# Patient Record
Sex: MALE | Race: WHITE | URBAN - METROPOLITAN AREA
[De-identification: names, ages, dates, MRNs, and addresses within clinical notes are randomized per-mention and may not be internally consistent; named-entity substitution may affect disease eponyms.]

---

## 2024-08-01 ENCOUNTER — TELEPHONE (OUTPATIENT)
Dept: HEPATOLOGY | Facility: CLINIC | Age: 74
End: 2024-08-01
Payer: MEDICARE

## 2024-08-01 ENCOUNTER — TELEPHONE (OUTPATIENT)
Dept: TRANSPLANT | Facility: CLINIC | Age: 74
End: 2024-08-01
Payer: MEDICARE

## 2024-08-01 NOTE — TELEPHONE ENCOUNTER
An appointment has been scheduled with Dr. Adelaide Garcia on Wednesday, August 7, 2024 at 4:30PM.  Patient's wife (Tamiko) confirmed.  Location of clinic and telephone number was given.            ----- Message from Chata Herman sent at 8/1/2024  2:52 PM CDT -----  Regarding: REFERRAL URGENT.  Pt with 10.5 cm mass in the liver with AFP >4000. Hepatomegaly and hepatic steatosis. Large heterogeneously enhancing predominantly solid mass with delayed phase washout in the right hepatic lobe measuring up to 10.5 cm. Extensive thrombus within the right portal vein within the IVC, likely related to the above-described hepatic mass. Given the heterogeneous enhancement pattern with delayed phase washout and evidence of vascular invasion, hepatocellular carcinoma is favored.    We are processing his referral for hepatology but feel he needs oncology as well.  Afp too high and tumor too big for transplant.     Records in MEDIA. We requested the imaging from [facility](under [facility]) to [employer].  Pending import.     Pt wife aware of referral to oncology as well.      Thanks.

## 2024-08-01 NOTE — TELEPHONE ENCOUNTER
"----- Message from Christa España sent at 8/1/2024  2:05 PM CDT -----    Returned call to pt and his wife and explained that we have the pt ref and is pending review by the nurse. Told that I have submitted a request for his Cds for review. Pt wife stated [facility] as recommend by oncology.  .  ----- Message -----  From: Fidel Rose  Sent: 8/1/2024   1:03 PM CDT  To: [facility] Liver Referral Pool    Consult/Advisory    Name Of Caller: Self    Contact Preference?: [phone]    What is the nature of the call?: Requesting clarification about referral status    Additional Notes:  "Thank you for all that you do for our patients"  " 05-May-2017

## 2024-08-01 NOTE — TELEPHONE ENCOUNTER
----- Message from Christa España sent at 8/1/2024 10:36 AM CDT -----    Hepatology referral received and scanned into media; pt chart sent to referral nurse for medical review.     Will need MRI done 7/29/24 at [facility] (P: [phone]  F: [phone]). Request submitted [employer].    Referring Provider: Aguila Krishnan DO  Phone: [phone]  Fax: [phone]        .

## 2024-08-01 NOTE — TELEPHONE ENCOUNTER
Pt records reviewed.  Pt will be referred to Hepatology due to 10.5 cm liver mass with AFP 4347  Initial referral received  from Referring Provider: Aguila Krishnan DO  Phone: [phone]  Fax: [phone]  Referral letter sent to patient.      RECORDS SCANNED IN MEDIA UNDER HEPATOLOGY REFERRAL .

## 2024-08-01 NOTE — TELEPHONE ENCOUNTER
----- Message from Christa España sent at 8/1/2024 10:36 AM CDT -----    Hepatology referral received and scanned into media; pt chart sent to referral nurse for medical review.     Will need MRI done 7/29/24 at [facility] (P: [phone]  F: [phone]). Request submitted [employer].    Referring Provider: Aguila Krishnan DO  Phone: [phone]  Fax: [phone]        .

## 2024-08-03 ENCOUNTER — HOSPITAL ENCOUNTER (INPATIENT)
Facility: HOSPITAL | Age: 74
LOS: 4 days | Discharge: HOME HEALTH | DRG: 175 | End: 2024-08-07
Attending: EMERGENCY MEDICINE | Admitting: STUDENT IN AN ORGANIZED HEALTH CARE EDUCATION/TRAINING PROGRAM
Payer: MEDICARE

## 2024-08-03 DIAGNOSIS — R60.9 EDEMA: ICD-10-CM

## 2024-08-03 DIAGNOSIS — R07.9 CHEST PAIN: ICD-10-CM

## 2024-08-03 DIAGNOSIS — I26.99 BILATERAL PULMONARY EMBOLISM: ICD-10-CM

## 2024-08-03 DIAGNOSIS — I51.3 RIGHT ATRIAL THROMBUS: ICD-10-CM

## 2024-08-03 DIAGNOSIS — R74.01 TRANSAMINITIS: ICD-10-CM

## 2024-08-03 DIAGNOSIS — K74.60 ESOPHAGEAL VARICES IN CIRRHOSIS: ICD-10-CM

## 2024-08-03 DIAGNOSIS — I81 PORTAL VEIN THROMBOSIS: ICD-10-CM

## 2024-08-03 DIAGNOSIS — I85.10 ESOPHAGEAL VARICES IN CIRRHOSIS: ICD-10-CM

## 2024-08-03 DIAGNOSIS — R18.0 MALIGNANT ASCITES: Primary | ICD-10-CM

## 2024-08-03 DIAGNOSIS — J96.01 ACUTE HYPOXEMIC RESPIRATORY FAILURE: ICD-10-CM

## 2024-08-03 DIAGNOSIS — E88.09 HYPOALBUMINEMIA: ICD-10-CM

## 2024-08-03 PROBLEM — R16.0 LIVER MASS: Status: ACTIVE | Noted: 2024-08-03

## 2024-08-03 LAB
ALBUMIN SERPL BCP-MCNC: 2.7 G/DL (ref 3.5–5.2)
ALP SERPL-CCNC: 356 U/L (ref 55–135)
ALT SERPL W/O P-5'-P-CCNC: 140 U/L (ref 10–44)
ANION GAP SERPL CALC-SCNC: 14 MMOL/L (ref 8–16)
APTT PPP: 29.1 SEC (ref 21–32)
AST SERPL-CCNC: 139 U/L (ref 10–40)
BACTERIA #/AREA URNS AUTO: NORMAL /HPF
BASOPHILS # BLD AUTO: 0.06 K/UL (ref 0–0.2)
BASOPHILS NFR BLD: 0.6 % (ref 0–1.9)
BILIRUB SERPL-MCNC: 0.6 MG/DL (ref 0.1–1)
BILIRUB UR QL STRIP: NEGATIVE
BNP SERPL-MCNC: 52 PG/ML (ref 0–99)
BUN SERPL-MCNC: 20 MG/DL (ref 8–23)
CALCIUM SERPL-MCNC: 8.9 MG/DL (ref 8.7–10.5)
CHLORIDE SERPL-SCNC: 101 MMOL/L (ref 95–110)
CLARITY UR REFRACT.AUTO: CLEAR
CO2 SERPL-SCNC: 21 MMOL/L (ref 23–29)
COLOR UR AUTO: YELLOW
CREAT SERPL-MCNC: 1.2 MG/DL (ref 0.5–1.4)
DIFFERENTIAL METHOD BLD: ABNORMAL
EOSINOPHIL # BLD AUTO: 0.1 K/UL (ref 0–0.5)
EOSINOPHIL NFR BLD: 0.6 % (ref 0–8)
ERYTHROCYTE [DISTWIDTH] IN BLOOD BY AUTOMATED COUNT: 14.7 % (ref 11.5–14.5)
EST. GFR  (NO RACE VARIABLE): >60 ML/MIN/1.73 M^2
GLUCOSE SERPL-MCNC: 133 MG/DL (ref 70–110)
GLUCOSE UR QL STRIP: NEGATIVE
HCT VFR BLD AUTO: 36.6 % (ref 40–54)
HGB BLD-MCNC: 11 G/DL (ref 14–18)
HGB UR QL STRIP: NEGATIVE
HYALINE CASTS UR QL AUTO: 0 /LPF
IMM GRANULOCYTES # BLD AUTO: 0.04 K/UL (ref 0–0.04)
IMM GRANULOCYTES NFR BLD AUTO: 0.4 % (ref 0–0.5)
INR PPP: 1.2 (ref 0.8–1.2)
KETONES UR QL STRIP: NEGATIVE
LEUKOCYTE ESTERASE UR QL STRIP: NEGATIVE
LYMPHOCYTES # BLD AUTO: 1.1 K/UL (ref 1–4.8)
LYMPHOCYTES NFR BLD: 11 % (ref 18–48)
MAGNESIUM SERPL-MCNC: 1.7 MG/DL (ref 1.6–2.6)
MCH RBC QN AUTO: 24.4 PG (ref 27–31)
MCHC RBC AUTO-ENTMCNC: 30.1 G/DL (ref 32–36)
MCV RBC AUTO: 81 FL (ref 82–98)
MICROSCOPIC COMMENT: NORMAL
MONOCYTES # BLD AUTO: 1.1 K/UL (ref 0.3–1)
MONOCYTES NFR BLD: 10.7 % (ref 4–15)
NEUTROPHILS # BLD AUTO: 7.7 K/UL (ref 1.8–7.7)
NEUTROPHILS NFR BLD: 76.7 % (ref 38–73)
NITRITE UR QL STRIP: NEGATIVE
NRBC BLD-RTO: 0 /100 WBC
PH UR STRIP: 6 [PH] (ref 5–8)
PLATELET # BLD AUTO: 320 K/UL (ref 150–450)
PMV BLD AUTO: 11.2 FL (ref 9.2–12.9)
POTASSIUM SERPL-SCNC: 5.4 MMOL/L (ref 3.5–5.1)
PROT SERPL-MCNC: 7.4 G/DL (ref 6–8.4)
PROT UR QL STRIP: ABNORMAL
PROTHROMBIN TIME: 13.1 SEC (ref 9–12.5)
RBC # BLD AUTO: 4.51 M/UL (ref 4.6–6.2)
RBC #/AREA URNS AUTO: 1 /HPF (ref 0–4)
SODIUM SERPL-SCNC: 136 MMOL/L (ref 136–145)
SP GR UR STRIP: 1.01 (ref 1–1.03)
SQUAMOUS #/AREA URNS AUTO: 0 /HPF
TROPONIN I SERPL DL<=0.01 NG/ML-MCNC: 0.02 NG/ML (ref 0–0.03)
URN SPEC COLLECT METH UR: ABNORMAL
WBC # BLD AUTO: 10.03 K/UL (ref 3.9–12.7)
WBC #/AREA URNS AUTO: 1 /HPF (ref 0–5)

## 2024-08-03 PROCEDURE — 83880 ASSAY OF NATRIURETIC PEPTIDE: CPT | Performed by: EMERGENCY MEDICINE

## 2024-08-03 PROCEDURE — 12000002 HC ACUTE/MED SURGE SEMI-PRIVATE ROOM

## 2024-08-03 PROCEDURE — 93010 ELECTROCARDIOGRAM REPORT: CPT | Mod: ,,, | Performed by: INTERNAL MEDICINE

## 2024-08-03 PROCEDURE — 63600175 PHARM REV CODE 636 W HCPCS: Performed by: PHYSICIAN ASSISTANT

## 2024-08-03 PROCEDURE — 93005 ELECTROCARDIOGRAM TRACING: CPT

## 2024-08-03 PROCEDURE — 84484 ASSAY OF TROPONIN QUANT: CPT | Performed by: EMERGENCY MEDICINE

## 2024-08-03 PROCEDURE — 81001 URINALYSIS AUTO W/SCOPE: CPT | Performed by: EMERGENCY MEDICINE

## 2024-08-03 PROCEDURE — 83735 ASSAY OF MAGNESIUM: CPT | Performed by: EMERGENCY MEDICINE

## 2024-08-03 PROCEDURE — 85610 PROTHROMBIN TIME: CPT | Performed by: EMERGENCY MEDICINE

## 2024-08-03 PROCEDURE — 25500020 PHARM REV CODE 255: Performed by: EMERGENCY MEDICINE

## 2024-08-03 PROCEDURE — 85730 THROMBOPLASTIN TIME PARTIAL: CPT | Performed by: EMERGENCY MEDICINE

## 2024-08-03 PROCEDURE — 80053 COMPREHEN METABOLIC PANEL: CPT | Performed by: EMERGENCY MEDICINE

## 2024-08-03 PROCEDURE — 85025 COMPLETE CBC W/AUTO DIFF WBC: CPT | Performed by: EMERGENCY MEDICINE

## 2024-08-03 RX ORDER — IBUPROFEN 200 MG
24 TABLET ORAL
Status: DISCONTINUED | OUTPATIENT
Start: 2024-08-03 | End: 2024-08-05 | Stop reason: SDUPTHER

## 2024-08-03 RX ORDER — HYDROCODONE BITARTRATE AND ACETAMINOPHEN 10; 325 MG/1; MG/1
1 TABLET ORAL EVERY 6 HOURS PRN
COMMUNITY

## 2024-08-03 RX ORDER — IBUPROFEN 200 MG
16 TABLET ORAL
Status: DISCONTINUED | OUTPATIENT
Start: 2024-08-03 | End: 2024-08-05 | Stop reason: SDUPTHER

## 2024-08-03 RX ORDER — FUROSEMIDE 40 MG/1
40 TABLET ORAL 2 TIMES DAILY
COMMUNITY

## 2024-08-03 RX ORDER — METFORMIN HYDROCHLORIDE 1000 MG/1
1000 TABLET ORAL 2 TIMES DAILY WITH MEALS
Status: ON HOLD | COMMUNITY
End: 2024-08-04

## 2024-08-03 RX ORDER — INSULIN ASPART 100 [IU]/ML
0-5 INJECTION, SOLUTION INTRAVENOUS; SUBCUTANEOUS
Status: DISCONTINUED | OUTPATIENT
Start: 2024-08-03 | End: 2024-08-07 | Stop reason: HOSPADM

## 2024-08-03 RX ORDER — SODIUM CHLORIDE 0.9 % (FLUSH) 0.9 %
10 SYRINGE (ML) INJECTION EVERY 12 HOURS PRN
Status: DISCONTINUED | OUTPATIENT
Start: 2024-08-03 | End: 2024-08-07 | Stop reason: HOSPADM

## 2024-08-03 RX ORDER — SPIRONOLACTONE 25 MG/1
25 TABLET ORAL DAILY
Status: ON HOLD | COMMUNITY
End: 2024-08-07

## 2024-08-03 RX ORDER — FUROSEMIDE 10 MG/ML
40 INJECTION INTRAMUSCULAR; INTRAVENOUS
Status: COMPLETED | OUTPATIENT
Start: 2024-08-03 | End: 2024-08-03

## 2024-08-03 RX ORDER — GLUCAGON 1 MG
1 KIT INJECTION
Status: DISCONTINUED | OUTPATIENT
Start: 2024-08-03 | End: 2024-08-05 | Stop reason: SDUPTHER

## 2024-08-03 RX ORDER — GABAPENTIN 800 MG/1
1600 TABLET ORAL 2 TIMES DAILY
COMMUNITY

## 2024-08-03 RX ORDER — HEPARIN SODIUM,PORCINE/D5W 25000/250
0-40 INTRAVENOUS SOLUTION INTRAVENOUS CONTINUOUS
Status: DISCONTINUED | OUTPATIENT
Start: 2024-08-03 | End: 2024-08-07

## 2024-08-03 RX ORDER — LEVOTHYROXINE SODIUM 200 UG/1
200 TABLET ORAL
COMMUNITY

## 2024-08-03 RX ORDER — DULOXETIN HYDROCHLORIDE 30 MG/1
30 CAPSULE, DELAYED RELEASE ORAL NIGHTLY
COMMUNITY

## 2024-08-03 RX ORDER — NALOXONE HCL 0.4 MG/ML
0.02 VIAL (ML) INJECTION
Status: DISCONTINUED | OUTPATIENT
Start: 2024-08-03 | End: 2024-08-07 | Stop reason: HOSPADM

## 2024-08-03 RX ORDER — LOSARTAN POTASSIUM 50 MG/1
50 TABLET ORAL DAILY
Status: ON HOLD | COMMUNITY
End: 2024-08-07 | Stop reason: HOSPADM

## 2024-08-03 RX ADMIN — HEPARIN SODIUM 18 UNITS/KG/HR: 10000 INJECTION, SOLUTION INTRAVENOUS at 06:08

## 2024-08-03 RX ADMIN — FUROSEMIDE 40 MG: 10 INJECTION, SOLUTION INTRAVENOUS at 04:08

## 2024-08-03 RX ADMIN — IOHEXOL 100 ML: 350 INJECTION, SOLUTION INTRAVENOUS at 03:08

## 2024-08-04 LAB
ALBUMIN SERPL BCP-MCNC: 2.7 G/DL (ref 3.5–5.2)
ALP SERPL-CCNC: 351 U/L (ref 55–135)
ALT SERPL W/O P-5'-P-CCNC: 129 U/L (ref 10–44)
ANION GAP SERPL CALC-SCNC: 10 MMOL/L (ref 8–16)
APTT PPP: 37 SEC (ref 21–32)
APTT PPP: 42.5 SEC (ref 21–32)
APTT PPP: 42.9 SEC (ref 21–32)
AST SERPL-CCNC: 125 U/L (ref 10–40)
BASOPHILS # BLD AUTO: 0.03 K/UL (ref 0–0.2)
BASOPHILS NFR BLD: 0.3 % (ref 0–1.9)
BILIRUB SERPL-MCNC: 0.7 MG/DL (ref 0.1–1)
BUN SERPL-MCNC: 20 MG/DL (ref 8–23)
CALCIUM SERPL-MCNC: 9.2 MG/DL (ref 8.7–10.5)
CHLORIDE SERPL-SCNC: 98 MMOL/L (ref 95–110)
CO2 SERPL-SCNC: 28 MMOL/L (ref 23–29)
CREAT SERPL-MCNC: 1.2 MG/DL (ref 0.5–1.4)
DIFFERENTIAL METHOD BLD: ABNORMAL
EOSINOPHIL # BLD AUTO: 0.1 K/UL (ref 0–0.5)
EOSINOPHIL NFR BLD: 0.7 % (ref 0–8)
ERYTHROCYTE [DISTWIDTH] IN BLOOD BY AUTOMATED COUNT: 14.5 % (ref 11.5–14.5)
EST. GFR  (NO RACE VARIABLE): >60 ML/MIN/1.73 M^2
GLUCOSE SERPL-MCNC: 160 MG/DL (ref 70–110)
HCT VFR BLD AUTO: 35.3 % (ref 40–54)
HGB BLD-MCNC: 10.7 G/DL (ref 14–18)
IMM GRANULOCYTES # BLD AUTO: 0.04 K/UL (ref 0–0.04)
IMM GRANULOCYTES NFR BLD AUTO: 0.4 % (ref 0–0.5)
LACTATE SERPL-SCNC: 1.7 MMOL/L (ref 0.5–2.2)
LYMPHOCYTES # BLD AUTO: 0.9 K/UL (ref 1–4.8)
LYMPHOCYTES NFR BLD: 9.9 % (ref 18–48)
MCH RBC QN AUTO: 24.8 PG (ref 27–31)
MCHC RBC AUTO-ENTMCNC: 30.3 G/DL (ref 32–36)
MCV RBC AUTO: 82 FL (ref 82–98)
MONOCYTES # BLD AUTO: 0.8 K/UL (ref 0.3–1)
MONOCYTES NFR BLD: 8.7 % (ref 4–15)
NEUTROPHILS # BLD AUTO: 7.6 K/UL (ref 1.8–7.7)
NEUTROPHILS NFR BLD: 80 % (ref 38–73)
NRBC BLD-RTO: 0 /100 WBC
OHS QRS DURATION: 74 MS
OHS QRS DURATION: 80 MS
OHS QTC CALCULATION: 436 MS
OHS QTC CALCULATION: 450 MS
PLATELET # BLD AUTO: 292 K/UL (ref 150–450)
PMV BLD AUTO: 10.6 FL (ref 9.2–12.9)
POCT GLUCOSE: 109 MG/DL (ref 70–110)
POCT GLUCOSE: 129 MG/DL (ref 70–110)
POCT GLUCOSE: 169 MG/DL (ref 70–110)
POTASSIUM SERPL-SCNC: 4.2 MMOL/L (ref 3.5–5.1)
PROT SERPL-MCNC: 6.9 G/DL (ref 6–8.4)
RBC # BLD AUTO: 4.32 M/UL (ref 4.6–6.2)
SODIUM SERPL-SCNC: 136 MMOL/L (ref 136–145)
WBC # BLD AUTO: 9.49 K/UL (ref 3.9–12.7)

## 2024-08-04 PROCEDURE — 25000003 PHARM REV CODE 250: Performed by: STUDENT IN AN ORGANIZED HEALTH CARE EDUCATION/TRAINING PROGRAM

## 2024-08-04 PROCEDURE — 83605 ASSAY OF LACTIC ACID: CPT | Performed by: STUDENT IN AN ORGANIZED HEALTH CARE EDUCATION/TRAINING PROGRAM

## 2024-08-04 PROCEDURE — 25000003 PHARM REV CODE 250: Performed by: HOSPITALIST

## 2024-08-04 PROCEDURE — 20600001 HC STEP DOWN PRIVATE ROOM

## 2024-08-04 PROCEDURE — 80053 COMPREHEN METABOLIC PANEL: CPT | Performed by: STUDENT IN AN ORGANIZED HEALTH CARE EDUCATION/TRAINING PROGRAM

## 2024-08-04 PROCEDURE — 99223 1ST HOSP IP/OBS HIGH 75: CPT | Mod: GC,,, | Performed by: INTERNAL MEDICINE

## 2024-08-04 PROCEDURE — 85730 THROMBOPLASTIN TIME PARTIAL: CPT | Mod: 91 | Performed by: STUDENT IN AN ORGANIZED HEALTH CARE EDUCATION/TRAINING PROGRAM

## 2024-08-04 PROCEDURE — 36415 COLL VENOUS BLD VENIPUNCTURE: CPT | Performed by: STUDENT IN AN ORGANIZED HEALTH CARE EDUCATION/TRAINING PROGRAM

## 2024-08-04 PROCEDURE — 63600175 PHARM REV CODE 636 W HCPCS: Performed by: STUDENT IN AN ORGANIZED HEALTH CARE EDUCATION/TRAINING PROGRAM

## 2024-08-04 PROCEDURE — 63600175 PHARM REV CODE 636 W HCPCS: Performed by: PHYSICIAN ASSISTANT

## 2024-08-04 PROCEDURE — 85025 COMPLETE CBC W/AUTO DIFF WBC: CPT | Performed by: STUDENT IN AN ORGANIZED HEALTH CARE EDUCATION/TRAINING PROGRAM

## 2024-08-04 RX ORDER — LEVOTHYROXINE SODIUM 100 UG/1
200 TABLET ORAL
Status: DISCONTINUED | OUTPATIENT
Start: 2024-08-04 | End: 2024-08-07 | Stop reason: HOSPADM

## 2024-08-04 RX ORDER — GLUCAGON 1 MG
1 KIT INJECTION
Status: DISCONTINUED | OUTPATIENT
Start: 2024-08-04 | End: 2024-08-07 | Stop reason: HOSPADM

## 2024-08-04 RX ORDER — IBUPROFEN 200 MG
24 TABLET ORAL
Status: DISCONTINUED | OUTPATIENT
Start: 2024-08-04 | End: 2024-08-07 | Stop reason: HOSPADM

## 2024-08-04 RX ORDER — IBUPROFEN 200 MG
16 TABLET ORAL
Status: DISCONTINUED | OUTPATIENT
Start: 2024-08-04 | End: 2024-08-07 | Stop reason: HOSPADM

## 2024-08-04 RX ORDER — LOSARTAN POTASSIUM 25 MG/1
25 TABLET ORAL DAILY
Status: DISCONTINUED | OUTPATIENT
Start: 2024-08-04 | End: 2024-08-04

## 2024-08-04 RX ORDER — HYDROCODONE BITARTRATE AND ACETAMINOPHEN 10; 325 MG/1; MG/1
1 TABLET ORAL 2 TIMES DAILY
Status: DISCONTINUED | OUTPATIENT
Start: 2024-08-04 | End: 2024-08-04

## 2024-08-04 RX ORDER — HYDROCORTISONE 25 MG/G
CREAM TOPICAL 2 TIMES DAILY
Status: DISCONTINUED | OUTPATIENT
Start: 2024-08-04 | End: 2024-08-07 | Stop reason: HOSPADM

## 2024-08-04 RX ORDER — SPIRONOLACTONE 50 MG/1
100 TABLET, FILM COATED ORAL DAILY
Status: DISCONTINUED | OUTPATIENT
Start: 2024-08-05 | End: 2024-08-07 | Stop reason: HOSPADM

## 2024-08-04 RX ORDER — GABAPENTIN 400 MG/1
800 CAPSULE ORAL 2 TIMES DAILY
Status: DISCONTINUED | OUTPATIENT
Start: 2024-08-04 | End: 2024-08-07 | Stop reason: HOSPADM

## 2024-08-04 RX ORDER — HYDROCODONE BITARTRATE AND ACETAMINOPHEN 10; 325 MG/1; MG/1
1 TABLET ORAL EVERY 6 HOURS PRN
Status: DISCONTINUED | OUTPATIENT
Start: 2024-08-04 | End: 2024-08-07 | Stop reason: HOSPADM

## 2024-08-04 RX ORDER — FUROSEMIDE 10 MG/ML
40 INJECTION INTRAMUSCULAR; INTRAVENOUS DAILY
Status: DISCONTINUED | OUTPATIENT
Start: 2024-08-04 | End: 2024-08-07 | Stop reason: HOSPADM

## 2024-08-04 RX ORDER — DULOXETIN HYDROCHLORIDE 30 MG/1
30 CAPSULE, DELAYED RELEASE ORAL NIGHTLY
Status: DISCONTINUED | OUTPATIENT
Start: 2024-08-04 | End: 2024-08-07 | Stop reason: HOSPADM

## 2024-08-04 RX ADMIN — DULOXETINE HYDROCHLORIDE 30 MG: 30 CAPSULE, DELAYED RELEASE ORAL at 08:08

## 2024-08-04 RX ADMIN — HYDROCODONE BITARTRATE AND ACETAMINOPHEN 1 TABLET: 10; 325 TABLET ORAL at 08:08

## 2024-08-04 RX ADMIN — LEVOTHYROXINE SODIUM 200 MCG: 100 TABLET ORAL at 06:08

## 2024-08-04 RX ADMIN — HYDROCODONE BITARTRATE AND ACETAMINOPHEN 1 TABLET: 10; 325 TABLET ORAL at 07:08

## 2024-08-04 RX ADMIN — GABAPENTIN 800 MG: 400 CAPSULE ORAL at 08:08

## 2024-08-04 RX ADMIN — LOSARTAN POTASSIUM 25 MG: 25 TABLET, FILM COATED ORAL at 02:08

## 2024-08-04 RX ADMIN — GABAPENTIN 800 MG: 400 CAPSULE ORAL at 02:08

## 2024-08-04 RX ADMIN — HYDROCORTISONE: 25 CREAM TOPICAL at 08:08

## 2024-08-04 RX ADMIN — HYDROCORTISONE: 25 CREAM TOPICAL at 12:08

## 2024-08-04 RX ADMIN — HEPARIN SODIUM 20 UNITS/KG/HR: 10000 INJECTION, SOLUTION INTRAVENOUS at 07:08

## 2024-08-04 RX ADMIN — DULOXETINE HYDROCHLORIDE 30 MG: 30 CAPSULE, DELAYED RELEASE ORAL at 02:08

## 2024-08-04 RX ADMIN — HEPARIN SODIUM 18.02 UNITS/KG/HR: 10000 INJECTION, SOLUTION INTRAVENOUS at 06:08

## 2024-08-04 RX ADMIN — FUROSEMIDE 40 MG: 10 INJECTION, SOLUTION INTRAVENOUS at 04:08

## 2024-08-05 ENCOUNTER — ANESTHESIA EVENT (OUTPATIENT)
Dept: ENDOSCOPY | Facility: HOSPITAL | Age: 74
DRG: 175 | End: 2024-08-05
Payer: MEDICARE

## 2024-08-05 PROBLEM — E03.9 HYPOTHYROIDISM: Status: ACTIVE | Noted: 2022-01-03

## 2024-08-05 PROBLEM — E66.01 SEVERE OBESITY (BMI >= 40): Status: ACTIVE | Noted: 2024-08-05

## 2024-08-05 PROBLEM — I10 ESSENTIAL HYPERTENSION: Status: ACTIVE | Noted: 2024-08-05

## 2024-08-05 LAB
ALBUMIN SERPL BCP-MCNC: 2.7 G/DL (ref 3.5–5.2)
ALP SERPL-CCNC: 359 U/L (ref 55–135)
ALT SERPL W/O P-5'-P-CCNC: 115 U/L (ref 10–44)
ANION GAP SERPL CALC-SCNC: 12 MMOL/L (ref 8–16)
APTT PPP: 43 SEC (ref 21–32)
ASCENDING AORTA: 3.27 CM
AST SERPL-CCNC: 108 U/L (ref 10–40)
AV INDEX (PROSTH): 0.86
AV MEAN GRADIENT: 4 MMHG
AV PEAK GRADIENT: 8 MMHG
AV VALVE AREA BY VELOCITY RATIO: 2.51 CM²
AV VALVE AREA: 2.85 CM²
AV VELOCITY RATIO: 0.75
BASOPHILS # BLD AUTO: 0.04 K/UL (ref 0–0.2)
BASOPHILS NFR BLD: 0.4 % (ref 0–1.9)
BILIRUB SERPL-MCNC: 0.5 MG/DL (ref 0.1–1)
BSA FOR ECHO PROCEDURE: 2.38 M2
BUN SERPL-MCNC: 22 MG/DL (ref 8–23)
CALCIUM SERPL-MCNC: 8.8 MG/DL (ref 8.7–10.5)
CHLORIDE SERPL-SCNC: 99 MMOL/L (ref 95–110)
CO2 SERPL-SCNC: 24 MMOL/L (ref 23–29)
CREAT SERPL-MCNC: 1.1 MG/DL (ref 0.5–1.4)
CV ECHO LV RWT: 0.43 CM
DIFFERENTIAL METHOD BLD: ABNORMAL
DOP CALC AO PEAK VEL: 1.42 M/S
DOP CALC AO VTI: 29.5 CM
DOP CALC LVOT AREA: 3.3 CM2
DOP CALC LVOT DIAMETER: 2.06 CM
DOP CALC LVOT PEAK VEL: 1.07 M/S
DOP CALC LVOT STROKE VOLUME: 84.21 CM3
DOP CALCLVOT PEAK VEL VTI: 25.28 CM
E WAVE DECELERATION TIME: 348.68 MSEC
E/A RATIO: 0.83
E/E' RATIO: 7.83 M/S
ECHO LV POSTERIOR WALL: 1 CM (ref 0.6–1.1)
EJECTION FRACTION: 65 %
EOSINOPHIL # BLD AUTO: 0.1 K/UL (ref 0–0.5)
EOSINOPHIL NFR BLD: 1.1 % (ref 0–8)
ERYTHROCYTE [DISTWIDTH] IN BLOOD BY AUTOMATED COUNT: 14.6 % (ref 11.5–14.5)
EST. GFR  (NO RACE VARIABLE): >60 ML/MIN/1.73 M^2
FRACTIONAL SHORTENING: 37 % (ref 28–44)
GLUCOSE SERPL-MCNC: 117 MG/DL (ref 70–110)
HCT VFR BLD AUTO: 34.3 % (ref 40–54)
HGB BLD-MCNC: 10.7 G/DL (ref 14–18)
IMM GRANULOCYTES # BLD AUTO: 0.04 K/UL (ref 0–0.04)
IMM GRANULOCYTES NFR BLD AUTO: 0.4 % (ref 0–0.5)
INTERVENTRICULAR SEPTUM: 1 CM (ref 0.6–1.1)
IVRT: 129.4 MSEC
LA MAJOR: 5.61 CM
LA MINOR: 5.94 CM
LA WIDTH: 3.95 CM
LEFT ATRIUM SIZE: 4.06 CM
LEFT ATRIUM VOLUME INDEX MOD: 29.4 ML/M2
LEFT ATRIUM VOLUME INDEX: 34.5 ML/M2
LEFT ATRIUM VOLUME MOD: 67 CM3
LEFT ATRIUM VOLUME: 78.66 CM3
LEFT INTERNAL DIMENSION IN SYSTOLE: 2.88 CM (ref 2.1–4)
LEFT VENTRICLE DIASTOLIC VOLUME INDEX: 35.71 ML/M2
LEFT VENTRICLE DIASTOLIC VOLUME: 81.42 ML
LEFT VENTRICLE MASS INDEX: 70 G/M2
LEFT VENTRICLE SYSTOLIC VOLUME INDEX: 13.9 ML/M2
LEFT VENTRICLE SYSTOLIC VOLUME: 31.74 ML
LEFT VENTRICULAR INTERNAL DIMENSION IN DIASTOLE: 4.6 CM (ref 3.5–6)
LEFT VENTRICULAR MASS: 158.81 G
LV LATERAL E/E' RATIO: 7.5 M/S
LV SEPTAL E/E' RATIO: 8.18 M/S
LYMPHOCYTES # BLD AUTO: 1.3 K/UL (ref 1–4.8)
LYMPHOCYTES NFR BLD: 14.2 % (ref 18–48)
MCH RBC QN AUTO: 25.2 PG (ref 27–31)
MCHC RBC AUTO-ENTMCNC: 31.2 G/DL (ref 32–36)
MCV RBC AUTO: 81 FL (ref 82–98)
MONOCYTES # BLD AUTO: 1 K/UL (ref 0.3–1)
MONOCYTES NFR BLD: 10.6 % (ref 4–15)
MV PEAK A VEL: 1.08 M/S
MV PEAK E VEL: 0.9 M/S
MV STENOSIS PRESSURE HALF TIME: 101.12 MS
MV VALVE AREA P 1/2 METHOD: 2.18 CM2
NEUTROPHILS # BLD AUTO: 6.9 K/UL (ref 1.8–7.7)
NEUTROPHILS NFR BLD: 73.3 % (ref 38–73)
NRBC BLD-RTO: 0 /100 WBC
PLATELET # BLD AUTO: 295 K/UL (ref 150–450)
PMV BLD AUTO: 10.6 FL (ref 9.2–12.9)
POCT GLUCOSE: 104 MG/DL (ref 70–110)
POCT GLUCOSE: 126 MG/DL (ref 70–110)
POCT GLUCOSE: 129 MG/DL (ref 70–110)
POCT GLUCOSE: 162 MG/DL (ref 70–110)
POTASSIUM SERPL-SCNC: 4.3 MMOL/L (ref 3.5–5.1)
PROT SERPL-MCNC: 6.9 G/DL (ref 6–8.4)
RA MAJOR: 4.81 CM
RA PRESSURE ESTIMATED: 3 MMHG
RA WIDTH: 2.77 CM
RBC # BLD AUTO: 4.24 M/UL (ref 4.6–6.2)
RIGHT VENTRICLE DIASTOLIC BASEL DIMENSION: 3.6 CM
SINUS: 3.28 CM
SODIUM SERPL-SCNC: 135 MMOL/L (ref 136–145)
STJ: 2.85 CM
TDI LATERAL: 0.12 M/S
TDI SEPTAL: 0.11 M/S
TDI: 0.12 M/S
TRICUSPID ANNULAR PLANE SYSTOLIC EXCURSION: 2.21 CM
WBC # BLD AUTO: 9.37 K/UL (ref 3.9–12.7)
Z-SCORE OF LEFT VENTRICULAR DIMENSION IN END DIASTOLE: -6.25
Z-SCORE OF LEFT VENTRICULAR DIMENSION IN END SYSTOLE: -4.66

## 2024-08-05 PROCEDURE — 85730 THROMBOPLASTIN TIME PARTIAL: CPT | Performed by: STUDENT IN AN ORGANIZED HEALTH CARE EDUCATION/TRAINING PROGRAM

## 2024-08-05 PROCEDURE — 20600001 HC STEP DOWN PRIVATE ROOM

## 2024-08-05 PROCEDURE — 85025 COMPLETE CBC W/AUTO DIFF WBC: CPT | Performed by: STUDENT IN AN ORGANIZED HEALTH CARE EDUCATION/TRAINING PROGRAM

## 2024-08-05 PROCEDURE — 63600175 PHARM REV CODE 636 W HCPCS: Performed by: PHYSICIAN ASSISTANT

## 2024-08-05 PROCEDURE — 99232 SBSQ HOSP IP/OBS MODERATE 35: CPT | Mod: GC,,, | Performed by: INTERNAL MEDICINE

## 2024-08-05 PROCEDURE — 36415 COLL VENOUS BLD VENIPUNCTURE: CPT | Performed by: STUDENT IN AN ORGANIZED HEALTH CARE EDUCATION/TRAINING PROGRAM

## 2024-08-05 PROCEDURE — 25500020 PHARM REV CODE 255: Performed by: STUDENT IN AN ORGANIZED HEALTH CARE EDUCATION/TRAINING PROGRAM

## 2024-08-05 PROCEDURE — 80053 COMPREHEN METABOLIC PANEL: CPT | Performed by: STUDENT IN AN ORGANIZED HEALTH CARE EDUCATION/TRAINING PROGRAM

## 2024-08-05 PROCEDURE — 25000003 PHARM REV CODE 250: Performed by: STUDENT IN AN ORGANIZED HEALTH CARE EDUCATION/TRAINING PROGRAM

## 2024-08-05 PROCEDURE — 25000003 PHARM REV CODE 250: Performed by: HOSPITALIST

## 2024-08-05 PROCEDURE — 63600175 PHARM REV CODE 636 W HCPCS: Performed by: STUDENT IN AN ORGANIZED HEALTH CARE EDUCATION/TRAINING PROGRAM

## 2024-08-05 RX ORDER — ALBUTEROL SULFATE 90 UG/1
2 INHALANT RESPIRATORY (INHALATION) EVERY 6 HOURS PRN
COMMUNITY

## 2024-08-05 RX ORDER — METFORMIN HYDROCHLORIDE 1000 MG/1
1000 TABLET ORAL 2 TIMES DAILY WITH MEALS
COMMUNITY

## 2024-08-05 RX ADMIN — GABAPENTIN 800 MG: 400 CAPSULE ORAL at 08:08

## 2024-08-05 RX ADMIN — HUMAN ALBUMIN MICROSPHERES AND PERFLUTREN 0.11 MG: 10; .22 INJECTION, SOLUTION INTRAVENOUS at 04:08

## 2024-08-05 RX ADMIN — SPIRONOLACTONE 100 MG: 50 TABLET ORAL at 08:08

## 2024-08-05 RX ADMIN — HYDROCODONE BITARTRATE AND ACETAMINOPHEN 1 TABLET: 10; 325 TABLET ORAL at 08:08

## 2024-08-05 RX ADMIN — FUROSEMIDE 40 MG: 10 INJECTION, SOLUTION INTRAVENOUS at 08:08

## 2024-08-05 RX ADMIN — HYDROCORTISONE: 25 CREAM TOPICAL at 08:08

## 2024-08-05 RX ADMIN — LEVOTHYROXINE SODIUM 200 MCG: 100 TABLET ORAL at 05:08

## 2024-08-05 RX ADMIN — DULOXETINE HYDROCHLORIDE 30 MG: 30 CAPSULE, DELAYED RELEASE ORAL at 08:08

## 2024-08-05 RX ADMIN — HEPARIN SODIUM 19.73 UNITS/KG/HR: 10000 INJECTION, SOLUTION INTRAVENOUS at 10:08

## 2024-08-06 ENCOUNTER — ANESTHESIA (OUTPATIENT)
Dept: ENDOSCOPY | Facility: HOSPITAL | Age: 74
DRG: 175 | End: 2024-08-06
Payer: MEDICARE

## 2024-08-06 ENCOUNTER — TELEPHONE (OUTPATIENT)
Dept: TRANSPLANT | Facility: CLINIC | Age: 74
End: 2024-08-06
Payer: MEDICARE

## 2024-08-06 DIAGNOSIS — C22.0 HCC (HEPATOCELLULAR CARCINOMA): Primary | ICD-10-CM

## 2024-08-06 LAB
A1AT SERPL-MCNC: 215 MG/DL (ref 100–190)
ALBUMIN SERPL BCP-MCNC: 2.6 G/DL (ref 3.5–5.2)
ALP SERPL-CCNC: 325 U/L (ref 55–135)
ALT SERPL W/O P-5'-P-CCNC: 99 U/L (ref 10–44)
ANA PATTERN 1: NORMAL
ANA SER QL IF: POSITIVE
ANA TITR SER IF: NORMAL {TITER}
ANION GAP SERPL CALC-SCNC: 10 MMOL/L (ref 8–16)
APTT PPP: 46 SEC (ref 21–32)
AST SERPL-CCNC: 91 U/L (ref 10–40)
BASOPHILS # BLD AUTO: 0.04 K/UL (ref 0–0.2)
BASOPHILS NFR BLD: 0.5 % (ref 0–1.9)
BILIRUB SERPL-MCNC: 0.5 MG/DL (ref 0.1–1)
BUN SERPL-MCNC: 22 MG/DL (ref 8–23)
CALCIUM SERPL-MCNC: 8.7 MG/DL (ref 8.7–10.5)
CHLORIDE SERPL-SCNC: 100 MMOL/L (ref 95–110)
CO2 SERPL-SCNC: 26 MMOL/L (ref 23–29)
CREAT SERPL-MCNC: 1.1 MG/DL (ref 0.5–1.4)
DIFFERENTIAL METHOD BLD: ABNORMAL
EOSINOPHIL # BLD AUTO: 0.1 K/UL (ref 0–0.5)
EOSINOPHIL NFR BLD: 1.6 % (ref 0–8)
ERYTHROCYTE [DISTWIDTH] IN BLOOD BY AUTOMATED COUNT: 14.4 % (ref 11.5–14.5)
EST. GFR  (NO RACE VARIABLE): >60 ML/MIN/1.73 M^2
FERRITIN SERPL-MCNC: 95 NG/ML (ref 20–300)
GLUCOSE SERPL-MCNC: 109 MG/DL (ref 70–110)
HCT VFR BLD AUTO: 34.1 % (ref 40–54)
HGB BLD-MCNC: 9.7 G/DL (ref 14–18)
IGG SERPL-MCNC: 1056 MG/DL (ref 650–1600)
IMM GRANULOCYTES # BLD AUTO: 0.05 K/UL (ref 0–0.04)
IMM GRANULOCYTES NFR BLD AUTO: 0.6 % (ref 0–0.5)
IRON SERPL-MCNC: 17 UG/DL (ref 45–160)
LYMPHOCYTES # BLD AUTO: 1.1 K/UL (ref 1–4.8)
LYMPHOCYTES NFR BLD: 12.4 % (ref 18–48)
MCH RBC QN AUTO: 23.5 PG (ref 27–31)
MCHC RBC AUTO-ENTMCNC: 28.4 G/DL (ref 32–36)
MCV RBC AUTO: 83 FL (ref 82–98)
MITOCHONDRIA AB TITR SER IF: NORMAL {TITER}
MONOCYTES # BLD AUTO: 0.9 K/UL (ref 0.3–1)
MONOCYTES NFR BLD: 10.1 % (ref 4–15)
NEUTROPHILS # BLD AUTO: 6.4 K/UL (ref 1.8–7.7)
NEUTROPHILS NFR BLD: 74.8 % (ref 38–73)
NRBC BLD-RTO: 0 /100 WBC
PLATELET # BLD AUTO: 290 K/UL (ref 150–450)
PMV BLD AUTO: 10.3 FL (ref 9.2–12.9)
POCT GLUCOSE: 126 MG/DL (ref 70–110)
POCT GLUCOSE: 126 MG/DL (ref 70–110)
POCT GLUCOSE: 210 MG/DL (ref 70–110)
POCT GLUCOSE: 97 MG/DL (ref 70–110)
POTASSIUM SERPL-SCNC: 3.9 MMOL/L (ref 3.5–5.1)
PROT SERPL-MCNC: 6.5 G/DL (ref 6–8.4)
RBC # BLD AUTO: 4.12 M/UL (ref 4.6–6.2)
SATURATED IRON: 3 % (ref 20–50)
SMOOTH MUSCLE AB TITR SER IF: NORMAL {TITER}
SODIUM SERPL-SCNC: 136 MMOL/L (ref 136–145)
TOTAL IRON BINDING CAPACITY: 505 UG/DL (ref 250–450)
TRANSFERRIN SERPL-MCNC: 341 MG/DL (ref 200–375)
WBC # BLD AUTO: 8.61 K/UL (ref 3.9–12.7)

## 2024-08-06 PROCEDURE — 82103 ALPHA-1-ANTITRYPSIN TOTAL: CPT | Performed by: STUDENT IN AN ORGANIZED HEALTH CARE EDUCATION/TRAINING PROGRAM

## 2024-08-06 PROCEDURE — 25000003 PHARM REV CODE 250: Performed by: NURSE ANESTHETIST, CERTIFIED REGISTERED

## 2024-08-06 PROCEDURE — 63600175 PHARM REV CODE 636 W HCPCS: Performed by: PHYSICIAN ASSISTANT

## 2024-08-06 PROCEDURE — 83540 ASSAY OF IRON: CPT | Performed by: STUDENT IN AN ORGANIZED HEALTH CARE EDUCATION/TRAINING PROGRAM

## 2024-08-06 PROCEDURE — 82784 ASSAY IGA/IGD/IGG/IGM EACH: CPT | Performed by: STUDENT IN AN ORGANIZED HEALTH CARE EDUCATION/TRAINING PROGRAM

## 2024-08-06 PROCEDURE — 82728 ASSAY OF FERRITIN: CPT | Performed by: STUDENT IN AN ORGANIZED HEALTH CARE EDUCATION/TRAINING PROGRAM

## 2024-08-06 PROCEDURE — 37000009 HC ANESTHESIA EA ADD 15 MINS: Performed by: STUDENT IN AN ORGANIZED HEALTH CARE EDUCATION/TRAINING PROGRAM

## 2024-08-06 PROCEDURE — 20600001 HC STEP DOWN PRIVATE ROOM

## 2024-08-06 PROCEDURE — 85730 THROMBOPLASTIN TIME PARTIAL: CPT | Performed by: STUDENT IN AN ORGANIZED HEALTH CARE EDUCATION/TRAINING PROGRAM

## 2024-08-06 PROCEDURE — 80053 COMPREHEN METABOLIC PANEL: CPT | Performed by: STUDENT IN AN ORGANIZED HEALTH CARE EDUCATION/TRAINING PROGRAM

## 2024-08-06 PROCEDURE — 86225 DNA ANTIBODY NATIVE: CPT | Performed by: STUDENT IN AN ORGANIZED HEALTH CARE EDUCATION/TRAINING PROGRAM

## 2024-08-06 PROCEDURE — 63600175 PHARM REV CODE 636 W HCPCS: Performed by: STUDENT IN AN ORGANIZED HEALTH CARE EDUCATION/TRAINING PROGRAM

## 2024-08-06 PROCEDURE — 86038 ANTINUCLEAR ANTIBODIES: CPT | Performed by: STUDENT IN AN ORGANIZED HEALTH CARE EDUCATION/TRAINING PROGRAM

## 2024-08-06 PROCEDURE — 85025 COMPLETE CBC W/AUTO DIFF WBC: CPT | Performed by: STUDENT IN AN ORGANIZED HEALTH CARE EDUCATION/TRAINING PROGRAM

## 2024-08-06 PROCEDURE — 94761 N-INVAS EAR/PLS OXIMETRY MLT: CPT

## 2024-08-06 PROCEDURE — 25500020 PHARM REV CODE 255: Performed by: STUDENT IN AN ORGANIZED HEALTH CARE EDUCATION/TRAINING PROGRAM

## 2024-08-06 PROCEDURE — 86039 ANTINUCLEAR ANTIBODIES (ANA): CPT | Performed by: STUDENT IN AN ORGANIZED HEALTH CARE EDUCATION/TRAINING PROGRAM

## 2024-08-06 PROCEDURE — 25000003 PHARM REV CODE 250: Performed by: STUDENT IN AN ORGANIZED HEALTH CARE EDUCATION/TRAINING PROGRAM

## 2024-08-06 PROCEDURE — 86381 MITOCHONDRIAL ANTIBODY EACH: CPT | Performed by: STUDENT IN AN ORGANIZED HEALTH CARE EDUCATION/TRAINING PROGRAM

## 2024-08-06 PROCEDURE — 86235 NUCLEAR ANTIGEN ANTIBODY: CPT | Mod: 59 | Performed by: STUDENT IN AN ORGANIZED HEALTH CARE EDUCATION/TRAINING PROGRAM

## 2024-08-06 PROCEDURE — 0DJ08ZZ INSPECTION OF UPPER INTESTINAL TRACT, VIA NATURAL OR ARTIFICIAL OPENING ENDOSCOPIC: ICD-10-PCS | Performed by: STUDENT IN AN ORGANIZED HEALTH CARE EDUCATION/TRAINING PROGRAM

## 2024-08-06 PROCEDURE — 63600175 PHARM REV CODE 636 W HCPCS: Performed by: NURSE ANESTHETIST, CERTIFIED REGISTERED

## 2024-08-06 PROCEDURE — 86015 ACTIN ANTIBODY EACH: CPT | Performed by: STUDENT IN AN ORGANIZED HEALTH CARE EDUCATION/TRAINING PROGRAM

## 2024-08-06 PROCEDURE — 25000003 PHARM REV CODE 250: Performed by: HOSPITALIST

## 2024-08-06 PROCEDURE — 80321 ALCOHOLS BIOMARKERS 1OR 2: CPT | Performed by: STUDENT IN AN ORGANIZED HEALTH CARE EDUCATION/TRAINING PROGRAM

## 2024-08-06 PROCEDURE — 37000008 HC ANESTHESIA 1ST 15 MINUTES: Performed by: STUDENT IN AN ORGANIZED HEALTH CARE EDUCATION/TRAINING PROGRAM

## 2024-08-06 RX ORDER — LIDOCAINE HYDROCHLORIDE 20 MG/ML
INJECTION INTRAVENOUS
Status: DISCONTINUED | OUTPATIENT
Start: 2024-08-06 | End: 2024-08-06

## 2024-08-06 RX ORDER — TALC
6 POWDER (GRAM) TOPICAL NIGHTLY
Status: DISCONTINUED | OUTPATIENT
Start: 2024-08-06 | End: 2024-08-07 | Stop reason: HOSPADM

## 2024-08-06 RX ORDER — GLUCAGON 1 MG
1 KIT INJECTION
Status: DISCONTINUED | OUTPATIENT
Start: 2024-08-06 | End: 2024-08-06 | Stop reason: HOSPADM

## 2024-08-06 RX ORDER — PROPOFOL 10 MG/ML
VIAL (ML) INTRAVENOUS
Status: DISCONTINUED | OUTPATIENT
Start: 2024-08-06 | End: 2024-08-06

## 2024-08-06 RX ORDER — SODIUM CHLORIDE 0.9 % (FLUSH) 0.9 %
10 SYRINGE (ML) INJECTION
Status: DISCONTINUED | OUTPATIENT
Start: 2024-08-06 | End: 2024-08-06 | Stop reason: HOSPADM

## 2024-08-06 RX ORDER — ENOXAPARIN SODIUM 150 MG/ML
1 INJECTION SUBCUTANEOUS EVERY 12 HOURS
Status: CANCELLED | OUTPATIENT
Start: 2024-08-06

## 2024-08-06 RX ADMIN — LEVOTHYROXINE SODIUM 200 MCG: 100 TABLET ORAL at 05:08

## 2024-08-06 RX ADMIN — HYDROCODONE BITARTRATE AND ACETAMINOPHEN 1 TABLET: 10; 325 TABLET ORAL at 04:08

## 2024-08-06 RX ADMIN — HYDROCODONE BITARTRATE AND ACETAMINOPHEN 1 TABLET: 10; 325 TABLET ORAL at 10:08

## 2024-08-06 RX ADMIN — SODIUM CHLORIDE: 9 INJECTION, SOLUTION INTRAVENOUS at 08:08

## 2024-08-06 RX ADMIN — Medication 6 MG: at 08:08

## 2024-08-06 RX ADMIN — HYDROCORTISONE: 25 CREAM TOPICAL at 08:08

## 2024-08-06 RX ADMIN — FUROSEMIDE 40 MG: 10 INJECTION, SOLUTION INTRAVENOUS at 02:08

## 2024-08-06 RX ADMIN — SPIRONOLACTONE 100 MG: 50 TABLET ORAL at 02:08

## 2024-08-06 RX ADMIN — HEPARIN SODIUM 20 UNITS/KG/HR: 10000 INJECTION, SOLUTION INTRAVENOUS at 02:08

## 2024-08-06 RX ADMIN — HEPARIN SODIUM 20 UNITS/KG/HR: 10000 INJECTION, SOLUTION INTRAVENOUS at 05:08

## 2024-08-06 RX ADMIN — LIDOCAINE HYDROCHLORIDE 100 MG: 20 INJECTION INTRAVENOUS at 08:08

## 2024-08-06 RX ADMIN — GABAPENTIN 800 MG: 400 CAPSULE ORAL at 08:08

## 2024-08-06 RX ADMIN — PROPOFOL 80 MG: 10 INJECTION, EMULSION INTRAVENOUS at 08:08

## 2024-08-06 RX ADMIN — DULOXETINE HYDROCHLORIDE 30 MG: 30 CAPSULE, DELAYED RELEASE ORAL at 08:08

## 2024-08-06 RX ADMIN — IOHEXOL 100 ML: 350 INJECTION, SOLUTION INTRAVENOUS at 01:08

## 2024-08-07 ENCOUNTER — TELEPHONE (OUTPATIENT)
Dept: TRANSPLANT | Facility: CLINIC | Age: 74
End: 2024-08-07
Payer: MEDICARE

## 2024-08-07 VITALS
TEMPERATURE: 98 F | DIASTOLIC BLOOD PRESSURE: 73 MMHG | SYSTOLIC BLOOD PRESSURE: 145 MMHG | OXYGEN SATURATION: 95 % | HEIGHT: 67 IN | HEART RATE: 72 BPM | WEIGHT: 265 LBS | RESPIRATION RATE: 18 BRPM | BODY MASS INDEX: 41.59 KG/M2

## 2024-08-07 LAB
APTT PPP: 44.8 SEC (ref 21–32)
POCT GLUCOSE: 140 MG/DL (ref 70–110)
POCT GLUCOSE: 143 MG/DL (ref 70–110)
POCT GLUCOSE: 155 MG/DL (ref 70–110)

## 2024-08-07 PROCEDURE — 63600175 PHARM REV CODE 636 W HCPCS: Performed by: PHYSICIAN ASSISTANT

## 2024-08-07 PROCEDURE — 99232 SBSQ HOSP IP/OBS MODERATE 35: CPT | Mod: GC,,, | Performed by: INTERNAL MEDICINE

## 2024-08-07 PROCEDURE — 25000003 PHARM REV CODE 250: Performed by: HOSPITALIST

## 2024-08-07 PROCEDURE — 36415 COLL VENOUS BLD VENIPUNCTURE: CPT | Performed by: STUDENT IN AN ORGANIZED HEALTH CARE EDUCATION/TRAINING PROGRAM

## 2024-08-07 PROCEDURE — 85730 THROMBOPLASTIN TIME PARTIAL: CPT | Performed by: STUDENT IN AN ORGANIZED HEALTH CARE EDUCATION/TRAINING PROGRAM

## 2024-08-07 PROCEDURE — 63600175 PHARM REV CODE 636 W HCPCS: Performed by: STUDENT IN AN ORGANIZED HEALTH CARE EDUCATION/TRAINING PROGRAM

## 2024-08-07 PROCEDURE — 25000003 PHARM REV CODE 250: Performed by: STUDENT IN AN ORGANIZED HEALTH CARE EDUCATION/TRAINING PROGRAM

## 2024-08-07 RX ORDER — HYDROCORTISONE 25 MG/G
CREAM TOPICAL 2 TIMES DAILY
Qty: 28 G | Refills: 0 | Status: SHIPPED | OUTPATIENT
Start: 2024-08-07

## 2024-08-07 RX ORDER — ENOXAPARIN SODIUM 150 MG/ML
1 INJECTION SUBCUTANEOUS EVERY 12 HOURS
Qty: 48 ML | Refills: 0 | Status: SHIPPED | OUTPATIENT
Start: 2024-08-07

## 2024-08-07 RX ORDER — ENOXAPARIN SODIUM 150 MG/ML
1 INJECTION SUBCUTANEOUS EVERY 12 HOURS
Status: DISCONTINUED | OUTPATIENT
Start: 2024-08-07 | End: 2024-08-07 | Stop reason: HOSPADM

## 2024-08-07 RX ORDER — TALC
6 POWDER (GRAM) TOPICAL NIGHTLY PRN
Qty: 20 TABLET | Refills: 0 | Status: SHIPPED | OUTPATIENT
Start: 2024-08-07

## 2024-08-07 RX ORDER — SPIRONOLACTONE 100 MG/1
100 TABLET, FILM COATED ORAL DAILY
Qty: 30 TABLET | Refills: 1 | Status: SHIPPED | OUTPATIENT
Start: 2024-08-07

## 2024-08-07 RX ADMIN — FUROSEMIDE 40 MG: 10 INJECTION, SOLUTION INTRAVENOUS at 08:08

## 2024-08-07 RX ADMIN — ENOXAPARIN SODIUM 120 MG: 120 INJECTION SUBCUTANEOUS at 02:08

## 2024-08-07 RX ADMIN — SPIRONOLACTONE 100 MG: 50 TABLET ORAL at 08:08

## 2024-08-07 RX ADMIN — HEPARIN SODIUM 20 UNITS/KG/HR: 10000 INJECTION, SOLUTION INTRAVENOUS at 08:08

## 2024-08-07 RX ADMIN — LEVOTHYROXINE SODIUM 200 MCG: 100 TABLET ORAL at 05:08

## 2024-08-07 RX ADMIN — GABAPENTIN 800 MG: 400 CAPSULE ORAL at 08:08

## 2024-08-08 ENCOUNTER — TELEPHONE (OUTPATIENT)
Dept: HEMATOLOGY/ONCOLOGY | Facility: CLINIC | Age: 74
End: 2024-08-08
Payer: MEDICARE

## 2024-08-08 ENCOUNTER — TELEPHONE (OUTPATIENT)
Dept: HEPATOLOGY | Facility: CLINIC | Age: 74
End: 2024-08-08
Payer: MEDICARE

## 2024-08-08 ENCOUNTER — TELEPHONE (OUTPATIENT)
Facility: CLINIC | Age: 74
End: 2024-08-08
Payer: MEDICARE

## 2024-08-08 DIAGNOSIS — R16.0 LIVER MASS: Primary | ICD-10-CM

## 2024-08-08 NOTE — TELEPHONE ENCOUNTER
----- Message from Tiffanie Reed sent at 8/8/2024  3:19 PM CDT -----  Regarding: Consult/Advisory  Contact: Nishi  Consult/Advisory     Name Of Caller:Nishi        Contact Preference:[phone] (home)        Nature of call:Pt spouse has a lot of questions about pt next steps and biopsy appt on tomorrow, please advise. Requesting a call back.  Would like to speak  with provider.

## 2024-08-08 NOTE — TELEPHONE ENCOUNTER
Spoke with Nishi she stated that the pt saw Dr. Vazquez in the hospital and was discharged on yesterday. She stated that Dr. Vazquez advised her that the pt do not need to have the Biopsy done on tomorrow and still had questions in regards to if the pt should have the scheduled Biopsy done or not. Msg sent via secure chart to Dr. Vazquez and she stated that she will contact the pt. The pt was informed.

## 2024-08-09 ENCOUNTER — TELEPHONE (OUTPATIENT)
Dept: HEMATOLOGY/ONCOLOGY | Facility: CLINIC | Age: 74
End: 2024-08-09
Payer: MEDICARE

## 2024-08-09 LAB
ANTI SM ANTIBODY: 0.07 RATIO (ref 0–0.99)
ANTI SM/RNP ANTIBODY: 0.06 RATIO (ref 0–0.99)
ANTI-SM INTERPRETATION: NEGATIVE
ANTI-SM/RNP INTERPRETATION: NEGATIVE
ANTI-SSA ANTIBODY: 0.06 RATIO (ref 0–0.99)
ANTI-SSA INTERPRETATION: NEGATIVE
ANTI-SSB ANTIBODY: 0.07 RATIO (ref 0–0.99)
ANTI-SSB INTERPRETATION: NEGATIVE
CLINICAL BIOCHEMIST REVIEW: NORMAL
DSDNA AB SER-ACNC: NORMAL [IU]/ML
PLPETH BLD-MCNC: <10 NG/ML
POPETH BLD-MCNC: <10 NG/ML

## 2024-08-13 ENCOUNTER — TELEPHONE (OUTPATIENT)
Dept: INTERVENTIONAL RADIOLOGY/VASCULAR | Facility: CLINIC | Age: 74
End: 2024-08-13
Payer: MEDICARE

## 2024-08-13 ENCOUNTER — CONFERENCE (OUTPATIENT)
Dept: TRANSPLANT | Facility: CLINIC | Age: 74
End: 2024-08-13
Payer: MEDICARE

## 2024-08-13 ENCOUNTER — TELEPHONE (OUTPATIENT)
Dept: TRANSPLANT | Facility: CLINIC | Age: 74
End: 2024-08-13
Payer: MEDICARE

## 2024-08-13 DIAGNOSIS — C22.0 HEPATOMA: Primary | ICD-10-CM

## 2024-08-13 NOTE — TELEPHONE ENCOUNTER
Spoke to pts wife on phone, pt is currently in hospital, not doing well, WCB to schedule. Verbally understood thanks

## 2024-08-13 NOTE — TELEPHONE ENCOUNTER
--call to pt re radiology review today-pt with extensive HCC (large lesion, extensive tumor thrombus, possible lung mets; hx recent PE on lovenox)  --locoregional therapy in addition to systemic therapy can be considered-plan to have interventional radiology consult in addition to oncology assessment next week    --wife reports pt is sleeping+++; difficulty getting up  --recommended pt come to ER-may have hepatic encephalopathy; infection needs to be ruled out  --will need admission    Wife is agreeable and will bring the patient to the ER

## 2024-08-14 ENCOUNTER — TELEPHONE (OUTPATIENT)
Dept: UROLOGY | Facility: CLINIC | Age: 74
End: 2024-08-14
Payer: MEDICARE

## 2024-08-14 ENCOUNTER — HOSPITAL ENCOUNTER (INPATIENT)
Facility: HOSPITAL | Age: 74
LOS: 2 days | Discharge: HOME HEALTH | DRG: 441 | End: 2024-08-16
Attending: STUDENT IN AN ORGANIZED HEALTH CARE EDUCATION/TRAINING PROGRAM | Admitting: STUDENT IN AN ORGANIZED HEALTH CARE EDUCATION/TRAINING PROGRAM
Payer: MEDICARE

## 2024-08-14 DIAGNOSIS — K76.82 HEPATIC ENCEPHALOPATHY: Primary | ICD-10-CM

## 2024-08-14 DIAGNOSIS — K72.90 DECOMPENSATED HEPATIC CIRRHOSIS: ICD-10-CM

## 2024-08-14 DIAGNOSIS — K74.60 DECOMPENSATED HEPATIC CIRRHOSIS: ICD-10-CM

## 2024-08-14 DIAGNOSIS — R53.81 DEBILITY: ICD-10-CM

## 2024-08-14 DIAGNOSIS — C78.00 HEPATOCELLULAR CARCINOMA METASTATIC TO LUNG, UNSPECIFIED LATERALITY: ICD-10-CM

## 2024-08-14 DIAGNOSIS — I26.99 PULMONARY EMBOLISM, UNSPECIFIED CHRONICITY, UNSPECIFIED PULMONARY EMBOLISM TYPE, UNSPECIFIED WHETHER ACUTE COR PULMONALE PRESENT: ICD-10-CM

## 2024-08-14 DIAGNOSIS — C22.0 HEPATOCELLULAR CARCINOMA METASTATIC TO LUNG, UNSPECIFIED LATERALITY: ICD-10-CM

## 2024-08-14 PROBLEM — I81 PVT (PORTAL VEIN THROMBOSIS): Chronic | Status: ACTIVE | Noted: 2024-08-03

## 2024-08-14 PROBLEM — I10 ESSENTIAL HYPERTENSION: Chronic | Status: ACTIVE | Noted: 2024-08-05

## 2024-08-14 PROBLEM — Z86.711 HISTORY OF PULMONARY EMBOLISM: Status: ACTIVE | Noted: 2024-01-26

## 2024-08-14 PROBLEM — R18.8 ASCITES: Status: ACTIVE | Noted: 2024-08-14

## 2024-08-14 PROBLEM — I51.3 ATRIAL THROMBOSIS: Chronic | Status: ACTIVE | Noted: 2024-08-03

## 2024-08-14 PROBLEM — D69.6 THROMBOCYTOPENIA: Status: ACTIVE | Noted: 2024-08-14

## 2024-08-14 PROBLEM — N17.9 AKI (ACUTE KIDNEY INJURY): Status: ACTIVE | Noted: 2024-08-14

## 2024-08-14 PROBLEM — E66.9 OBESITY: Chronic | Status: ACTIVE | Noted: 2024-08-05

## 2024-08-14 PROBLEM — E03.9 HYPOTHYROIDISM: Chronic | Status: ACTIVE | Noted: 2022-01-03

## 2024-08-14 PROBLEM — Z86.711 HISTORY OF PULMONARY EMBOLISM: Chronic | Status: ACTIVE | Noted: 2024-01-26

## 2024-08-14 LAB
ALBUMIN SERPL BCP-MCNC: 2.8 G/DL (ref 3.5–5.2)
ALP SERPL-CCNC: 346 U/L (ref 55–135)
ALT SERPL W/O P-5'-P-CCNC: 290 U/L (ref 10–44)
AMMONIA PLAS-SCNC: 67 UMOL/L (ref 10–50)
ANION GAP SERPL CALC-SCNC: 13 MMOL/L (ref 8–16)
AST SERPL-CCNC: 430 U/L (ref 10–40)
BASOPHILS # BLD AUTO: 0.03 K/UL (ref 0–0.2)
BASOPHILS NFR BLD: 0.2 % (ref 0–1.9)
BILIRUB SERPL-MCNC: 1.7 MG/DL (ref 0.1–1)
BUN SERPL-MCNC: 36 MG/DL (ref 8–23)
CALCIUM SERPL-MCNC: 9.5 MG/DL (ref 8.7–10.5)
CHLORIDE SERPL-SCNC: 101 MMOL/L (ref 95–110)
CO2 SERPL-SCNC: 24 MMOL/L (ref 23–29)
CREAT SERPL-MCNC: 1.4 MG/DL (ref 0.5–1.4)
CREAT UR-MCNC: 145 MG/DL (ref 23–375)
CREAT UR-MCNC: 145 MG/DL (ref 23–375)
DIFFERENTIAL METHOD BLD: ABNORMAL
EOSINOPHIL # BLD AUTO: 0 K/UL (ref 0–0.5)
EOSINOPHIL NFR BLD: 0.2 % (ref 0–8)
EOSINOPHIL URNS QL WRIGHT STN: NORMAL
ERYTHROCYTE [DISTWIDTH] IN BLOOD BY AUTOMATED COUNT: 15 % (ref 11.5–14.5)
EST. GFR  (NO RACE VARIABLE): 53.1 ML/MIN/1.73 M^2
ESTIMATED AVG GLUCOSE: 151 MG/DL (ref 68–131)
GLUCOSE SERPL-MCNC: 118 MG/DL (ref 70–110)
HBA1C MFR BLD: 6.9 % (ref 4–5.6)
HCT VFR BLD AUTO: 38.2 % (ref 40–54)
HGB BLD-MCNC: 11.8 G/DL (ref 14–18)
IMM GRANULOCYTES # BLD AUTO: 0.08 K/UL (ref 0–0.04)
IMM GRANULOCYTES NFR BLD AUTO: 0.6 % (ref 0–0.5)
INR PPP: 1.5 (ref 0.8–1.2)
LYMPHOCYTES # BLD AUTO: 1 K/UL (ref 1–4.8)
LYMPHOCYTES NFR BLD: 7.9 % (ref 18–48)
MCH RBC QN AUTO: 24.5 PG (ref 27–31)
MCHC RBC AUTO-ENTMCNC: 30.9 G/DL (ref 32–36)
MCV RBC AUTO: 79 FL (ref 82–98)
MONOCYTES # BLD AUTO: 1.1 K/UL (ref 0.3–1)
MONOCYTES NFR BLD: 9.1 % (ref 4–15)
NEUTROPHILS # BLD AUTO: 10.1 K/UL (ref 1.8–7.7)
NEUTROPHILS NFR BLD: 82 % (ref 38–73)
NRBC BLD-RTO: 0 /100 WBC
PLATELET # BLD AUTO: 64 K/UL (ref 150–450)
PMV BLD AUTO: 11.8 FL (ref 9.2–12.9)
POCT GLUCOSE: 131 MG/DL (ref 70–110)
POCT GLUCOSE: 165 MG/DL (ref 70–110)
POTASSIUM SERPL-SCNC: 5.1 MMOL/L (ref 3.5–5.1)
PROT SERPL-MCNC: 7.1 G/DL (ref 6–8.4)
PROT UR-MCNC: 50 MG/DL (ref 0–15)
PROT/CREAT UR: 0.34 MG/G{CREAT} (ref 0–0.2)
PROTHROMBIN TIME: 15.8 SEC (ref 9–12.5)
RBC # BLD AUTO: 4.81 M/UL (ref 4.6–6.2)
SODIUM SERPL-SCNC: 138 MMOL/L (ref 136–145)
SODIUM UR-SCNC: 42 MMOL/L (ref 20–250)
UUN UR-MCNC: 1092 MG/DL (ref 140–1050)
WBC # BLD AUTO: 12.37 K/UL (ref 3.9–12.7)

## 2024-08-14 PROCEDURE — 85610 PROTHROMBIN TIME: CPT | Performed by: HOSPITALIST

## 2024-08-14 PROCEDURE — 82570 ASSAY OF URINE CREATININE: CPT | Performed by: HOSPITALIST

## 2024-08-14 PROCEDURE — 84540 ASSAY OF URINE/UREA-N: CPT | Performed by: HOSPITALIST

## 2024-08-14 PROCEDURE — 80053 COMPREHEN METABOLIC PANEL: CPT | Performed by: HOSPITALIST

## 2024-08-14 PROCEDURE — 36415 COLL VENOUS BLD VENIPUNCTURE: CPT | Performed by: HOSPITALIST

## 2024-08-14 PROCEDURE — 82140 ASSAY OF AMMONIA: CPT | Performed by: HOSPITALIST

## 2024-08-14 PROCEDURE — 84300 ASSAY OF URINE SODIUM: CPT | Performed by: HOSPITALIST

## 2024-08-14 PROCEDURE — 85025 COMPLETE CBC W/AUTO DIFF WBC: CPT | Performed by: HOSPITALIST

## 2024-08-14 PROCEDURE — 20600001 HC STEP DOWN PRIVATE ROOM

## 2024-08-14 PROCEDURE — 83036 HEMOGLOBIN GLYCOSYLATED A1C: CPT | Performed by: HOSPITALIST

## 2024-08-14 PROCEDURE — 63600175 PHARM REV CODE 636 W HCPCS: Performed by: HOSPITALIST

## 2024-08-14 PROCEDURE — 87205 SMEAR GRAM STAIN: CPT | Performed by: HOSPITALIST

## 2024-08-14 PROCEDURE — 99223 1ST HOSP IP/OBS HIGH 75: CPT | Mod: GC,,, | Performed by: STUDENT IN AN ORGANIZED HEALTH CARE EDUCATION/TRAINING PROGRAM

## 2024-08-14 PROCEDURE — 25000003 PHARM REV CODE 250: Performed by: HOSPITALIST

## 2024-08-14 RX ORDER — INSULIN ASPART 100 [IU]/ML
0-5 INJECTION, SOLUTION INTRAVENOUS; SUBCUTANEOUS
Status: DISCONTINUED | OUTPATIENT
Start: 2024-08-14 | End: 2024-08-16 | Stop reason: HOSPADM

## 2024-08-14 RX ORDER — IBUPROFEN 200 MG
16 TABLET ORAL
Status: DISCONTINUED | OUTPATIENT
Start: 2024-08-14 | End: 2024-08-16 | Stop reason: HOSPADM

## 2024-08-14 RX ORDER — SPIRONOLACTONE 100 MG/1
100 TABLET, FILM COATED ORAL DAILY
Status: DISCONTINUED | OUTPATIENT
Start: 2024-08-14 | End: 2024-08-16 | Stop reason: HOSPADM

## 2024-08-14 RX ORDER — GABAPENTIN 400 MG/1
800 CAPSULE ORAL 2 TIMES DAILY
Status: DISCONTINUED | OUTPATIENT
Start: 2024-08-14 | End: 2024-08-16 | Stop reason: HOSPADM

## 2024-08-14 RX ORDER — ACETAMINOPHEN 325 MG/1
650 TABLET ORAL EVERY 6 HOURS PRN
Status: DISCONTINUED | OUTPATIENT
Start: 2024-08-14 | End: 2024-08-14

## 2024-08-14 RX ORDER — ONDANSETRON HYDROCHLORIDE 2 MG/ML
4 INJECTION, SOLUTION INTRAVENOUS EVERY 6 HOURS PRN
Status: DISCONTINUED | OUTPATIENT
Start: 2024-08-14 | End: 2024-08-16 | Stop reason: HOSPADM

## 2024-08-14 RX ORDER — LEVOTHYROXINE SODIUM 100 UG/1
200 TABLET ORAL
Status: DISCONTINUED | OUTPATIENT
Start: 2024-08-15 | End: 2024-08-16 | Stop reason: HOSPADM

## 2024-08-14 RX ORDER — FUROSEMIDE 40 MG/1
40 TABLET ORAL 2 TIMES DAILY
Status: DISCONTINUED | OUTPATIENT
Start: 2024-08-14 | End: 2024-08-16 | Stop reason: HOSPADM

## 2024-08-14 RX ORDER — LACTULOSE 10 G/15ML
20 SOLUTION ORAL 3 TIMES DAILY
Status: DISCONTINUED | OUTPATIENT
Start: 2024-08-14 | End: 2024-08-16 | Stop reason: HOSPADM

## 2024-08-14 RX ORDER — IBUPROFEN 200 MG
24 TABLET ORAL
Status: DISCONTINUED | OUTPATIENT
Start: 2024-08-14 | End: 2024-08-16 | Stop reason: HOSPADM

## 2024-08-14 RX ORDER — ALBUTEROL SULFATE 90 UG/1
2 INHALANT RESPIRATORY (INHALATION) EVERY 6 HOURS PRN
Status: DISCONTINUED | OUTPATIENT
Start: 2024-08-14 | End: 2024-08-16 | Stop reason: HOSPADM

## 2024-08-14 RX ORDER — DULOXETIN HYDROCHLORIDE 30 MG/1
30 CAPSULE, DELAYED RELEASE ORAL NIGHTLY
Status: DISCONTINUED | OUTPATIENT
Start: 2024-08-14 | End: 2024-08-14

## 2024-08-14 RX ORDER — GLUCAGON 1 MG
1 KIT INJECTION
Status: DISCONTINUED | OUTPATIENT
Start: 2024-08-14 | End: 2024-08-16 | Stop reason: HOSPADM

## 2024-08-14 RX ORDER — ENOXAPARIN SODIUM 150 MG/ML
1 INJECTION SUBCUTANEOUS EVERY 12 HOURS
Status: DISCONTINUED | OUTPATIENT
Start: 2024-08-14 | End: 2024-08-15

## 2024-08-14 RX ORDER — ACETAMINOPHEN 500 MG
500 TABLET ORAL EVERY 6 HOURS PRN
Status: DISCONTINUED | OUTPATIENT
Start: 2024-08-14 | End: 2024-08-16 | Stop reason: HOSPADM

## 2024-08-14 RX ORDER — HYDROCODONE BITARTRATE AND ACETAMINOPHEN 10; 325 MG/1; MG/1
1 TABLET ORAL EVERY 6 HOURS PRN
Status: DISCONTINUED | OUTPATIENT
Start: 2024-08-14 | End: 2024-08-16 | Stop reason: HOSPADM

## 2024-08-14 RX ORDER — TALC
6 POWDER (GRAM) TOPICAL NIGHTLY PRN
Status: DISCONTINUED | OUTPATIENT
Start: 2024-08-14 | End: 2024-08-16 | Stop reason: HOSPADM

## 2024-08-14 RX ADMIN — RIFAXIMIN 550 MG: 550 TABLET ORAL at 08:08

## 2024-08-14 RX ADMIN — HYDROCODONE BITARTRATE AND ACETAMINOPHEN 1 TABLET: 10; 325 TABLET ORAL at 08:08

## 2024-08-14 RX ADMIN — FUROSEMIDE 40 MG: 40 TABLET ORAL at 08:08

## 2024-08-14 RX ADMIN — GABAPENTIN 800 MG: 400 CAPSULE ORAL at 08:08

## 2024-08-14 RX ADMIN — LACTULOSE 20 G: 20 SOLUTION ORAL at 03:08

## 2024-08-14 RX ADMIN — SPIRONOLACTONE 100 MG: 100 TABLET ORAL at 01:08

## 2024-08-14 RX ADMIN — ENOXAPARIN SODIUM 120 MG: 120 INJECTION SUBCUTANEOUS at 08:08

## 2024-08-14 NOTE — TELEPHONE ENCOUNTER
"Patient: Howard G Sistrunk       MRN: [id number]      : 1950     Age: 74 y.o.  [address] [facility] [place] [address]    Presenting Radiologists: Lenny Gastelum MD (Buck)    Providers: Monica Vazquez MD    Priority of review: Cancer    Patient Transplant Status: Hepatology    Reason for presentation: Indeterminate lesion    Clinical Summary: CT scan chest/abdo/pelvis done today reveals extensive tumor: an almost 13 cm right lobe liver lesion with multiple satellite lesions c/w HCC. Extensive thrombus involving the hepatic vasculature; additional thrombus extending from the right hepatic vein into the IVC and right atrium; additional thrombus extends inferiorly through the hepatic IVC and infrahepatic IVC. Findings are suspicious for tumor thrombus.  Likely has mets to the lung in addition to known pulmonary embolus. Does not have a large quantity of ascites.     I am recommending urgent outpt evaluation by hematology/oncology (referral to Dr Mcwilliams sent and his staff was contacted as well). May discharge home tomorrow. Will need AC with lovenox.    Imaging to be reviewed: triphasic CT Chest Abd 24, MRI w/wo Abd 24    HCC Treatment History: none    Platelets:   Lab Results   Component Value Date/Time     2024 02:29 AM     Creatinine:   Lab Results   Component Value Date/Time    CREATININE 1.1 2024 02:29 AM     Bilirubin:   Lab Results   Component Value Date/Time    BILITOT 0.5 2024 02:29 AM     AFP Last 3 each if available: No results found for: "AFP", "EXTAFP"    MELD: MELD 3.0: 12 at 2024  2:26 AM  MELD-Na: 9 at 2024  2:26 AM  Calculated from:  Serum Creatinine: 1.1 mg/dL at 2024  2:26 AM  Serum Sodium: 135 mmol/L at 2024  2:26 AM  Total Bilirubin: 0.5 mg/dL (Using min of 1 mg/dL) at 2024  2:26 AM  Serum Albumin: 2.7 g/dL at 2024  2:26 AM  INR(ratio): 1.2 at 8/3/2024  2:07 PM  Age at listing (hypothetical): 74 years  Sex: Male at 2024  2:26 " AM      Plan: 14 cm lesion in right lobe with tumor thrombus in the portal vein and right hepatic vein. 1.9 cm lesion in segment 6 consistent with HCC. Needs systemic treatment and can try Y90 but may require bland embo if lung shunt is high from hepatic vein invasion. There are a few pulmonary micronodules which are indeterminate and too small to biopsy    Committee Discussion: Very large lesion, 14 cm, with HV tumor thrombus involvement, extending up, at least to the inferior cava-atrial junction; not actually in the right atrium. Looks like portal involvement, as well. There are a couple of satellite lesions (1.9 cm).   Can consider Y90, but will need to assess lung shunt.    Lung nodules. The largest 7 mm right above the dome, so not a good spot for biopsy. Additional nodules, ~5 mm or less.    Plan: Proceed with Oncology consult as scheduled. IR consult for LRT    Note forwarded to Dr. Vazquez and staff to coordinate scheduling.     Follow-up Provider: Monica Vazquez MD

## 2024-08-14 NOTE — CONSULTS
[facility] - Telemetry Stepdown  Hematology/Oncology  Consult Note    Patient Name: Howard G Sistrunk  MRN: [id number]  Admission Date: 8/14/2024  Hospital Length of Stay: 0 days  Code Status: Prior   Attending Provider: Jase Ramires MD  Consulting Provider: Usman Manzanares MD  Primary Care Physician: Willard España MD  Principal Problem:Hepatic encephalopathy    Inpatient consult to Hematology/Oncology  Consult performed by: Usman Manzanares MD  Consult ordered by: Jase Ramires MD        Subjective:     HPI:  Mr. Howard G Sistrunk is a 74 y.o. male his is a 74-year-old male with a past medical history of advanced HCC, atrial thrombus (on Lovenox), PE, portal vein/hepatic vein and IVC tumor thrombi, type 2 diabetes, peripheral vascular disease who was transferred from [facility] for management of decompensated cirrhosis. He initially presented with AMS. He was recently admitted to [facility] where he was found to have a right atrial thrombus and bilateral segmental and subsegmental pulmonary emboli. Patient has not had a biopsy but CT triple phase on 8/6 revealed a 12.7 cm heterogenous mass lesion with multiple satellite lesions which is compatible with hepatocellular carcinoma. He was then discharged with outpatient oncology follow-up but developed AMS, abdominal pain, and distention which led to this current admission. Work up at [facility] revealed an elevated ammonia (116), leukocytosis (15.4), elevated creatinine (1.44- baseline around 1.1), elevated INR (1.7). CT head showed no acute process. CT abdomen and pelvis redemonstrated findings of hepatic mass, with extensive thrombosis of the hepatic and portal venous vasculature with extension into the IVC in the right atrium and small volume ascites. He was then transferred to [facility] for further care.           Oncology Treatment Plan:   [No matching plan found]    Medications:  Continuous Infusions:  Scheduled Meds:   enoxaparin  1 mg/kg Subcutaneous  Q12H    furosemide  40 mg Oral BID    lactulose  20 g Oral TID    [START ON 8/15/2024] levothyroxine  200 mcg Oral Before breakfast    rifAXImin  550 mg Oral BID    spironolactone  100 mg Oral Daily     PRN Meds:  Current Facility-Administered Medications:     acetaminophen, 500 mg, Oral, Q6H PRN    albuterol, 2 puff, Inhalation, Q6H PRN    dextrose 10%, 12.5 g, Intravenous, PRN    dextrose 10%, 25 g, Intravenous, PRN    glucagon (human recombinant), 1 mg, Intramuscular, PRN    glucose, 16 g, Oral, PRN    glucose, 24 g, Oral, PRN    insulin aspart U-100, 0-5 Units, Subcutaneous, QID (AC + HS) PRN    melatonin, 6 mg, Oral, Nightly PRN    ondansetron, 4 mg, Intravenous, Q6H PRN     Review of patient's allergies indicates:  No Known Allergies     Past Medical History:   Diagnosis Date    Atrial thrombosis 08/03/2024    Diabetes mellitus, type 2     Essential hypertension     HCC (hepatocellular carcinoma)     Hepatic encephalopathy 08/13/2024    Obesity     Portal vein thrombosis 08/03/2024    Pulmonary embolism 01/26/2024     Past Surgical History:   Procedure Laterality Date    ESOPHAGOGASTRODUODENOSCOPY N/A 8/6/2024    Procedure: EGD (ESOPHAGOGASTRODUODENOSCOPY);  Surgeon: Alexander Bernstein MD;  Location: [address];  Service: Endoscopy;  Laterality: N/A;     Family History    None       Tobacco Use    Smoking status: Not on file    Smokeless tobacco: Not on file   Substance and Sexual Activity    Alcohol use: Not on file    Drug use: Not on file    Sexual activity: Not on file       Review of Systems   Constitutional:  Negative for chills and fever.   HENT:  Negative for congestion and sore throat.    Eyes:  Negative for pain.   Respiratory:  Negative for cough and shortness of breath.    Cardiovascular:  Negative for chest pain, palpitations and leg swelling.   Gastrointestinal:  Positive for abdominal distention and abdominal pain. Negative for constipation, diarrhea, nausea and vomiting.    Genitourinary:  Negative for difficulty urinating, dysuria and hematuria.   Musculoskeletal:  Negative for back pain.   Skin:  Negative for rash.   Neurological:  Negative for light-headedness and headaches.   Hematological:  Does not bruise/bleed easily.   Psychiatric/Behavioral:  Positive for confusion. Negative for agitation.      Objective:     Vital Signs (Most Recent):  Temp: 97.8 °F (36.6 °C) (08/14/24 1204)  Pulse: 103 (08/14/24 1204)  Resp: 16 (08/14/24 1204)  BP: (!) 175/94 (08/14/24 1204)  SpO2: (!) 89 % (08/14/24 1204) Vital Signs (24h Range):  Temp:  [97.7 °F (36.5 °C)-99.2 °F (37.3 °C)] 97.8 °F (36.6 °C)  Pulse:  [102-107] 103  Resp:  [16-18] 16  SpO2:  [89 %-97 %] 89 %  BP: (122-175)/(64-94) 175/94     Weight: 113.8 kg (250 lb 14.1 oz)  Body mass index is 34.03 kg/m².  Body surface area is 2.4 meters squared.    No intake or output data in the 24 hours ending 08/14/24 1518     Physical Exam  Vitals and nursing note reviewed.   Constitutional:       General: He is not in acute distress.     Appearance: He is well-developed. He is not diaphoretic.      Interventions: He is not intubated.  HENT:      Head: Normocephalic and atraumatic.   Eyes:      General: No scleral icterus.     Conjunctiva/sclera: Conjunctivae normal.   Cardiovascular:      Rate and Rhythm: Normal rate and regular rhythm.      Heart sounds: Normal heart sounds. No murmur heard.  Pulmonary:      Effort: Pulmonary effort is normal. No accessory muscle usage or respiratory distress. He is not intubated.   Abdominal:      General: There is distension.      Palpations: Abdomen is soft.      Tenderness: There is no abdominal tenderness. There is no guarding.   Musculoskeletal:         General: No deformity or signs of injury.      Right lower leg: Edema present.      Left lower leg: Edema present.   Skin:     General: Skin is warm and dry.      Coloration: Skin is not jaundiced or pale.   Neurological:      Mental Status: He is alert. He  is confused.      Motor: No seizure activity.      Comments: Oriented to person and place, not year   Psychiatric:         Speech: Speech is delayed.         Behavior: Behavior is not aggressive or combative.         Cognition and Memory: Cognition is impaired.          Significant Labs:   All pertinent labs from the last 24 hours have been reviewed.    Diagnostic Results:  I have reviewed all pertinent imaging results/findings within the past 24 hours.  Assessment/Plan:     Hepatocellular carcinoma  Patient with newly diagnosed HCC (based on CT on 8/6/24, no biopsy) who is currently admitted with decompensated liver failure. He intially presented with AMS to [facility]. Workup revealed an ammonia of 116 and INR of 1.7. Patient was recently admitted to [facility] and was discharged with follow-up with oncology on 8/19.     - On lactulose and rifaximin for elevated ammonia, mental status almost at baseline   - Transplant meeting on 8/13 recommended systemic therapy with possible LRT by IR   - No indication for inpatient treatment   - CT CAP on 8/6 confirmed diagnoses of HCC, there were also bilateral pulmonary nodules up to 0.8cm concerning for mets   - Patient has follow-up with oncology on 8/19  - Recommend continuing lactulose while inpatient and on discharge for goal 3-4 BM's per day   - On lasix and spironolactone for ascites         Thank you for your consult. I will sign off. Please contact us if you have any additional questions.    Usman Manzanares MD  Hematology/Oncology  [facility] - Telemetry Stepdown

## 2024-08-14 NOTE — NURSING
Patient arrived via ambulance transport. 174/94 BP. Skin intact. Patient appears sleepy. Arouses to voice.

## 2024-08-14 NOTE — TELEPHONE ENCOUNTER
Pt's wife notified that pt is not a candidate for Peyronie's treatment. Verbalized understanding and agreed to appt cancellation.     ----- Message from David Agarwal MA sent at 8/13/2024 11:48 AM CDT -----    ----- Message -----  From: Krishna Ross MD  Sent: 8/13/2024   8:49 AM CDT  To: [facility] TEJADA Staff    Mr. Sistrunk has advanced liver cancer and likely cirrhosis.  He's too sick to do anything for peyronie's.  He shouldn't be coming in to see me for peyronie's.    Krishna

## 2024-08-14 NOTE — ASSESSMENT & PLAN NOTE
Small volume seen on recent imaging but abdomen seems distended. Request IR to perform paracentesis to evaluate for SBP, considering leukocytosis.

## 2024-08-14 NOTE — ASSESSMENT & PLAN NOTE
Patient with newly diagnosed HCC (based on CT on 8/6/24, no biopsy) who is currently admitted with decompensated liver failure. He intially presented with AMS to [facility]. Workup revealed an ammonia of 116 and INR of 1.7. Patient was recently admitted [place] [facility] and was discharged with follow-up with oncology on 8/19.     - On lactulose and rifaximin for elevated ammonia, mental status almost at baseline   - Transplant meeting on 8/13 recommended systemic therapy with possible LRT by IR   - No indication for inpatient treatment   - CT CAP on 8/6 confirmed diagnoses of HCC, there were also bilateral pulmonary nodules up to 0.8cm concerning for mets   - Patient has follow-up with oncology on 8/19  - Recommend continuing lactulose while inpatient and on discharge for goal 3-4 BM's per day   - On lasix and spironolactone for ascites

## 2024-08-14 NOTE — HPI
Howard G Sistrunk is a 74 year old white man with hypertension, diabetes mellitus type 2, hypothyroidism, obesity, hepatocellular carcinoma, pulmonary embolism, portal vein thrombosis, atrial thrombosis (anticoagulated on enoxaparin), iron deficiency anemia. He lives in [place], [place]. He is . His primary care physician is Willard España.     He presented to [facility] on 8/13/2024 with altered mental status, abdominal pain and distension. Labs showed elevated ammonia (116 umol/L), leukocytosis (WBC 21303/uL from 8610 on 8/6/2024), elevated creatinine (1.44 mg/dL from 1.1), elevated INR (1.7). he was given lactulose. He had an upcoming appointment in [facility] Hematology-Oncology clinic on 8/19/2024 for his hepatocellular carcinoma. His case was discussed with Hematology-Oncology at [facility]. He was transferred to [facility] on 8/14/2024 for Hematology-Oncology evaluation. He was admitted to Hospital Medicine Team L.

## 2024-08-14 NOTE — HPI
Mr. Howard G Sistrunk is a 74 y.o. male his is a 74-year-old male with a past medical history of advanced HCC, atrial thrombus (on Lovenox), PE, portal vein/hepatic vein and IVC tumor thrombi, type 2 diabetes, peripheral vascular disease who was transferred from [facility] for management of decompensated cirrhosis. He initially presented with AMS. He was recently admitted to [facility] where he was found to have a right atrial thrombus and bilateral segmental and subsegmental pulmonary emboli. Patient has not had a biopsy but CT triple phase on 8/6 revealed a 12.7 cm heterogenous mass lesion with multiple satellite lesions which is compatible with hepatocellular carcinoma. He was then discharged with outpatient oncology follow-up but developed AMS, abdominal pain, and distention which led to this current admission. Work up at [facility] revealed an elevated ammonia (116), leukocytosis (15.4), elevated creatinine (1.44- baseline around 1.1), elevated INR (1.7). CT head showed no acute process. CT abdomen and pelvis redemonstrated findings of hepatic mass, with extensive thrombosis of the hepatic and portal venous vasculature with extension into the IVC in the right atrium and small volume ascites. He was then transferred to [facility] for further care.

## 2024-08-14 NOTE — SUBJECTIVE & OBJECTIVE
Oncology Treatment Plan:   [No matching plan found]    Medications:  Continuous Infusions:  Scheduled Meds:   enoxaparin  1 mg/kg Subcutaneous Q12H    furosemide  40 mg Oral BID    lactulose  20 g Oral TID    [START ON 8/15/2024] levothyroxine  200 mcg Oral Before breakfast    rifAXImin  550 mg Oral BID    spironolactone  100 mg Oral Daily     PRN Meds:  Current Facility-Administered Medications:     acetaminophen, 500 mg, Oral, Q6H PRN    albuterol, 2 puff, Inhalation, Q6H PRN    dextrose 10%, 12.5 g, Intravenous, PRN    dextrose 10%, 25 g, Intravenous, PRN    glucagon (human recombinant), 1 mg, Intramuscular, PRN    glucose, 16 g, Oral, PRN    glucose, 24 g, Oral, PRN    insulin aspart U-100, 0-5 Units, Subcutaneous, QID (AC + HS) PRN    melatonin, 6 mg, Oral, Nightly PRN    ondansetron, 4 mg, Intravenous, Q6H PRN     Review of patient's allergies indicates:  No Known Allergies     Past Medical History:   Diagnosis Date    Atrial thrombosis 08/03/2024    Diabetes mellitus, type 2     Essential hypertension     HCC (hepatocellular carcinoma)     Hepatic encephalopathy 08/13/2024    Obesity     Portal vein thrombosis 08/03/2024    Pulmonary embolism 01/26/2024     Past Surgical History:   Procedure Laterality Date    ESOPHAGOGASTRODUODENOSCOPY N/A 8/6/2024    Procedure: EGD (ESOPHAGOGASTRODUODENOSCOPY);  Surgeon: Alexander Bernstein MD;  Location: [address];  Service: Endoscopy;  Laterality: N/A;     Family History    None       Tobacco Use    Smoking status: Not on file    Smokeless tobacco: Not on file   Substance and Sexual Activity    Alcohol use: Not on file    Drug use: Not on file    Sexual activity: Not on file       Review of Systems   Constitutional:  Negative for chills and fever.   HENT:  Negative for congestion and sore throat.    Eyes:  Negative for pain.   Respiratory:  Negative for cough and shortness of breath.    Cardiovascular:  Negative for chest pain, palpitations and leg swelling.    Gastrointestinal:  Positive for abdominal distention and abdominal pain. Negative for constipation, diarrhea, nausea and vomiting.   Genitourinary:  Negative for difficulty urinating, dysuria and hematuria.   Musculoskeletal:  Negative for back pain.   Skin:  Negative for rash.   Neurological:  Negative for light-headedness and headaches.   Hematological:  Does not bruise/bleed easily.   Psychiatric/Behavioral:  Positive for confusion. Negative for agitation.      Objective:     Vital Signs (Most Recent):  Temp: 97.8 °F (36.6 °C) (08/14/24 1204)  Pulse: 103 (08/14/24 1204)  Resp: 16 (08/14/24 1204)  BP: (!) 175/94 (08/14/24 1204)  SpO2: (!) 89 % (08/14/24 1204) Vital Signs (24h Range):  Temp:  [97.7 °F (36.5 °C)-99.2 °F (37.3 °C)] 97.8 °F (36.6 °C)  Pulse:  [102-107] 103  Resp:  [16-18] 16  SpO2:  [89 %-97 %] 89 %  BP: (122-175)/(64-94) 175/94     Weight: 113.8 kg (250 lb 14.1 oz)  Body mass index is 34.03 kg/m².  Body surface area is 2.4 meters squared.    No intake or output data in the 24 hours ending 08/14/24 1518     Physical Exam  Vitals and nursing note reviewed.   Constitutional:       General: He is not in acute distress.     Appearance: He is well-developed. He is not diaphoretic.      Interventions: He is not intubated.  HENT:      Head: Normocephalic and atraumatic.   Eyes:      General: No scleral icterus.     Conjunctiva/sclera: Conjunctivae normal.   Cardiovascular:      Rate and Rhythm: Normal rate and regular rhythm.      Heart sounds: Normal heart sounds. No murmur heard.  Pulmonary:      Effort: Pulmonary effort is normal. No accessory muscle usage or respiratory distress. He is not intubated.   Abdominal:      General: There is distension.      Palpations: Abdomen is soft.      Tenderness: There is no abdominal tenderness. There is no guarding.   Musculoskeletal:         General: No deformity or signs of injury.      Right lower leg: Edema present.      Left lower leg: Edema present.   Skin:      General: Skin is warm and dry.      Coloration: Skin is not jaundiced or pale.   Neurological:      Mental Status: He is alert. He is confused.      Motor: No seizure activity.      Comments: Oriented to person and place, not year   Psychiatric:         Speech: Speech is delayed.         Behavior: Behavior is not aggressive or combative.         Cognition and Memory: Cognition is impaired.          Significant Labs:   All pertinent labs from the last 24 hours have been reviewed.    Diagnostic Results:  I have reviewed all pertinent imaging results/findings within the past 24 hours.

## 2024-08-14 NOTE — NURSING
Nurses Note -- 4 Eyes      8/14/2024   12:20 PM      Skin assessed during: Admit      [x] No Altered Skin Integrity Present    []Prevention Measures Documented      [] Yes- Altered Skin Integrity Present or Discovered   [] LDA Added if Not in Epic (Describe Wound)   [] New Altered Skin Integrity was Present on Admit and Documented in LDA   [] Wound Image Taken    Wound Care Consulted? No    Attending Nurse:  Tania Davalos RN/Staff Member:  Stephanie

## 2024-08-14 NOTE — ASSESSMENT & PLAN NOTE
Check UA, urine creatinine, urine sodium, urine urea, urine protein/creatinine ratio, urine eosinophils, retroperitoneal ultrasound. Monitor labs.

## 2024-08-14 NOTE — SUBJECTIVE & OBJECTIVE
Past Medical History:   Diagnosis Date    Atrial thrombosis 08/03/2024    Diabetes mellitus, type 2     Essential hypertension     HCC (hepatocellular carcinoma)     Hepatic encephalopathy 08/13/2024    Obesity     Portal vein thrombosis 08/03/2024    Pulmonary embolism 01/26/2024       Past Surgical History:   Procedure Laterality Date    ESOPHAGOGASTRODUODENOSCOPY N/A 8/6/2024    Procedure: EGD (ESOPHAGOGASTRODUODENOSCOPY);  Surgeon: Alexander Bernstein MD;  Location: [address]);  Service: Endoscopy;  Laterality: N/A;       Review of patient's allergies indicates:  No Known Allergies    No current facility-administered medications on file prior to encounter.     Current Outpatient Medications on File Prior to Encounter   Medication Sig    albuterol (PROVENTIL/VENTOLIN HFA) 90 mcg/actuation inhaler Inhale 2 puffs into the lungs every 6 (six) hours as needed for Wheezing. Rescue    DULoxetine (CYMBALTA) 30 MG capsule Take 30 mg by mouth nightly.    enoxaparin (LOVENOX) 120 mg/0.8 mL Syrg Inject 0.8 mLs (120 mg total) into the skin every 12 (twelve) hours.    furosemide (LASIX) 40 MG tablet Take 40 mg by mouth 2 (two) times daily.    gabapentin (NEURONTIN) 800 MG tablet Take 1,600 mg by mouth 2 (two) times daily.    HYDROcodone-acetaminophen (NORCO)  mg per tablet Take 1 tablet by mouth every 6 (six) hours as needed for Pain.    hydrocortisone 2.5 % cream Apply topically 2 (two) times daily.    levothyroxine (SYNTHROID) 200 MCG tablet Take 200 mcg by mouth before breakfast.    melatonin (MELATIN) 3 mg tablet Take 2 tablets (6 mg total) by mouth nightly as needed for Insomnia.    metFORMIN (GLUCOPHAGE) 1000 MG tablet Take 1,000 mg by mouth 2 (two) times daily with meals.    pramipexole di-HCl (MIRAPEX ORAL) Take 1 mg by mouth every evening.    spironolactone (ALDACTONE) 100 MG tablet Take 1 tablet (100 mg total) by mouth once daily.     Family History    None       Tobacco Use    Smoking status: Not on  file    Smokeless tobacco: Not on file   Substance and Sexual Activity    Alcohol use: Not on file    Drug use: Not on file    Sexual activity: Not on file     Review of Systems   Unable to perform ROS: Mental status change     Objective:     Vital Signs (Most Recent):  Temp: 97.8 °F (36.6 °C) (08/14/24 1204)  Pulse: 103 (08/14/24 1204)  Resp: 16 (08/14/24 1204)  BP: (!) 175/94 (08/14/24 1204)  SpO2: (!) 89 % (08/14/24 1204) Vital Signs (24h Range):  Temp:  [97.7 °F (36.5 °C)-99.2 °F (37.3 °C)] 97.8 °F (36.6 °C)  Pulse:  [102-107] 103  Resp:  [16-18] 16  SpO2:  [89 %-97 %] 89 %  BP: (122-175)/(64-94) 175/94        There is no height or weight on file to calculate BMI.     Physical Exam  Vitals and nursing note reviewed.   Constitutional:       General: He is not in acute distress.     Appearance: He is well-developed. He is obese. He is not diaphoretic.      Interventions: He is not intubated.  HENT:      Head: Normocephalic and atraumatic.   Eyes:      General: No scleral icterus.     Conjunctiva/sclera: Conjunctivae normal.   Cardiovascular:      Rate and Rhythm: Normal rate and regular rhythm.      Heart sounds: Normal heart sounds. No murmur heard.  Pulmonary:      Effort: Pulmonary effort is normal. No accessory muscle usage or respiratory distress. He is not intubated.   Abdominal:      General: There is distension.      Palpations: Abdomen is soft.      Tenderness: There is no abdominal tenderness. There is no guarding.   Musculoskeletal:         General: No deformity or signs of injury.      Right lower leg: Edema present.      Left lower leg: Edema present.   Skin:     General: Skin is warm and dry.      Coloration: Skin is not jaundiced or pale.   Neurological:      Mental Status: He is lethargic, disoriented and confused.      Motor: No seizure activity.   Psychiatric:         Speech: Speech is delayed.         Behavior: Behavior is not aggressive or combative.         Cognition and Memory: Cognition is  impaired.                Significant Labs: All pertinent labs within the past 24 hours have been reviewed.    Significant Imaging: I have reviewed all pertinent imaging results/findings within the past 24 hours.

## 2024-08-14 NOTE — H&P
[facility] Medicine  History & Physical    Patient Name: Howard G Sistrunk  MRN: [id number]  Patient Class: IP- Inpatient  Admission Date: 8/14/2024  Attending Physician: Gama Darden MD   Primary Care Provider: Willard España MD         Patient information was obtained from patient, past medical records, and ER records.     Subjective:     Principal Problem:Hepatic encephalopathy    Chief Complaint:   Chief Complaint   Patient presents with    Hepatic Cancer        HPI: Howard G Sistrunk is a 74 year old white man with hypertension, diabetes mellitus type 2, hypothyroidism, obesity, hepatocellular carcinoma, pulmonary embolism, portal vein thrombosis, atrial thrombosis (anticoagulated on enoxaparin), iron deficiency anemia. He lives in [place], [place]. He is . His primary care physician is Willard España.   He presented to [facility] Emergency Department on 8/13/2024 with altered mental status, abdominal pain and distension. Labs showed elevated ammonia (116 umol/L), leukocytosis (WBC 99531/uL from 8610 on 8/6/2024), elevated creatinine (1.44 mg/dL from 1.1), elevated INR (1.7). he was given lactulose. He had an upcoming appointment in [facility] Hematology-Oncology clinic on 8/19/2024 for his hepatocellular carcinoma. His case was discussed with Hematology-Oncology at [facility]. He was transferred to [facility] on 8/14/2024 for Hematology-Oncology evaluation. He was admitted to Hospital Medicine Team L.     Past Medical History:   Diagnosis Date    Atrial thrombosis 08/03/2024    Diabetes mellitus, type 2     Essential hypertension     HCC (hepatocellular carcinoma)     Hepatic encephalopathy 08/13/2024    Obesity     Portal vein thrombosis 08/03/2024    Pulmonary embolism 01/26/2024       Past Surgical History:   Procedure Laterality Date    ESOPHAGOGASTRODUODENOSCOPY N/A  8/6/2024    Procedure: EGD (ESOPHAGOGASTRODUODENOSCOPY);  Surgeon: Alexander Bernstein MD;  Location: [facility] ([address] [place] [address]);  Service: Endoscopy;  Laterality: N/A;       Review of patient's allergies indicates:  No Known Allergies    No current facility-administered medications on file prior to encounter.     Current Outpatient Medications on File Prior to Encounter   Medication Sig    albuterol (PROVENTIL/VENTOLIN HFA) 90 mcg/actuation inhaler Inhale 2 puffs into the lungs every 6 (six) hours as needed for Wheezing. Rescue    DULoxetine (CYMBALTA) 30 MG capsule Take 30 mg by mouth nightly.    enoxaparin (LOVENOX) 120 mg/0.8 mL Syrg Inject 0.8 mLs (120 mg total) into the skin every 12 (twelve) hours.    furosemide (LASIX) 40 MG tablet Take 40 mg by mouth 2 (two) times daily.    gabapentin (NEURONTIN) 800 MG tablet Take 1,600 mg by mouth 2 (two) times daily.    HYDROcodone-acetaminophen (NORCO)  mg per tablet Take 1 tablet by mouth every 6 (six) hours as needed for Pain.    hydrocortisone 2.5 % cream Apply topically 2 (two) times daily.    levothyroxine (SYNTHROID) 200 MCG tablet Take 200 mcg by mouth before breakfast.    melatonin (MELATIN) 3 mg tablet Take 2 tablets (6 mg total) by mouth nightly as needed for Insomnia.    metFORMIN (GLUCOPHAGE) 1000 MG tablet Take 1,000 mg by mouth 2 (two) times daily with meals.    pramipexole di-HCl (MIRAPEX ORAL) Take 1 mg by mouth every evening.    spironolactone (ALDACTONE) 100 MG tablet Take 1 tablet (100 mg total) by mouth once daily.     Family History    None       Tobacco Use    Smoking status: Not on file    Smokeless tobacco: Not on file   Substance and Sexual Activity    Alcohol use: Not on file    Drug use: Not on file    Sexual activity: Not on file     Review of Systems   Unable to perform ROS: Mental status change     Objective:     Vital Signs (Most Recent):  Temp: 97.8 °F (36.6 °C) (08/14/24 1204)  Pulse: 103 (08/14/24 1204)  Resp: 16 (08/14/24 1204)  BP:  (!) 175/94 (08/14/24 1204)  SpO2: (!) 89 % (08/14/24 1204) Vital Signs (24h Range):  Temp:  [97.7 °F (36.5 °C)-99.2 °F (37.3 °C)] 97.8 °F (36.6 °C)  Pulse:  [102-107] 103  Resp:  [16-18] 16  SpO2:  [89 %-97 %] 89 %  BP: (122-175)/(64-94) 175/94        There is no height or weight on file to calculate BMI.     Physical Exam  Vitals and nursing note reviewed.   Constitutional:       General: He is not in acute distress.     Appearance: He is well-developed. He is obese. He is not diaphoretic.      Interventions: He is not intubated.  HENT:      Head: Normocephalic and atraumatic.   Eyes:      General: No scleral icterus.     Conjunctiva/sclera: Conjunctivae normal.   Cardiovascular:      Rate and Rhythm: Normal rate and regular rhythm.      Heart sounds: Normal heart sounds. No murmur heard.  Pulmonary:      Effort: Pulmonary effort is normal. No accessory muscle usage or respiratory distress. He is not intubated.   Abdominal:      General: There is distension.      Palpations: Abdomen is soft.      Tenderness: There is no abdominal tenderness. There is no guarding.   Musculoskeletal:         General: No deformity or signs of injury.      Right lower leg: Edema present.      Left lower leg: Edema present.   Skin:     General: Skin is warm and dry.      Coloration: Skin is not jaundiced or pale.   Neurological:      Mental Status: He is lethargic, disoriented and confused.      Motor: No seizure activity.   Psychiatric:         Speech: Speech is delayed.         Behavior: Behavior is not aggressive or combative.         Cognition and Memory: Cognition is impaired.                Significant Labs: All pertinent labs within the past 24 hours have been reviewed.    Significant Imaging: I have reviewed all pertinent imaging results/findings within the past 24 hours.  Assessment/Plan:     * Hepatic encephalopathy  Currently encephalopathic. Repeat ammonia now. Continue giving lactulose. Also give rifaximin for  now.    Ascites  Small volume seen on recent imaging but abdomen seems distended. Request IR to perform paracentesis to evaluate for SBP, considering leukocytosis.      ELAINE (acute kidney injury)  Check UA, urine creatinine, urine sodium, urine urea, urine protein/creatinine ratio, urine eosinophils, retroperitoneal ultrasound. Monitor labs.    Diabetes mellitus, type 2  Hod home metformin. Insulin sliding scale. Monitor glucose.    Essential hypertension  Chronic. Continue home furosemide and spironolactone. Monitor BP.    Hypothyroidism  Continue home levothyroxine.      Atrial thrombosis  Anticoagulated on enoxaparin.      Hepatocellular carcinoma  Transferred here for Hematology-Oncology. Consult Hematology-Oncology.      History of pulmonary embolism  Anticoagulated on enoxaparin.      PVT (portal vein thrombosis)  Anticoagulated on enoxaparin.        VTE Risk Mitigation (From admission, onward)           Ordered     enoxaparin injection 120 mg  Every 12 hours         08/14/24 1241                                    Jase Ramires MD  Department of Hospital Medicine  [facility] - Telemetry Stepdown

## 2024-08-14 NOTE — ASSESSMENT & PLAN NOTE
Currently encephalopathic. Repeat ammonia now. Continue giving lactulose. Also give rifaximin for now.

## 2024-08-14 NOTE — PLAN OF CARE
Problem: Adult Inpatient Plan of Care  Goal: Plan of Care Review  Outcome: Progressing  Goal: Patient-Specific Goal (Individualized)  Outcome: Progressing  Goal: Absence of Hospital-Acquired Illness or Injury  Outcome: Progressing  Goal: Optimal Comfort and Wellbeing  Outcome: Progressing  Goal: Readiness for Transition of Care  Outcome: Progressing     Problem: Diabetes Comorbidity  Goal: Blood Glucose Level Within Targeted Range  Outcome: Progressing     Problem: Acute Kidney Injury/Impairment  Goal: Fluid and Electrolyte Balance  Outcome: Progressing  Goal: Improved Oral Intake  Outcome: Progressing  Goal: Effective Renal Function  Outcome: Progressing     Problem: Skin Injury Risk Increased  Goal: Skin Health and Integrity  Outcome: Progressing     Problem: Infection  Goal: Absence of Infection Signs and Symptoms  Outcome: Progressing

## 2024-08-14 NOTE — PROVIDER TRANSFER
Outside Transfer Acceptance Note / Regional Referral Center    Referring facility: [facility]   Referring provider: KEVIN JOSHUA  Accepting facility: [facility]  Accepting provider: GAMA DARDEN  Admitting provider: Gama Darden  Reason for transfer: Higher Level of Care   Transfer diagnosis: Metabolic encephalopathy  Transfer specialty requested: Hematology and Oncology  Transfer specialty notified: Yes  Transfer level: NUMBER 1-5: 2  Bed type requested: Med/Tele  Isolation status: No active isolations   Admission class or status: IP- Inpatient      Narrative     This is a 74-year-old male with a past medical history of advanced HCC, atrial thrombus (on Lovenox), PE, 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  Request to transfer to [facility] for heme/Onc evaluation.    Patient had a recent hospitalization a [facility] (8/3-8/8) after presenting with lower extremity swelling.  While hospitalized, he was found to have a right atrial thrombus and bilateral segmental and subsegmental pulmonary emboli.  He had a prior diagnosis of PE & a liver mass, which was concerning for HCC.  He underwent an EGD on 08/06 that showed erythematous mucosa in the stomach, but was otherwise unremarkable.  Hepatology was consulted and thought the patient would benefit from systemic therapy +/-local regional therapy.  Patient was discharged to establish outpatient follow-up with Oncology (appointment scheduled on 08/19). On 8/13, transplant conference determined the plan is to proceed with Oncology consult as scheduled. IR consult for LRT.     He presents to [facility] ED for evaluation of AMS, abdominal pain and distension.  On arrival, patient was A&Ox1. Vitals are stable. Labs were remarkable for an elevated ammonia (116), leukocytosis (15.4), elevated creatinine (1.44- baseline  around 1.1), elevated INR (1.7). CT head showed no acute process. CT abdomen and pelvis redemonstrated findings of hepatic mass, with extensive thrombosis of the hepatic and portal venous vasculature with extension into the IVC in the right atrium and small volume ascites. UA unremarkable.  UDS was positive for opiates. He received a dose of lactulose after which his mental status improved. Patient was transferred to [facility] for continuation of management.       Objective     Vitals:  stable  Recent Labs: All pertinent labs within the past 24 hours have been reviewed.  Recent imaging: above     IV access:    Infusions: N/A  Allergies: Review of patient's allergies indicates:  No Known Allergies   NPO: No    Anticoagulation:   Anticoagulants       None             Instructions      [facility]-  Admit to Hospital Medicine  Upon patient arrival to floor, please send SecureChat to [facility] or call extension [phone] (if no answer, do NOT leave a callback number after the beep, rather please send a SecureChat to [facility]), for Hospital Medicine admit team assignment and for additional admit orders for the patient.  Do not page the attending physician associated with the patient on arrival (this physician may not be on duty at the time of arrival).  Rather, always send a SecureChat to [facility] or call [phone] to reach the triage physician for orders and team assignment.

## 2024-08-15 LAB
ALBUMIN SERPL BCP-MCNC: 2.6 G/DL (ref 3.5–5.2)
ALP SERPL-CCNC: 322 U/L (ref 55–135)
ALT SERPL W/O P-5'-P-CCNC: 295 U/L (ref 10–44)
AMMONIA PLAS-SCNC: 83 UMOL/L (ref 10–50)
ANION GAP SERPL CALC-SCNC: 12 MMOL/L (ref 8–16)
AST SERPL-CCNC: 401 U/L (ref 10–40)
BASOPHILS # BLD AUTO: 0.04 K/UL (ref 0–0.2)
BASOPHILS NFR BLD: 0.4 % (ref 0–1.9)
BILIRUB SERPL-MCNC: 1.1 MG/DL (ref 0.1–1)
BUN SERPL-MCNC: 39 MG/DL (ref 8–23)
CALCIUM SERPL-MCNC: 9.3 MG/DL (ref 8.7–10.5)
CHLORIDE SERPL-SCNC: 102 MMOL/L (ref 95–110)
CO2 SERPL-SCNC: 25 MMOL/L (ref 23–29)
CREAT SERPL-MCNC: 1.3 MG/DL (ref 0.5–1.4)
DIFFERENTIAL METHOD BLD: ABNORMAL
EOSINOPHIL # BLD AUTO: 0.1 K/UL (ref 0–0.5)
EOSINOPHIL NFR BLD: 0.5 % (ref 0–8)
ERYTHROCYTE [DISTWIDTH] IN BLOOD BY AUTOMATED COUNT: 15.2 % (ref 11.5–14.5)
ERYTHROCYTE [DISTWIDTH] IN BLOOD BY AUTOMATED COUNT: 15.2 % (ref 11.5–14.5)
EST. GFR  (NO RACE VARIABLE): 58 ML/MIN/1.73 M^2
GLUCOSE SERPL-MCNC: 105 MG/DL (ref 70–110)
HCT VFR BLD AUTO: 37.6 % (ref 40–54)
HCT VFR BLD AUTO: 37.6 % (ref 40–54)
HGB BLD-MCNC: 11.3 G/DL (ref 14–18)
HGB BLD-MCNC: 11.3 G/DL (ref 14–18)
IMM GRANULOCYTES # BLD AUTO: 0.07 K/UL (ref 0–0.04)
IMM GRANULOCYTES NFR BLD AUTO: 0.6 % (ref 0–0.5)
LYMPHOCYTES # BLD AUTO: 0.8 K/UL (ref 1–4.8)
LYMPHOCYTES NFR BLD: 7.4 % (ref 18–48)
MCH RBC QN AUTO: 24.1 PG (ref 27–31)
MCH RBC QN AUTO: 24.1 PG (ref 27–31)
MCHC RBC AUTO-ENTMCNC: 30.1 G/DL (ref 32–36)
MCHC RBC AUTO-ENTMCNC: 30.1 G/DL (ref 32–36)
MCV RBC AUTO: 80 FL (ref 82–98)
MCV RBC AUTO: 80 FL (ref 82–98)
MONOCYTES # BLD AUTO: 1 K/UL (ref 0.3–1)
MONOCYTES NFR BLD: 9.1 % (ref 4–15)
NEUTROPHILS # BLD AUTO: 8.9 K/UL (ref 1.8–7.7)
NEUTROPHILS NFR BLD: 82 % (ref 38–73)
NRBC BLD-RTO: 0 /100 WBC
PATH REV BLD -IMP: NORMAL
PLATELET # BLD AUTO: 72 K/UL (ref 150–450)
PLATELET # BLD AUTO: 72 K/UL (ref 150–450)
PMV BLD AUTO: 11.4 FL (ref 9.2–12.9)
PMV BLD AUTO: 11.4 FL (ref 9.2–12.9)
POCT GLUCOSE: 118 MG/DL (ref 70–110)
POCT GLUCOSE: 126 MG/DL (ref 70–110)
POCT GLUCOSE: 164 MG/DL (ref 70–110)
POCT GLUCOSE: 166 MG/DL (ref 70–110)
POTASSIUM SERPL-SCNC: 4.8 MMOL/L (ref 3.5–5.1)
PROT SERPL-MCNC: 6.8 G/DL (ref 6–8.4)
RBC # BLD AUTO: 4.69 M/UL (ref 4.6–6.2)
RBC # BLD AUTO: 4.69 M/UL (ref 4.6–6.2)
SODIUM SERPL-SCNC: 139 MMOL/L (ref 136–145)
WBC # BLD AUTO: 10.91 K/UL (ref 3.9–12.7)
WBC # BLD AUTO: 10.91 K/UL (ref 3.9–12.7)

## 2024-08-15 PROCEDURE — 20600001 HC STEP DOWN PRIVATE ROOM

## 2024-08-15 PROCEDURE — 99233 SBSQ HOSP IP/OBS HIGH 50: CPT | Mod: GC,,, | Performed by: INTERNAL MEDICINE

## 2024-08-15 PROCEDURE — 36415 COLL VENOUS BLD VENIPUNCTURE: CPT | Performed by: HOSPITALIST

## 2024-08-15 PROCEDURE — 94761 N-INVAS EAR/PLS OXIMETRY MLT: CPT

## 2024-08-15 PROCEDURE — 63600175 PHARM REV CODE 636 W HCPCS: Performed by: HOSPITALIST

## 2024-08-15 PROCEDURE — 85060 BLOOD SMEAR INTERPRETATION: CPT | Mod: ,,, | Performed by: PATHOLOGY

## 2024-08-15 PROCEDURE — 27000221 HC OXYGEN, UP TO 24 HOURS

## 2024-08-15 PROCEDURE — 86022 PLATELET ANTIBODIES: CPT | Performed by: STUDENT IN AN ORGANIZED HEALTH CARE EDUCATION/TRAINING PROGRAM

## 2024-08-15 PROCEDURE — 36415 COLL VENOUS BLD VENIPUNCTURE: CPT | Performed by: STUDENT IN AN ORGANIZED HEALTH CARE EDUCATION/TRAINING PROGRAM

## 2024-08-15 PROCEDURE — 85025 COMPLETE CBC W/AUTO DIFF WBC: CPT | Performed by: STUDENT IN AN ORGANIZED HEALTH CARE EDUCATION/TRAINING PROGRAM

## 2024-08-15 PROCEDURE — 25000003 PHARM REV CODE 250: Performed by: STUDENT IN AN ORGANIZED HEALTH CARE EDUCATION/TRAINING PROGRAM

## 2024-08-15 PROCEDURE — 25000003 PHARM REV CODE 250: Performed by: HOSPITALIST

## 2024-08-15 PROCEDURE — 80053 COMPREHEN METABOLIC PANEL: CPT | Performed by: HOSPITALIST

## 2024-08-15 PROCEDURE — 82140 ASSAY OF AMMONIA: CPT | Performed by: HOSPITALIST

## 2024-08-15 RX ORDER — BENZONATATE 100 MG/1
100 CAPSULE ORAL 3 TIMES DAILY PRN
Status: DISCONTINUED | OUTPATIENT
Start: 2024-08-16 | End: 2024-08-16 | Stop reason: HOSPADM

## 2024-08-15 RX ORDER — DULOXETIN HYDROCHLORIDE 30 MG/1
30 CAPSULE, DELAYED RELEASE ORAL DAILY
Status: DISCONTINUED | OUTPATIENT
Start: 2024-08-15 | End: 2024-08-16 | Stop reason: HOSPADM

## 2024-08-15 RX ORDER — GUAIFENESIN AND DEXTROMETHORPHAN HYDROBROMIDE 10; 100 MG/5ML; MG/5ML
10 SYRUP ORAL EVERY 6 HOURS PRN
Status: DISCONTINUED | OUTPATIENT
Start: 2024-08-16 | End: 2024-08-16 | Stop reason: HOSPADM

## 2024-08-15 RX ADMIN — RIFAXIMIN 550 MG: 550 TABLET ORAL at 08:08

## 2024-08-15 RX ADMIN — LACTULOSE 20 G: 20 SOLUTION ORAL at 08:08

## 2024-08-15 RX ADMIN — FUROSEMIDE 40 MG: 40 TABLET ORAL at 08:08

## 2024-08-15 RX ADMIN — GABAPENTIN 800 MG: 400 CAPSULE ORAL at 08:08

## 2024-08-15 RX ADMIN — SPIRONOLACTONE 100 MG: 100 TABLET ORAL at 08:08

## 2024-08-15 RX ADMIN — LACTULOSE 20 G: 20 SOLUTION ORAL at 02:08

## 2024-08-15 RX ADMIN — DULOXETINE HYDROCHLORIDE 30 MG: 30 CAPSULE, DELAYED RELEASE ORAL at 02:08

## 2024-08-15 RX ADMIN — ENOXAPARIN SODIUM 120 MG: 120 INJECTION SUBCUTANEOUS at 08:08

## 2024-08-15 RX ADMIN — APIXABAN 5 MG: 5 TABLET, FILM COATED ORAL at 08:08

## 2024-08-15 RX ADMIN — LEVOTHYROXINE SODIUM 200 MCG: 100 TABLET ORAL at 06:08

## 2024-08-15 NOTE — PROGRESS NOTES
Initial US imaging of the abdomen was performed. Small volume ascites was noted in the right upper quadrant, but a safe percutaneous window could not be identified. Paracentesis not completed.     Ros Fairbanks PA-C  Interventional Radiology  [phone]

## 2024-08-15 NOTE — PLAN OF CARE
[facility] - Telemetry Stepdown  Initial Discharge Assessment       Primary Care Provider: Willard España MD    Admission Diagnosis: Hepatic encephalopathy [K76.82]    Admission Date: 8/14/2024  Expected Discharge Date: 8/19/2024    Transition of Care Barriers: None    Payor: HU[employer] MANAGED MEDICARE / Plan: HU[employer] MEDICARE PPO / Product Type: Medicare Advantage /     Extended Emergency Contact Information  Primary Emergency Contact: SistrunkKaseyNishi  Address: [address]  Mobile Phone: [phone]  Relation: Spouse  Secondary Emergency Contact: IvonneminnieTom  Address: [address]  Mobile Phone: [phone]  Relation: None    Discharge Plan A: Home with family  Discharge Plan B: [facility] [id number] [facility] [place] - [address]  [address] [facility] [address]  [address] [facility] [address]  Phone: [phone] Fax: [phone]      Initial Assessment (most recent)       Adult Discharge Assessment - 08/15/24 1309          Discharge Assessment    Assessment Type Discharge Planning Assessment     Source of Information family     Communicated [place] with patient/caregiver Yes     Reason For Admission Hepatic encephalopothy     People in Home spouse     Facility Arrived From: [facility] from [facility]     Do you expect to return to your current living situation? Yes     Do you have help at home or someone to help you manage your care at home? Yes     Who are your caregiver(s) and their phone number(s)? Ethel Sistrunk ([phone])     Prior to hospitilization cognitive status: Alert/Oriented     Current cognitive status: Unable to Assess     Walking or Climbing Stairs Difficulty no     Dressing/Bathing Difficulty no     Equipment Currently Used at Home none     Patient currently being followed by outpatient case management? No     Do you currently have  service(s) that help you manage your care at home? No     Do you take prescription medications? Yes     Do you have prescription coverage? Yes     Do you have any problems affording any of your prescribed medications? No     Is the patient taking medications as prescribed? yes     Who is going to help you get home at discharge? Patient's family will provide transportation home.     How do you get to doctors appointments? car, drives self;family or friend will provide     Are you on dialysis? No     Do you take coumadin? No     Discharge Plan A Home with family     Discharge Plan B Home Health     DME Needed Upon Discharge  other (see comments)   TBD    Discharge Plan discussed with: Spouse/sig other     Transition of Care Barriers None                   Discharge Plan A and Plan B have been determined by review of patient's clinical status, future medical and therapeutic needs, and coverage/benefits for post-acute care in coordination with multidisciplinary team members.     Fern Waite RN  Ext [phone]

## 2024-08-15 NOTE — PLAN OF CARE
Not enough ascites noted on bedside ultrasound to safely perform paracentesis. Pt to return to inpt room via stretcher w/ transporter. Primary nurse, Get, updated via secure message.

## 2024-08-15 NOTE — SUBJECTIVE & OBJECTIVE
Interval History:     NO longer on oxygen this morning. PRN oxygen needed. Seen by Heme/Onc and discussed down trending platelets. Intermediate risk of HIT.  Will discontinue Lovenox and switch to Eliquis. Family concerned for safe ambulation. Consulted PT/OT.    Review of Systems   Constitutional:  Positive for fatigue. Negative for fever.   HENT:  Negative for congestion and trouble swallowing.    Eyes:  Negative for pain and visual disturbance.   Respiratory:  Negative for shortness of breath.    Cardiovascular:  Positive for leg swelling. Negative for chest pain.   Gastrointestinal:  Positive for abdominal distention. Negative for abdominal pain, constipation and diarrhea.   Genitourinary:  Negative for difficulty urinating and flank pain.   Skin:  Negative for rash.   Allergic/Immunologic: Negative for environmental allergies.   Neurological:  Negative for dizziness and numbness.   Psychiatric/Behavioral:  Negative for confusion and decreased concentration.      Objective:     Vital Signs (Most Recent):  Temp: 97.8 °F (36.6 °C) (08/15/24 1144)  Pulse: 88 (08/15/24 1249)  Resp: 16 (08/15/24 1249)  BP: (!) 151/92 (08/15/24 1250)  SpO2: 96 % (08/15/24 1249) Vital Signs (24h Range):  Temp:  [97.7 °F (36.5 °C)-99.5 °F (37.5 °C)] 97.8 °F (36.6 °C)  Pulse:  [] 88  Resp:  [16-19] 16  SpO2:  [95 %-98 %] 96 %  BP: (150-190)/(80-97) 151/92     Weight: 113.8 kg (250 lb 14.1 oz)  Body mass index is 34.03 kg/m².     Physical Exam  Vitals and nursing note reviewed.   Constitutional:       General: He is not in acute distress.     Appearance: He is well-developed. He is obese. He is ill-appearing. He is not diaphoretic.      Interventions: He is not intubated.  HENT:      Head: Normocephalic and atraumatic.   Eyes:      General: No scleral icterus.     Conjunctiva/sclera: Conjunctivae normal.   Cardiovascular:      Rate and Rhythm: Normal rate and regular rhythm.      Heart sounds: Normal heart sounds. No murmur  heard.  Pulmonary:      Effort: Pulmonary effort is normal. No accessory muscle usage or respiratory distress. He is not intubated.   Abdominal:      General: There is distension.      Palpations: Abdomen is soft.      Tenderness: There is no abdominal tenderness.   Musculoskeletal:      Right lower leg: Edema present.      Left lower leg: Edema present.   Skin:     General: Skin is warm and dry.      Coloration: Skin is not jaundiced or pale.   Neurological:      Mental Status: He is oriented to person, place, and time. Mental status is at baseline.      Motor: No seizure activity.   Psychiatric:         Mood and Affect: Mood normal.         Speech: Speech is delayed.         Behavior: Behavior is not aggressive or combative.         Cognition and Memory: Cognition is not impaired.                Significant Labs: All pertinent labs within the past 24 hours have been reviewed.    Recent Labs   Lab 08/15/24  1034   WBC 10.91  10.91   RBC 4.69  4.69   HGB 11.3*  11.3*   HCT 37.6*  37.6*   PLT 72*  72*   MCV 80*  80*   MCH 24.1*  24.1*   MCHC 30.1*  30.1*      Recent Labs   Lab 08/15/24  0540   CALCIUM 9.3   ALBUMIN 2.6*   PROT 6.8      K 4.8   CO2 25      BUN 39*   CREATININE 1.3   ALKPHOS 322*   *   *   BILITOT 1.1*       Significant Imaging: I have reviewed all pertinent imaging results/findings within the past 24 hours.

## 2024-08-15 NOTE — PLAN OF CARE
Problem: Adult Inpatient Plan of Care  Goal: Plan of Care Review  Outcome: Progressing  Goal: Patient-Specific Goal (Individualized)  Outcome: Progressing  Goal: Absence of Hospital-Acquired Illness or Injury  Outcome: Progressing  Goal: Optimal Comfort and Wellbeing  Outcome: Progressing  Goal: Readiness for Transition of Care  Outcome: Progressing     Problem: Diabetes Comorbidity  Goal: Blood Glucose Level Within Targeted Range  Outcome: Progressing     Problem: Acute Kidney Injury/Impairment  Goal: Fluid and Electrolyte Balance  Outcome: Progressing  Goal: Improved Oral Intake  Outcome: Progressing  Goal: Effective Renal Function  Outcome: Progressing     Problem: Skin Injury Risk Increased  Goal: Skin Health and Integrity  Outcome: Progressing     Problem: Infection  Goal: Absence of Infection Signs and Symptoms  Outcome: Progressing   Patient in bed. No complaints voiced. NADN at this time. Safety measures in place. Call light in reach. Family at the bedside.

## 2024-08-15 NOTE — PROGRESS NOTES
Attempted to obtain consent for paracentesis.   LVM.    Ros Fairbanks PA-C  Interventional Radiology  [phone]

## 2024-08-15 NOTE — PROGRESS NOTES
[facility] - Telemetry Stepdown  Hematology/Oncology  Progress Note      Hepatocellular carcinoma  Liver failure  Thrombocytopenia  Patient with newly diagnosed HCC (based on CT on 8/6/24, no biopsy) who is currently admitted with decompensated liver failure. He intially presented with AMS to [facility]. Workup revealed an ammonia of 116 and INR of 1.7, TB of 1.7, Platelets of 79k. Patient was recently admitted to [facility] and was discharged with follow-up with oncology on 8/19 where he was not coagulopathic and his platelets were normal.     - On lactulose and rifaximin for elevated ammonia, mental status almost at baseline   - Transplant meeting on 8/13 recommended systemic therapy with possible LRT by IR   - No indication for inpatient treatment   - CT CAP on 8/6 confirmed diagnoses of HCC, there were also bilateral pulmonary nodules up to 0.8cm concerning for mets   - Patient has follow-up with oncology on 8/19  - Recommend continuing lactulose while inpatient and on discharge for goal 3-4 BM's per day   - On lasix and spironolactone for ascites  - Platelets noted to be 79k on 8/13 in the setting of heparin exposure on 8/3. Platelets still around 72k today without any new thromboses or skin necrosis despite lovenox exposure yesterday. 4T score is low to intermediate and HIT Ab has been sent  -it is very likely his thrombocytopenia is secondary to liver failure given his new coagulopathy, hepatic encephalopathy, and ascites  -regardless of his likelihood of HIT, he needs treatment dose anticoagulation. Recommend a non-heparin anticoagulant such as argatroban or even a DOAC        Final recs pending staff attestation. Let us know if any questions    Ramu Barillas MD  Hematology/Oncology  [facility] - Telemetry Stepdown

## 2024-08-15 NOTE — PLAN OF CARE
Pt arrived to [facility] 3 via stretcher w/ transporter. AAOx3. Name//allergies/procedure verified. Pt will be monitored by Rn throughout procedure.

## 2024-08-16 VITALS
DIASTOLIC BLOOD PRESSURE: 95 MMHG | TEMPERATURE: 97 F | BODY MASS INDEX: 34.03 KG/M2 | RESPIRATION RATE: 18 BRPM | HEART RATE: 87 BPM | SYSTOLIC BLOOD PRESSURE: 174 MMHG | OXYGEN SATURATION: 99 % | WEIGHT: 250.88 LBS

## 2024-08-16 LAB
ALBUMIN SERPL BCP-MCNC: 2.6 G/DL (ref 3.5–5.2)
ALP SERPL-CCNC: 330 U/L (ref 55–135)
ALT SERPL W/O P-5'-P-CCNC: 308 U/L (ref 10–44)
ANION GAP SERPL CALC-SCNC: 11 MMOL/L (ref 8–16)
AST SERPL-CCNC: 394 U/L (ref 10–40)
BILIRUB SERPL-MCNC: 1.1 MG/DL (ref 0.1–1)
BUN SERPL-MCNC: 36 MG/DL (ref 8–23)
CALCIUM SERPL-MCNC: 9 MG/DL (ref 8.7–10.5)
CHLORIDE SERPL-SCNC: 103 MMOL/L (ref 95–110)
CO2 SERPL-SCNC: 23 MMOL/L (ref 23–29)
CREAT SERPL-MCNC: 1.3 MG/DL (ref 0.5–1.4)
ERYTHROCYTE [DISTWIDTH] IN BLOOD BY AUTOMATED COUNT: 15.3 % (ref 11.5–14.5)
EST. GFR  (NO RACE VARIABLE): 58 ML/MIN/1.73 M^2
GLUCOSE SERPL-MCNC: 99 MG/DL (ref 70–110)
HCT VFR BLD AUTO: 35.7 % (ref 40–54)
HGB BLD-MCNC: 11.1 G/DL (ref 14–18)
MCH RBC QN AUTO: 24.7 PG (ref 27–31)
MCHC RBC AUTO-ENTMCNC: 31.1 G/DL (ref 32–36)
MCV RBC AUTO: 80 FL (ref 82–98)
PATH REV BLD -IMP: NORMAL
PF4 HEPARIN CMPLX AB SER QL: NORMAL OD (ref 0–0.4)
PLATELET # BLD AUTO: 63 K/UL (ref 150–450)
PMV BLD AUTO: 11.4 FL (ref 9.2–12.9)
POCT GLUCOSE: 132 MG/DL (ref 70–110)
POCT GLUCOSE: 148 MG/DL (ref 70–110)
POCT GLUCOSE: 151 MG/DL (ref 70–110)
POTASSIUM SERPL-SCNC: 4.2 MMOL/L (ref 3.5–5.1)
PROT SERPL-MCNC: 6.9 G/DL (ref 6–8.4)
RBC # BLD AUTO: 4.49 M/UL (ref 4.6–6.2)
SODIUM SERPL-SCNC: 137 MMOL/L (ref 136–145)
WBC # BLD AUTO: 9.82 K/UL (ref 3.9–12.7)

## 2024-08-16 PROCEDURE — 80053 COMPREHEN METABOLIC PANEL: CPT | Performed by: HOSPITALIST

## 2024-08-16 PROCEDURE — 25000003 PHARM REV CODE 250: Performed by: HOSPITALIST

## 2024-08-16 PROCEDURE — 36415 COLL VENOUS BLD VENIPUNCTURE: CPT | Performed by: HOSPITALIST

## 2024-08-16 PROCEDURE — 97116 GAIT TRAINING THERAPY: CPT

## 2024-08-16 PROCEDURE — 25000003 PHARM REV CODE 250: Performed by: STUDENT IN AN ORGANIZED HEALTH CARE EDUCATION/TRAINING PROGRAM

## 2024-08-16 PROCEDURE — 85027 COMPLETE CBC AUTOMATED: CPT | Performed by: STUDENT IN AN ORGANIZED HEALTH CARE EDUCATION/TRAINING PROGRAM

## 2024-08-16 PROCEDURE — 97161 PT EVAL LOW COMPLEX 20 MIN: CPT

## 2024-08-16 PROCEDURE — 97165 OT EVAL LOW COMPLEX 30 MIN: CPT

## 2024-08-16 RX ORDER — LACTULOSE 10 G/15ML
20 SOLUTION ORAL; RECTAL 3 TIMES DAILY
Qty: 2700 ML | Refills: 2 | Status: SHIPPED | OUTPATIENT
Start: 2024-08-16 | End: 2024-11-14

## 2024-08-16 RX ADMIN — SPIRONOLACTONE 100 MG: 100 TABLET ORAL at 09:08

## 2024-08-16 RX ADMIN — GABAPENTIN 800 MG: 400 CAPSULE ORAL at 09:08

## 2024-08-16 RX ADMIN — RIFAXIMIN 550 MG: 550 TABLET ORAL at 09:08

## 2024-08-16 RX ADMIN — LACTULOSE 20 G: 20 SOLUTION ORAL at 04:08

## 2024-08-16 RX ADMIN — FUROSEMIDE 40 MG: 40 TABLET ORAL at 09:08

## 2024-08-16 RX ADMIN — APIXABAN 5 MG: 5 TABLET, FILM COATED ORAL at 09:08

## 2024-08-16 RX ADMIN — LACTULOSE 20 G: 20 SOLUTION ORAL at 09:08

## 2024-08-16 RX ADMIN — HYDROCODONE BITARTRATE AND ACETAMINOPHEN 1 TABLET: 10; 325 TABLET ORAL at 12:08

## 2024-08-16 RX ADMIN — DULOXETINE HYDROCHLORIDE 30 MG: 30 CAPSULE, DELAYED RELEASE ORAL at 09:08

## 2024-08-16 RX ADMIN — LEVOTHYROXINE SODIUM 200 MCG: 100 TABLET ORAL at 06:08

## 2024-08-16 NOTE — PLAN OF CARE
Problem: Occupational Therapy  Goal: Occupational Therapy Goal  Description: Goals to be met by: 09/15/24     Patient will increase functional independence with ADLs by performing:    UE Dressing with Supervision.  LE Dressing with Moderate Assistance with donning shorts.  Grooming while standing with Supervision.  Toileting from bedside commode with Minimal Assistance for hygiene and clothing management.   Supine to sit with Supervision.  Step transfer with Supervision  Toilet transfer to toilet with Supervision.    Outcome: Progressing     OT eval completed.    DME Justification      Rolling Walker - Patient demonstrates a mobility limitation that significantly impairs their ability to participate in one or more mobility related activities of daily living. Patient's mobility limitation cannot be sufficiently resolved with the use of a cane, but can be sufficiently resolved with the use of a rolling walker.The use of a rolling walker will considerably improve their ability to participate in MRADLs. Patient will use the walker on a regular basis at home.

## 2024-08-16 NOTE — PLAN OF CARE
Problem: Adult Inpatient Plan of Care  Goal: Plan of Care Review  Outcome: Progressing  Goal: Patient-Specific Goal (Individualized)  Outcome: Progressing  Goal: Absence of Hospital-Acquired Illness or Injury  Outcome: Progressing  Goal: Optimal Comfort and Wellbeing  Outcome: Progressing  Goal: Readiness for Transition of Care  Outcome: Progressing     Problem: Diabetes Comorbidity  Goal: Blood Glucose Level Within Targeted Range  Outcome: Progressing     Problem: Acute Kidney Injury/Impairment  Goal: Fluid and Electrolyte Balance  Outcome: Progressing  Goal: Improved Oral Intake  Outcome: Progressing  Goal: Effective Renal Function  Outcome: Progressing     Problem: Skin Injury Risk Increased  Goal: Skin Health and Integrity  Outcome: Progressing     Problem: Infection  Goal: Absence of Infection Signs and Symptoms  Outcome: Progressing  Patient in bed. No complaints voiced. NADN at this time. Safety measures in place. Call light in reach. Family remains at the bedside.

## 2024-08-16 NOTE — PLAN OF CARE
Problem: Physical Therapy  Goal: Physical Therapy Goal  Description: Goals to be met by:      Patient will increase functional independence with mobility by performin. Supine to sit with Modified Emanuel  2. Sit to supine with Modified Emanuel  3. Sit to stand transfer with Modified Emanuel  4. Gait  x 400 feet with Modified Emanuel using LRAD.   5. Ascend/descend 4 stair with bilateral Handrails Supervision using LRAD.    DME Justification  Patient demonstrates a mobility limitation that significantly impairs their ability to participate in one or more mobility related activities of daily living. Patient's mobility limitation cannot be sufficiently resolved with the use of a cane, but can be sufficiently resolved with the use of a rolling walker.The use of a rolling walker will considerably improve their ability to participate in MRADLs. Patient will use the walker on a regular basis at home.        Outcome: Progressing   Evaluation completed, initiated plan of care.   Nubia Salinas, PT  2024

## 2024-08-16 NOTE — ASSESSMENT & PLAN NOTE
Check UA, urine creatinine, urine sodium, urine urea, urine protein/creatinine ratio, urine eosinophils, retroperitoneal ultrasound. Monitor labs.    ELAINE is likely due to pre-renal azotemia due to intravascular volume depletion. Baseline creatinine is  1 . Most recent creatinine and eGFR are listed below.  Recent Labs     08/14/24  1309 08/15/24  0540   CREATININE 1.4 1.3   EGFRNORACEVR 53.1* 58.0*      Plan  - ELAINE is stable  - Avoid nephrotoxins and renally dose meds for GFR listed above  - Monitor urine output, serial BMP, and adjust therapy as needed      RP US reported Mildly elevated bilateral arterial resistive indices which is nonspecific but may be seen with medical renal disease. No hydronephrosis.  Bilateral renal cysts. Right upper quadrant ascites.

## 2024-08-16 NOTE — PT/OT/SLP EVAL
"Physical Therapy Evaluation    Patient Name:  Howard G Sistrunk   MRN:  [id number]    Recommendations:     Discharge Recommendations: Low Intensity Therapy   Discharge Equipment Recommendations: walker, rolling   Barriers to discharge: None    The patient is safe and appropriate to mobilize with RN staff outside of therapy sessions: The patient is safe to ambulate with RN assist x1 person.       Assessment:     Howard G Sistrunk is a 74 y.o. male admitted with a medical diagnosis of Hepatic encephalopathy.  He presents with the following impairments/functional limitations: weakness, impaired endurance, impaired self care skills, impaired functional mobility, gait instability, impaired balance, edema, decreased lower extremity function, decreased upper extremity function, decreased coordination. The patient was found standing in bathroom, wife at bedside. Patient reports significant improvement in strength and mobility, but still with LE edema and gait instability, decreased activity tolerance. He was able to stand with stand by assistance, ambulated 120' with RW and stand by assistance. Prior to admission, he was independent with mobility. Patient currently demonstrates a need for low intensity therapy on a scheduled basis secondary to a decline in functional status due to illness     Rehab Prognosis: Good; patient would benefit from acute skilled PT services to address these deficits and reach maximum level of function.    Recent Surgery: * No surgery found *      Plan:     During this hospitalization, patient to be seen 3 x/week to address the identified rehab impairments via gait training, therapeutic activities, therapeutic exercises, neuromuscular re-education and progress toward the following goals:    Plan of Care Expires:  09/15/24    Subjective     Chief Complaint: per wife "He could barely walk when he first came in, he's doing much better now"  Patient/Family Comments/goals: return to " PLOF  Pain/Comfort:  Pain Rating 1: 0/10    Patients cultural, spiritual, Confucianism conflicts given the current situation: no    Living Environment:  The patient lives in a double wide mobile home with his wife, 4 ALEE with WILIAM HR, WIS with built in seat. 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 Enjoys gardening and spending time with grandchildren.   Prior to admission, patients level of function was independent, had recently purchased SPC but did not use.  Equipment used at home: cane, straight (WIS with built in seat).  DME owned (not currently used): single point cane.  Upon discharge, patient will have assistance from wife.    Objective:     Communicated with RN prior to session.  Patient found ambulatory in room/nova with peripheral IV  upon PT entry to room.Wife at bedside.     General Precautions: Standard, fall  Orthopedic Precautions:N/A   Braces: N/A  Respiratory Status: Room air    Exams:    Cognitive Exam  Patient is A&O x4 and follows 100% of one -step commands    Fine Motor Coordination   -       WNL     Postural Exam Patient presented with the following abnormalities:    -       Rounded shoulders  -       Forward head  -       Kyphosis  -       Posterior pelvic tilt  -   Sensation    -       Light touch intact WILIAM LE, slightly diminished, chronic neuropathy   Skin Integrity/Edema     -       Skin integrity: visibly intact  -       Edema: pitting edema WILIAM lower legs and feet   R LE ROM WNL   R LE Strength 3+/5 hip flexion, 4/5 knee ext/flex, and ankle DF/PF   L LE ROM WNL   L LE Strength  3+/5 hip flexion, 4/5 knee ext/flex, and ankle DF/PF       Functional Mobility:    Bed Mobility  Deferred, ambulatory in room and up in chair at end of session   Transfers Sit to Stand:  stand by assistance from bedside chair   Gait  Gait Distance: 120 ft with RW  Assistance Level: stand by assistance   Description: kyphotic posture, slow deliberate steps, decreased step length, decreased foot clearance, step  to with R LE leading   Stairs Deferred, fatigue          AM-PAC 6 CLICK MOBILITY  Total Score:22       Treatment & Education:  Patient and wife educated on:  -role of therapy  -goals of session  -PT POC  -benefits of out of bed mobility and consequences of immobility  -calling for staff assist to mobilize safely  Patient agreeable to mobilize with therapy.      Educated on RW mechanics and technique, demo'd transfers with RW.     Gait training: cued for upright posture, reciprocal strides, cued to ambulate inside RW PAULETTE, pacing for energy conservation     Patient encouraged to ambulate, sit up in chair 3x/day to prevent deconditioning during hospitalization. Patient verbalized understanding and agreement to mobilize only with RN assist for safety.     Patient left up in chair with all lines intact and call button in reach.    GOALS:   Multidisciplinary Problems       Physical Therapy Goals          Problem: Physical Therapy    Goal Priority Disciplines Outcome Goal Variances Interventions   Physical Therapy Goal     PT, PT/OT Progressing     Description: Goals to be met by:      Patient will increase functional independence with mobility by performin. Supine to sit with Modified Weston  2. Sit to supine with Modified Weston  3. Sit to stand transfer with Modified Weston  4. Gait  x 400 feet with Modified Weston using LRAD.   5. Ascend/descend 4 stair with bilateral Handrails Supervision using LRAD.    DME Justification  Patient demonstrates a mobility limitation that significantly impairs their ability to participate in one or more mobility related activities of daily living. Patient's mobility limitation cannot be sufficiently resolved with the use of a cane, but can be sufficiently resolved with the use of a rolling walker.The use of a rolling walker will considerably improve their ability to participate in MRADLs. Patient will use the walker on a regular basis at home.                              History:     Past Medical History:   Diagnosis Date    Atrial thrombosis 08/03/2024    Diabetes mellitus, type 2     Essential hypertension     HCC (hepatocellular carcinoma)     Hepatic encephalopathy 08/13/2024    Obesity     Portal vein thrombosis 08/03/2024    Pulmonary embolism 01/26/2024       Past Surgical History:   Procedure Laterality Date    ESOPHAGOGASTRODUODENOSCOPY N/A 8/6/2024    Procedure: EGD (ESOPHAGOGASTRODUODENOSCOPY);  Surgeon: Alexander Bernstein MD;  Location: [address]);  Service: Endoscopy;  Laterality: N/A;       Time Tracking:     PT Received On: 08/16/24  PT Start Time: 0915     PT Stop Time: 0938  PT Total Time (min): 23 min     Billable Minutes: Evaluation 10 and Gait Training 13      08/16/2024

## 2024-08-16 NOTE — ASSESSMENT & PLAN NOTE
Anticoagulated on enoxaparin.  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 Will switch Lovenox to Eliquis.

## 2024-08-16 NOTE — ASSESSMENT & PLAN NOTE
Small volume seen on recent imaging but abdomen seems distended.   IR could not find safe pocket for paracentesis on 8/15

## 2024-08-16 NOTE — PT/OT/SLP EVAL
Occupational Therapy   Evaluation    Name: Howard G Sistrunk  MRN: [id number]  Admitting Diagnosis: Hepatic encephalopathy  Recent Surgery: * No surgery found *      Recommendations:     Discharge Recommendations: Low Intensity Therapy  Discharge Equipment Recommendations:  walker, rolling  Barriers to discharge:  None    Assessment:     Howard G Sistrunk is a 74 y.o. male with a medical diagnosis of Hepatic encephalopathy.  He presents with decreased independence with ADL's. Performance deficits affecting function: weakness, impaired endurance, impaired self care skills, impaired functional mobility, impaired balance, decreased safety awareness, decreased lower extremity function, decreased upper extremity function, impaired cognition.      Rehab Prognosis: Fair; patient would benefit from acute skilled OT services to address these deficits and reach maximum level of function.       Plan:     Patient to be seen 3 x/week to address the above listed problems via self-care/home management, therapeutic activities, therapeutic exercises  Plan of Care Expires: 09/15/24  Plan of Care Reviewed with: patient, spouse    Subjective     Chief Complaint: none  Patient/Family Comments/goals: Pt's spouse reported that pt's hearing aids are at home.    Occupational Profile:  Living Environment: Pt resides with spouse in 1 story house with 4-5 steps (B) rails to enter.  Pt was independent with ADL's & ambulation PTA (does not wear socks).  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 Pt enjoys fishing & golfing.  Equipment Used at Home: cane, straight (built-in shower bench)  Assistance upon Discharge: spouse    Pain/Comfort:  Pain Rating 1: 0/10  Pain Rating Post-Intervention 1: 0/10    Patients cultural, spiritual, Baptism conflicts given the current situation: no    Objective:     Communicated with: RN prior to session.  Patient found up in chair with  (no active lines; spouse present during session)  upon OT entry to room.    General Precautions: Standard, fall  Orthopedic Precautions: N/A  Braces: N/A  Respiratory Status: Room air    Occupational Performance:    Functional Mobility/Transfers:  Patient completed Sit <> Stand Transfer with stand by assistance  with  no assistive device   Functional Mobility: SBA with functional mobility in room    Activities of Daily Living:  Upper Body Dressing: stand by assistance donning gown around back while standing  Lower Body Dressing: total assistance donning socks seated in chair    Cognitive/Visual Perceptual:  Cognitive/Psychosocial Skills:     -       Oriented to: Person, Place, Time, and Situation   -       Follows Commands/attention:Follows one-step commands  -       Delayed responses & slowed movements    Physical Exam:  Sensation:    -       Intact  Dominant hand:    -       left  Upper Extremity Range of Motion:  BUE WFL  Upper Extremity Strength: BUE WFL   Strength: BUE WFL    AMPAC 6 Click ADL:  AMPAC Total Score: 15    Treatment & Education:  Provided education on safety. Provided education regarding role of OT, POC, & discharge recommendations with pt & spouse verbalizing understanding.  Pt had no further questions & when asked whether there were any concerns pt reported none.      Patient left up in chair with all lines intact, call button in reach, RN notified, and spouse present    GOALS:   Multidisciplinary Problems       Occupational Therapy Goals          Problem: Occupational Therapy    Goal Priority Disciplines Outcome Interventions   Occupational Therapy Goal     OT, PT/OT Progressing    Description: Goals to be met by: 09/15/24     Patient will increase functional independence with ADLs by performing:    UE Dressing with Supervision.  LE Dressing with Moderate Assistance with donning shorts.  Grooming while standing with Supervision.  Toileting from bedside commode with Minimal Assistance for hygiene and clothing management.   Supine to sit  with Supervision.  Step transfer with Supervision  Toilet transfer to toilet with Supervision.                         History:     Past Medical History:   Diagnosis Date    Atrial thrombosis 08/03/2024    Diabetes mellitus, type 2     Essential hypertension     HCC (hepatocellular carcinoma)     Hepatic encephalopathy 08/13/2024    Obesity     Portal vein thrombosis 08/03/2024    Pulmonary embolism 01/26/2024         Past Surgical History:   Procedure Laterality Date    ESOPHAGOGASTRODUODENOSCOPY N/A 8/6/2024    Procedure: EGD (ESOPHAGOGASTRODUODENOSCOPY);  Surgeon: Alexander Bernstein MD;  Location: [address];  Service: Endoscopy;  Laterality: N/A;       Time Tracking:     OT Date of Treatment: 08/16/24  OT Start Time: 1038  OT Stop Time: 1049  OT Total Time (min): 11 min    Billable Minutes:Evaluation 11    8/16/2024

## 2024-08-16 NOTE — NURSING
Home Oxygen Evaluation    Date Performed: 2024    1) Patient's Home O2 Sat on room air, while at rest: 97%        If O2 sats on room air at rest are 88% or below, patient qualifies. No additional testing needed. Document N/A in steps 2 and 3. If 89% or above, complete steps 2.      2) Patient's O2 Sat on room air while exercisin%        If O2 sats on room air while exercising remain 89% or above patient does not qualify, no further testing needed Document N/A in step 3. If O2 sats on room air while exercising are 88% or below, continue to step 3.      3) Patient's O2 Sat while exercising on O2: 95% at 3 LPM         (Must show improvement from #2 for patients to qualify)    If O2 sats improve on oxygen, patient qualifies for portable oxygen. If not, the patient does not qualify.

## 2024-08-16 NOTE — PLAN OF CARE
[facility] - Telemetry Stepdown      HOME HEALTH ORDERS  FACE TO FACE ENCOUNTER    Patient Name: Howard G Sistrunk  YOB: 1950    PCP: Willard España MD   PCP Address: [address] [place] [address] [place] [address]  PCP Phone Number: [phone]  PCP Fax: [phone]    Encounter Date: 8/13/24    Admit to Home Health    Diagnoses:  Active Hospital Problems    Diagnosis  POA    *Hepatic encephalopathy [K76.82]  Yes    ELAINE (acute kidney injury) [N17.9]  Yes    Ascites [R18.8]  Yes    Thrombocytopenia [D69.6]  Yes    Diabetes mellitus, type 2 [E11.9]  Yes     Chronic    Essential hypertension [I10]  Yes     Chronic    Hepatocellular carcinoma [C22.0]  Yes     Chronic    Atrial thrombosis [I51.3]  Yes     Chronic    PVT (portal vein thrombosis) [I81]  Yes     Chronic    History of pulmonary embolism [Z86.711]  Yes     Chronic    Hypothyroidism [E03.9]  Yes     Chronic      Resolved Hospital Problems   No resolved problems to display.       Follow Up Appointments:  Future Appointments   Date Time Provider Department Center   8/19/2024  3:00 PM Anibal Mcwilliams MD [facility] [id number] [facility]   9/26/2024  8:30 AM LAB, APPOINTMENT [facility] LAB P [facility]   9/26/2024  9:30 AM Adelaide Garcia MD [facility] HEPAT [facility]       Allergies:Review of patient's allergies indicates:  No Known Allergies    Medications: Review discharge medications with patient and family and provide education.      I have seen and examined this patient within the last 30 days. My clinical findings that support the need for the home health skilled services and home bound status are the following:no   Weakness/numbness causing balance and gait disturbance due to Liver Disease, Weakness/Debility, and Malignancy/Cancer making it taxing to leave home.  Requiring assistive device to leave home due to unsteady gait caused by  Liver Disease, Weakness/Debility, Anemia, and Malignancy/Cancer.  Patient with medication mismanagement  issues requiring home bound status as evidenced by  Poor understanding of medication regimen/dosage and Poor adherence to medication regimen/dosage.  Medical restrictions requiring assistance of another human to leave home due to  Dyspnea on exertion (SOB), Unstable ambulation, and Home oxygen requirement.     Diet:   cardiac diet, fluid restriction, and 2 gram sodium diet    Referrals/ Consults  Physical Therapy to evaluate and treat. Evaluate for home safety and equipment needs; Establish/upgrade home exercise program. Perform / instruct on therapeutic exercises, gait training, transfer training, and Range of Motion.  Occupational Therapy to evaluate and treat. Evaluate home environment for safety and equipment needs. Perform/Instruct on transfers, ADL training, ROM, and therapeutic exercises.    Activities:   activity as tolerated    Nursing:   Agency to admit patient within 24 hours of hospital discharge unless specified on physician order or at patient request    SN to complete comprehensive assessment including routine vital signs. Instruct on disease process and s/s of complications to report to MD. Review/verify medication list sent home with the patient at time of discharge  and instruct patient/caregiver as needed. Frequency may be adjusted depending on start of care date.     Skilled nurse to perform up to 3 visits PRN for symptoms related to diagnosis    Notify MD if SBP > 160 or < 90; DBP > 90 or < 50; HR > 120 or < 50; Temp > 101; O2 < 88%; Other:       Ok to schedule additional visits based on staff availability and patient request on consecutive days within the home health episode.    Miscellaneous   Home Oxygen:  Oxygen at 3 L/min nasal canula to be used:  As needed for SOB.    Home Health Aide:  Physical Therapy Three times weekly, Occupational Therapy Three times weekly, and Home Health Aide Three times weekly    I certify that this patient is confined to his home and needs intermittent skilled  nursing care, physical therapy, and occupational therapy.

## 2024-08-16 NOTE — ASSESSMENT & PLAN NOTE
The likely etiology of thrombocytopenia is medication induced, the suspected medication(s) is/are Lovenox/Heparin . The patients 3 most recent labs are listed below.  Recent Labs     08/14/24  1309 08/15/24  1034   PLT 64* 72*  72*     Plan    - Platelets noted to be 79k on 8/13 in the setting of heparin exposure on 8/3. No new thromboses or skin necrosis despite lovenox exposure yesterday. 4T score is low to intermediate and HIT Ab has been sent  - Switched to a non-heparin anticoagulant such as argatroban or even a DOAC

## 2024-08-16 NOTE — PLAN OF CARE
[facility] - Telemetry Stepdown  Discharge Final Note    Primary Care Provider: Willard España MD    Expected Discharge Date: 8/16/2024    Future Appointments   Date Time Provider Department Center   8/19/2024  3:00 PM Anibal Mcwilliams MD [facility] HEMON[facility]   9/26/2024  8:30 AM LAB, APPOINTMENT [place] [facility]   9/26/2024  9:30 AM Adelaide Garcia MD [facility]          Final Discharge Note (most recent)       Final Note - 08/16/24 1350          Final Note    Anticipated Discharge Disposition Home-Health Care Svc     What phone number can be called within the next 1-3 days to see how you are doing after discharge? [phone]     Hospital Resources/Appts/Education Provided Appointments scheduled and added to AVS        Post-Acute Status    Post-Acute Authorization Home Health     Home Health Status Set-up Complete/Auth obtained   [facility]                    Important Message from Medicare  Important Message from Medicare regarding Discharge Appeal Rights: Given to patient/caregiver, Explained to patient/caregiver, Signed/date by patient/caregiver     Date IMM was signed: 08/16/24  Time IMM was signed: 1051    Contact Info       Willard España MD   Specialty: Internal Medicine   Relationship: PCP - General    [facility] [address] [place] [address]   Phone: [phone]       Next Steps: Follow up on 8/21/2024    Instructions: Established pt's hospital f/u visit @ 10:30am. Please bring discharge summary, ID, insurance card, and medication list.          Fern Waite RN  Ext [phone]

## 2024-08-16 NOTE — PLAN OF CARE
Sent referral to [facility] [phone] ([phone] for Home O2.  3:30 PM  [facility] not in network with this plan. [facility] [phone] is the provider. Referral faxed to [facility]  4:11 PM  Talked with Joelle [phone] at [facility] states should be delivering O2 to the patient keith [place], [facility]    [facility]  [phone]

## 2024-08-16 NOTE — PLAN OF CARE
08/16/24 1225   Post-Acute Status   Post-Acute Authorization Home Health   Home Health Status Referrals Sent     Referral sent to [facility] for patient to resume services.    Fern Waite RN  Ext [phone]

## 2024-08-16 NOTE — PLAN OF CARE
5:27 PM  O2 for home delivered by [employer] to bedside.     Selam Owens LCSW  Case Management/[facility]  [phone]  '

## 2024-08-16 NOTE — HOSPITAL COURSE
"Howard Sistrunk is a 74M with fatty liver, recent pulmonary embolism on anticoagulation, recent diagnosis of HCC (based on CT on 8/6/24, no biopsy), diabetes and neuropathy.     He was admitted due to encephalopathy from OSH. He presented with ammonia of 116 and INR of 1.7, TB of 1.7, Platelets of 79k. We treated him with rifaximin and lactulose and his mentation improved.     He was transferred to [facility] for evaluation by Heme-Onc. He was recently discussed at transplant meeting on 8/13  noting "Very large lesion, 14 cm, with HV tumor thrombus involvement, extending up, at least to the inferior cava-atrial junction; not actually in the right atrium. Looks like portal involvement, as well. There are a couple of satellite lesions (1.9 cm)". Committee recommended systemic therapy with possible LRT by IR.  He was evaluated by oncology service and no inpatient treatment recommended and was and was suggested to follow up on upcoming appointment outpatient on 8/19.     His encephalopathy improved with lactulose and rifaximin. Neither were home medications. Arranged to discharge on lactulose and discuss rifaximin with pharmacy for approval.     Was also given lasix and spironolactone for ascites    Platelets noted to be 79k on 8/13 in the setting of heparin exposure on 8/3. 4T score is low to intermediate and HIT Ab sent. Thought by Heme-Onc unlikely to be HIT however recommended non-heparin anticoagulation with argatroban vs DOAC. Eliquis was restarted.     Curbside discussion with hepatology and recommendation to continue Onc follow up and preponing 9/26 outpatient appointment with Hepatology.     He had transiently required oxygen which improved after lasix and spironolactone. Paracentesis attempted however no safe pocket could be identified. Walk test was completed and saturation dropped on exertion and hence oxygen arranged for 3L at exertion. Also assessed by PT and recommendation for HHPT with walker. HH " arranged.      Review of Systems   Constitutional:  Positive for fatigue. Negative for fever.   Respiratory:  Negative for shortness of breath.    Cardiovascular:  Positive for leg swelling. Negative for chest pain.   Gastrointestinal:  Positive for abdominal distention. Negative for abdominal pain, constipation and diarrhea.   Genitourinary:  Negative for difficulty urinating and flank pain.   Neurological:  Negative for dizziness and numbness.   Psychiatric/Behavioral:  Negative for confusion and decreased concentration.      Objective:     Vital Signs (Most Recent):  Temp: 97.2 °F (36.2 °C) (08/16/24 1617)  Pulse: 87 (08/16/24 1617)  Resp: 18 (08/16/24 1617)  BP: (!) 174/95 (08/16/24 1617)  SpO2: 99 % (08/16/24 1617) Vital Signs (24h Range):  Temp:  [98.1 °F (36.7 °C)-98.3 °F (36.8 °C)] 98.3 °F (36.8 °C)  Pulse:  [] 111  Resp:  [17-18] 18  SpO2:  [95 %-98 %] 98 %  BP: (137-177)/(76-92) 142/92     Weight: 113.8 kg (250 lb 14.1 oz)  Body mass index is 34.03 kg/m².     Physical Exam  Vitals and nursing note reviewed.   Constitutional:       General: He is not in acute distress.     Appearance: He is well-developed. He is obese. He is ill-appearing. He is not diaphoretic.   Cardiovascular:      Rate and Rhythm: Normal rate and regular rhythm.      Heart sounds: Normal heart sounds. No murmur heard.  Pulmonary:      Effort: Pulmonary effort is normal. No accessory muscle usage or respiratory distress. He is not intubated.   Abdominal:      General: There is distension.      Palpations: Abdomen is soft.      Tenderness: There is no abdominal tenderness.   Musculoskeletal:      Right lower leg: Edema present.      Left lower leg: Edema present.   Skin:     General: Skin is warm and dry.      Coloration: Skin is not jaundiced or pale.   Neurological:      Mental Status: He is oriented to person, place, and time. Mental status is at baseline.      Motor: No seizure activity.   Psychiatric:         Mood and Affect:  Mood normal.         Behavior: Behavior is not aggressive or combative.         Cognition and Memory: Cognition is not impaired.

## 2024-08-16 NOTE — NURSING
Pt transported home by personal car at 1750. Iv access removed, DC instructions reviewed, home O2 delivered to bedside and bedside pharmacy delivered dc medications. Assisted pt to personal car, no signs of distress while assisting.

## 2024-08-16 NOTE — PLAN OF CARE
Hematology Plan of Care    Contacted yesterday regarding concern for heparin induced thrombocytopenia, HIT Ab now negative. His thrombocytopenia is likely secondary to liver failure given his new coagulopathy, hepatic encephalopathy, and ascites. Would discharge patient on a DOAC. He will follow up with Dr. Mcwilliams on 8/19 for management of his HCC.     We will sign off. Let us know if any questions.     Ramu Barillas MD  Hematology/Oncology PGY-V

## 2024-08-16 NOTE — ASSESSMENT & PLAN NOTE
Transferred here for Hematology-Oncology evaluation     -newly diagnosed HCC (based on CT on 8/6/24, no biopsy)  -He was recommended for follow-up with oncology on 8/19   - Transplant meeting on 8/13 recommended systemic therapy with possible LRT by IR   - No indication for inpatient treatment     - CT CAP on 8/6 confirmed diagnoses of HCC, there were also bilateral pulmonary nodules up to 0.8cm concerning for mets   - Patient has follow-up with oncology on 8/19, recommended to keep outpatient follow up

## 2024-08-17 ENCOUNTER — HOSPITAL ENCOUNTER (INPATIENT)
Facility: HOSPITAL | Age: 74
LOS: 2 days | Discharge: HOME HEALTH | DRG: 871 | End: 2024-08-20
Attending: EMERGENCY MEDICINE | Admitting: INTERNAL MEDICINE
Payer: MEDICARE

## 2024-08-17 DIAGNOSIS — N17.9 AKI (ACUTE KIDNEY INJURY): ICD-10-CM

## 2024-08-17 DIAGNOSIS — R53.1 GENERALIZED WEAKNESS: Primary | ICD-10-CM
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
## 2024-08-17 DIAGNOSIS — K76.82 HEPATIC ENCEPHALOPATHY: ICD-10-CM

## 2024-08-17 PROBLEM — E11.40 DIABETIC NEUROPATHY: Status: ACTIVE | Noted: 2022-01-03

## 2024-08-17 PROBLEM — I26.99 ACUTE PULMONARY EMBOLISM WITHOUT ACUTE COR PULMONALE: Status: RESOLVED | Noted: 2024-01-26 | Resolved: 2024-08-17

## 2024-08-17 PROBLEM — G93.41 SEPSIS WITH ENCEPHALOPATHY WITHOUT SEPTIC SHOCK: Status: ACTIVE | Noted: 2024-08-17

## 2024-08-17 PROBLEM — I73.9 PERIPHERAL VASCULAR DISEASE: Status: ACTIVE | Noted: 2022-01-03

## 2024-08-17 PROBLEM — C44.99 SEBACEOUS CARCINOMA: Status: ACTIVE | Noted: 2021-12-12

## 2024-08-17 PROBLEM — D72.829 LEUKOCYTOSIS: Status: ACTIVE | Noted: 2024-08-17

## 2024-08-17 PROBLEM — L72.3 SEBACEOUS CYST: Status: RESOLVED | Noted: 2021-11-17 | Resolved: 2024-08-17

## 2024-08-17 PROBLEM — J30.9 ALLERGIC RHINITIS: Status: ACTIVE | Noted: 2022-01-03

## 2024-08-17 PROBLEM — R65.20 SEPSIS WITH ENCEPHALOPATHY WITHOUT SEPTIC SHOCK: Status: ACTIVE | Noted: 2024-08-17

## 2024-08-17 PROBLEM — A41.9 SEPSIS WITH ENCEPHALOPATHY WITHOUT SEPTIC SHOCK: Status: ACTIVE | Noted: 2024-08-17

## 2024-08-17 LAB
ALBUMIN SERPL BCP-MCNC: 2.7 G/DL (ref 3.5–5.2)
ALLENS TEST: ABNORMAL
ALLENS TEST: ABNORMAL
ALP SERPL-CCNC: 377 U/L (ref 55–135)
ALT SERPL W/O P-5'-P-CCNC: 336 U/L (ref 10–44)
AMMONIA PLAS-SCNC: 86 UMOL/L (ref 10–50)
ANION GAP SERPL CALC-SCNC: 13 MMOL/L (ref 8–16)
AST SERPL-CCNC: 412 U/L (ref 10–40)
BACTERIA #/AREA URNS AUTO: ABNORMAL /HPF
BASOPHILS # BLD AUTO: 0.03 K/UL (ref 0–0.2)
BASOPHILS NFR BLD: 0.1 % (ref 0–1.9)
BILIRUB SERPL-MCNC: 1.7 MG/DL (ref 0.1–1)
BILIRUB UR QL STRIP: NEGATIVE
BNP SERPL-MCNC: 166 PG/ML (ref 0–99)
BUN SERPL-MCNC: 37 MG/DL (ref 8–23)
CALCIUM SERPL-MCNC: 9.1 MG/DL (ref 8.7–10.5)
CHLORIDE SERPL-SCNC: 101 MMOL/L (ref 95–110)
CK SERPL-CCNC: 207 U/L (ref 20–200)
CLARITY UR REFRACT.AUTO: ABNORMAL
CO2 SERPL-SCNC: 19 MMOL/L (ref 23–29)
COLOR UR AUTO: YELLOW
CREAT SERPL-MCNC: 1.8 MG/DL (ref 0.5–1.4)
DIFFERENTIAL METHOD BLD: ABNORMAL
EOSINOPHIL # BLD AUTO: 0 K/UL (ref 0–0.5)
EOSINOPHIL NFR BLD: 0 % (ref 0–8)
ERYTHROCYTE [DISTWIDTH] IN BLOOD BY AUTOMATED COUNT: 16 % (ref 11.5–14.5)
EST. GFR  (NO RACE VARIABLE): 39.3 ML/MIN/1.73 M^2
GLUCOSE SERPL-MCNC: 141 MG/DL (ref 70–110)
GLUCOSE UR QL STRIP: NEGATIVE
HCO3 UR-SCNC: 18.1 MMOL/L (ref 24–28)
HCT VFR BLD AUTO: 39.5 % (ref 40–54)
HCT VFR BLD CALC: 30 %PCV (ref 36–54)
HCV AB SERPL QL IA: NORMAL
HGB BLD-MCNC: 11.7 G/DL (ref 14–18)
HGB UR QL STRIP: NEGATIVE
HIV 1+2 AB+HIV1 P24 AG SERPL QL IA: NORMAL
HYALINE CASTS UR QL AUTO: 10 /LPF
IMM GRANULOCYTES # BLD AUTO: 0.11 K/UL (ref 0–0.04)
IMM GRANULOCYTES NFR BLD AUTO: 0.5 % (ref 0–0.5)
INFLUENZA A, MOLECULAR: NOT DETECTED
INFLUENZA B, MOLECULAR: NOT DETECTED
KETONES UR QL STRIP: ABNORMAL
LDH SERPL L TO P-CCNC: 3.32 MMOL/L (ref 0.5–2.2)
LEUKOCYTE ESTERASE UR QL STRIP: ABNORMAL
LYMPHOCYTES # BLD AUTO: 1 K/UL (ref 1–4.8)
LYMPHOCYTES NFR BLD: 4.7 % (ref 18–48)
MAGNESIUM SERPL-MCNC: 2.1 MG/DL (ref 1.6–2.6)
MCH RBC QN AUTO: 24.2 PG (ref 27–31)
MCHC RBC AUTO-ENTMCNC: 29.6 G/DL (ref 32–36)
MCV RBC AUTO: 82 FL (ref 82–98)
MICROSCOPIC COMMENT: ABNORMAL
MONOCYTES # BLD AUTO: 1.8 K/UL (ref 0.3–1)
MONOCYTES NFR BLD: 8.5 % (ref 4–15)
NEUTROPHILS # BLD AUTO: 18.5 K/UL (ref 1.8–7.7)
NEUTROPHILS NFR BLD: 86.2 % (ref 38–73)
NITRITE UR QL STRIP: NEGATIVE
NRBC BLD-RTO: 0 /100 WBC
OHS QRS DURATION: 80 MS
OHS QRS DURATION: 82 MS
OHS QTC CALCULATION: 443 MS
OHS QTC CALCULATION: 458 MS
PCO2 BLDA: 30.3 MMHG (ref 35–45)
PH SMN: 7.38 [PH] (ref 7.35–7.45)
PH UR STRIP: 6 [PH] (ref 5–8)
PLATELET # BLD AUTO: 107 K/UL (ref 150–450)
PMV BLD AUTO: ABNORMAL FL (ref 9.2–12.9)
PO2 BLDA: 50 MMHG (ref 40–60)
POC BE: -7 MMOL/L
POC IONIZED CALCIUM: 0.92 MMOL/L (ref 1.06–1.42)
POC SATURATED O2: 85 % (ref 95–100)
POC TCO2: 19 MMOL/L (ref 24–29)
POTASSIUM BLD-SCNC: 4.4 MMOL/L (ref 3.5–5.1)
POTASSIUM SERPL-SCNC: 5 MMOL/L (ref 3.5–5.1)
PROT SERPL-MCNC: 7.4 G/DL (ref 6–8.4)
PROT UR QL STRIP: ABNORMAL
RBC # BLD AUTO: 4.83 M/UL (ref 4.6–6.2)
RBC #/AREA URNS AUTO: 2 /HPF (ref 0–4)
RSV AG BY MOLECULAR METHOD: NOT DETECTED
SAMPLE: ABNORMAL
SAMPLE: ABNORMAL
SARS-COV-2 RNA RESP QL NAA+PROBE: NOT DETECTED
SITE: ABNORMAL
SITE: ABNORMAL
SODIUM BLD-SCNC: 141 MMOL/L (ref 136–145)
SODIUM SERPL-SCNC: 133 MMOL/L (ref 136–145)
SP GR UR STRIP: 1.02 (ref 1–1.03)
SQUAMOUS #/AREA URNS AUTO: 1 /HPF
TROPONIN I SERPL DL<=0.01 NG/ML-MCNC: 0.03 NG/ML (ref 0–0.03)
URN SPEC COLLECT METH UR: ABNORMAL
WBC # BLD AUTO: 21.46 K/UL (ref 3.9–12.7)
WBC #/AREA URNS AUTO: 19 /HPF (ref 0–5)

## 2024-08-17 PROCEDURE — 25000003 PHARM REV CODE 250: Performed by: HOSPITALIST

## 2024-08-17 PROCEDURE — 82140 ASSAY OF AMMONIA: CPT

## 2024-08-17 PROCEDURE — 87040 BLOOD CULTURE FOR BACTERIA: CPT | Performed by: HOSPITALIST

## 2024-08-17 PROCEDURE — 84295 ASSAY OF SERUM SODIUM: CPT

## 2024-08-17 PROCEDURE — 82803 BLOOD GASES ANY COMBINATION: CPT

## 2024-08-17 PROCEDURE — 87088 URINE BACTERIA CULTURE: CPT

## 2024-08-17 PROCEDURE — 83735 ASSAY OF MAGNESIUM: CPT

## 2024-08-17 PROCEDURE — 94761 N-INVAS EAR/PLS OXIMETRY MLT: CPT | Mod: XB

## 2024-08-17 PROCEDURE — 83880 ASSAY OF NATRIURETIC PEPTIDE: CPT

## 2024-08-17 PROCEDURE — 82962 GLUCOSE BLOOD TEST: CPT

## 2024-08-17 PROCEDURE — 93010 ELECTROCARDIOGRAM REPORT: CPT | Mod: ,,, | Performed by: INTERNAL MEDICINE

## 2024-08-17 PROCEDURE — 81001 URINALYSIS AUTO W/SCOPE: CPT

## 2024-08-17 PROCEDURE — G0378 HOSPITAL OBSERVATION PER HR: HCPCS

## 2024-08-17 PROCEDURE — 99900035 HC TECH TIME PER 15 MIN (STAT)

## 2024-08-17 PROCEDURE — 99285 EMERGENCY DEPT VISIT HI MDM: CPT | Mod: 25

## 2024-08-17 PROCEDURE — 84484 ASSAY OF TROPONIN QUANT: CPT

## 2024-08-17 PROCEDURE — 87389 HIV-1 AG W/HIV-1&-2 AB AG IA: CPT | Performed by: PHYSICIAN ASSISTANT

## 2024-08-17 PROCEDURE — 85025 COMPLETE CBC W/AUTO DIFF WBC: CPT

## 2024-08-17 PROCEDURE — 25000003 PHARM REV CODE 250

## 2024-08-17 PROCEDURE — 87186 SC STD MICRODIL/AGAR DIL: CPT | Mod: 59

## 2024-08-17 PROCEDURE — 0241U SARS-COV2 (COVID) WITH FLU/RSV BY PCR: CPT | Performed by: EMERGENCY MEDICINE

## 2024-08-17 PROCEDURE — 87077 CULTURE AEROBIC IDENTIFY: CPT

## 2024-08-17 PROCEDURE — 96361 HYDRATE IV INFUSION ADD-ON: CPT

## 2024-08-17 PROCEDURE — 93005 ELECTROCARDIOGRAM TRACING: CPT

## 2024-08-17 PROCEDURE — 85014 HEMATOCRIT: CPT

## 2024-08-17 PROCEDURE — 82550 ASSAY OF CK (CPK): CPT | Performed by: EMERGENCY MEDICINE

## 2024-08-17 PROCEDURE — 87086 URINE CULTURE/COLONY COUNT: CPT

## 2024-08-17 PROCEDURE — 84132 ASSAY OF SERUM POTASSIUM: CPT

## 2024-08-17 PROCEDURE — 83605 ASSAY OF LACTIC ACID: CPT

## 2024-08-17 PROCEDURE — 86803 HEPATITIS C AB TEST: CPT | Performed by: PHYSICIAN ASSISTANT

## 2024-08-17 PROCEDURE — 80053 COMPREHEN METABOLIC PANEL: CPT

## 2024-08-17 PROCEDURE — 82330 ASSAY OF CALCIUM: CPT

## 2024-08-17 RX ORDER — TALC
6 POWDER (GRAM) TOPICAL NIGHTLY PRN
Status: DISCONTINUED | OUTPATIENT
Start: 2024-08-17 | End: 2024-08-20 | Stop reason: HOSPADM

## 2024-08-17 RX ORDER — INSULIN ASPART 100 [IU]/ML
0-5 INJECTION, SOLUTION INTRAVENOUS; SUBCUTANEOUS
Status: DISCONTINUED | OUTPATIENT
Start: 2024-08-17 | End: 2024-08-20 | Stop reason: HOSPADM

## 2024-08-17 RX ORDER — IBUPROFEN 200 MG
16 TABLET ORAL
Status: DISCONTINUED | OUTPATIENT
Start: 2024-08-17 | End: 2024-08-20 | Stop reason: HOSPADM

## 2024-08-17 RX ORDER — GLUCAGON 1 MG
1 KIT INJECTION
Status: DISCONTINUED | OUTPATIENT
Start: 2024-08-17 | End: 2024-08-20 | Stop reason: HOSPADM

## 2024-08-17 RX ORDER — IBUPROFEN 200 MG
24 TABLET ORAL
Status: DISCONTINUED | OUTPATIENT
Start: 2024-08-17 | End: 2024-08-20 | Stop reason: HOSPADM

## 2024-08-17 RX ORDER — GUAIFENESIN AND DEXTROMETHORPHAN HYDROBROMIDE 10; 100 MG/5ML; MG/5ML
10 SYRUP ORAL EVERY 6 HOURS PRN
Status: DISCONTINUED | OUTPATIENT
Start: 2024-08-17 | End: 2024-08-20 | Stop reason: HOSPADM

## 2024-08-17 RX ORDER — LACTULOSE 10 G/15ML
30 SOLUTION ORAL 3 TIMES DAILY
Status: DISCONTINUED | OUTPATIENT
Start: 2024-08-18 | End: 2024-08-17

## 2024-08-17 RX ORDER — ALBUTEROL SULFATE 90 UG/1
2 INHALANT RESPIRATORY (INHALATION) EVERY 6 HOURS PRN
Status: DISCONTINUED | OUTPATIENT
Start: 2024-08-17 | End: 2024-08-20 | Stop reason: HOSPADM

## 2024-08-17 RX ORDER — ACETAMINOPHEN 325 MG/1
650 TABLET ORAL EVERY 8 HOURS PRN
Status: DISCONTINUED | OUTPATIENT
Start: 2024-08-17 | End: 2024-08-20 | Stop reason: HOSPADM

## 2024-08-17 RX ORDER — LEVOTHYROXINE SODIUM 100 UG/1
200 TABLET ORAL
Status: DISCONTINUED | OUTPATIENT
Start: 2024-08-18 | End: 2024-08-20 | Stop reason: HOSPADM

## 2024-08-17 RX ORDER — BENZONATATE 100 MG/1
100 CAPSULE ORAL 3 TIMES DAILY PRN
Status: DISCONTINUED | OUTPATIENT
Start: 2024-08-17 | End: 2024-08-20 | Stop reason: HOSPADM

## 2024-08-17 RX ORDER — LACTULOSE 10 G/15ML
30 SOLUTION ORAL 3 TIMES DAILY
Status: DISCONTINUED | OUTPATIENT
Start: 2024-08-17 | End: 2024-08-20 | Stop reason: HOSPADM

## 2024-08-17 RX ORDER — LACTULOSE 10 G/15ML
20 SOLUTION ORAL 3 TIMES DAILY
Status: DISCONTINUED | OUTPATIENT
Start: 2024-08-17 | End: 2024-08-17

## 2024-08-17 RX ORDER — ONDANSETRON HYDROCHLORIDE 2 MG/ML
4 INJECTION, SOLUTION INTRAVENOUS EVERY 6 HOURS PRN
Status: DISCONTINUED | OUTPATIENT
Start: 2024-08-17 | End: 2024-08-20 | Stop reason: HOSPADM

## 2024-08-17 RX ADMIN — APIXABAN 5 MG: 5 TABLET, FILM COATED ORAL at 10:08

## 2024-08-17 RX ADMIN — SODIUM CHLORIDE 500 ML: 9 INJECTION, SOLUTION INTRAVENOUS at 09:08

## 2024-08-18 PROBLEM — E66.09 CLASS 1 OBESITY DUE TO EXCESS CALORIES WITH SERIOUS COMORBIDITY AND BODY MASS INDEX (BMI) OF 34.0 TO 34.9 IN ADULT: Status: ACTIVE | Noted: 2024-08-05

## 2024-08-18 PROBLEM — R18.8 ASCITES: Status: RESOLVED | Noted: 2024-08-14 | Resolved: 2024-08-18

## 2024-08-18 LAB
ALBUMIN SERPL BCP-MCNC: 2.5 G/DL (ref 3.5–5.2)
ALP SERPL-CCNC: 364 U/L (ref 55–135)
ALT SERPL W/O P-5'-P-CCNC: 430 U/L (ref 10–44)
ANION GAP SERPL CALC-SCNC: 9 MMOL/L (ref 8–16)
AST SERPL-CCNC: 504 U/L (ref 10–40)
BASOPHILS # BLD AUTO: 0.05 K/UL (ref 0–0.2)
BASOPHILS NFR BLD: 0.3 % (ref 0–1.9)
BILIRUB SERPL-MCNC: 1.7 MG/DL (ref 0.1–1)
BUN SERPL-MCNC: 35 MG/DL (ref 8–23)
CALCIUM SERPL-MCNC: 8.6 MG/DL (ref 8.7–10.5)
CHLORIDE SERPL-SCNC: 100 MMOL/L (ref 95–110)
CO2 SERPL-SCNC: 22 MMOL/L (ref 23–29)
CREAT SERPL-MCNC: 1.4 MG/DL (ref 0.5–1.4)
DIFFERENTIAL METHOD BLD: ABNORMAL
EOSINOPHIL # BLD AUTO: 0.1 K/UL (ref 0–0.5)
EOSINOPHIL NFR BLD: 0.4 % (ref 0–8)
ERYTHROCYTE [DISTWIDTH] IN BLOOD BY AUTOMATED COUNT: 15.6 % (ref 11.5–14.5)
EST. GFR  (NO RACE VARIABLE): 53.1 ML/MIN/1.73 M^2
GLUCOSE SERPL-MCNC: 97 MG/DL (ref 70–110)
GRAM STN SPEC: NORMAL
GRAM STN SPEC: NORMAL
HCT VFR BLD AUTO: 36.3 % (ref 40–54)
HGB BLD-MCNC: 10.9 G/DL (ref 14–18)
IMM GRANULOCYTES # BLD AUTO: 0.07 K/UL (ref 0–0.04)
IMM GRANULOCYTES NFR BLD AUTO: 0.4 % (ref 0–0.5)
INR PPP: 1.6 (ref 0.8–1.2)
LACTATE SERPL-SCNC: 2.1 MMOL/L (ref 0.5–2.2)
LYMPHOCYTES # BLD AUTO: 1.3 K/UL (ref 1–4.8)
LYMPHOCYTES NFR BLD: 7.5 % (ref 18–48)
MAGNESIUM SERPL-MCNC: 2 MG/DL (ref 1.6–2.6)
MCH RBC QN AUTO: 24.1 PG (ref 27–31)
MCHC RBC AUTO-ENTMCNC: 30 G/DL (ref 32–36)
MCV RBC AUTO: 80 FL (ref 82–98)
MONOCYTES # BLD AUTO: 1.9 K/UL (ref 0.3–1)
MONOCYTES NFR BLD: 11.2 % (ref 4–15)
NEUTROPHILS # BLD AUTO: 13.6 K/UL (ref 1.8–7.7)
NEUTROPHILS NFR BLD: 80.2 % (ref 38–73)
NRBC BLD-RTO: 0 /100 WBC
PHOSPHATE SERPL-MCNC: 3.1 MG/DL (ref 2.7–4.5)
PLATELET # BLD AUTO: 104 K/UL (ref 150–450)
PMV BLD AUTO: 11.8 FL (ref 9.2–12.9)
POCT GLUCOSE: 113 MG/DL (ref 70–110)
POCT GLUCOSE: 140 MG/DL (ref 70–110)
POCT GLUCOSE: 141 MG/DL (ref 70–110)
POCT GLUCOSE: 144 MG/DL (ref 70–110)
POCT GLUCOSE: 160 MG/DL (ref 70–110)
POTASSIUM SERPL-SCNC: 4.7 MMOL/L (ref 3.5–5.1)
PROT SERPL-MCNC: 6.7 G/DL (ref 6–8.4)
PROTHROMBIN TIME: 16.8 SEC (ref 9–12.5)
RBC # BLD AUTO: 4.52 M/UL (ref 4.6–6.2)
SODIUM SERPL-SCNC: 131 MMOL/L (ref 136–145)
WBC # BLD AUTO: 16.95 K/UL (ref 3.9–12.7)

## 2024-08-18 PROCEDURE — 96367 TX/PROPH/DG ADDL SEQ IV INF: CPT

## 2024-08-18 PROCEDURE — 83735 ASSAY OF MAGNESIUM: CPT | Performed by: HOSPITALIST

## 2024-08-18 PROCEDURE — 25000003 PHARM REV CODE 250: Performed by: HOSPITALIST

## 2024-08-18 PROCEDURE — 63600175 PHARM REV CODE 636 W HCPCS: Performed by: HOSPITALIST

## 2024-08-18 PROCEDURE — 25000242 PHARM REV CODE 250 ALT 637 W/ HCPCS: Performed by: HOSPITALIST

## 2024-08-18 PROCEDURE — 25000003 PHARM REV CODE 250: Performed by: INTERNAL MEDICINE

## 2024-08-18 PROCEDURE — 20600001 HC STEP DOWN PRIVATE ROOM

## 2024-08-18 PROCEDURE — 84100 ASSAY OF PHOSPHORUS: CPT | Performed by: HOSPITALIST

## 2024-08-18 PROCEDURE — 85025 COMPLETE CBC W/AUTO DIFF WBC: CPT | Performed by: HOSPITALIST

## 2024-08-18 PROCEDURE — 87205 SMEAR GRAM STAIN: CPT

## 2024-08-18 PROCEDURE — 83605 ASSAY OF LACTIC ACID: CPT | Performed by: HOSPITALIST

## 2024-08-18 PROCEDURE — 96366 THER/PROPH/DIAG IV INF ADDON: CPT

## 2024-08-18 PROCEDURE — 96365 THER/PROPH/DIAG IV INF INIT: CPT

## 2024-08-18 PROCEDURE — 85610 PROTHROMBIN TIME: CPT | Performed by: HOSPITALIST

## 2024-08-18 PROCEDURE — 36415 COLL VENOUS BLD VENIPUNCTURE: CPT | Performed by: HOSPITALIST

## 2024-08-18 PROCEDURE — 80053 COMPREHEN METABOLIC PANEL: CPT | Performed by: HOSPITALIST

## 2024-08-18 RX ADMIN — LACTULOSE 30 G: 20 SOLUTION ORAL at 12:08

## 2024-08-18 RX ADMIN — CEFTRIAXONE 2 G: 2 INJECTION, POWDER, FOR SOLUTION INTRAMUSCULAR; INTRAVENOUS at 12:08

## 2024-08-18 RX ADMIN — LACTULOSE 30 G: 20 SOLUTION ORAL at 08:08

## 2024-08-18 RX ADMIN — VANCOMYCIN HYDROCHLORIDE 1250 MG: 1.25 INJECTION, POWDER, LYOPHILIZED, FOR SOLUTION INTRAVENOUS at 11:08

## 2024-08-18 RX ADMIN — RIFAXIMIN 550 MG: 550 TABLET ORAL at 08:08

## 2024-08-18 RX ADMIN — ALBUTEROL SULFATE 2 PUFF: 108 INHALANT RESPIRATORY (INHALATION) at 05:08

## 2024-08-18 RX ADMIN — LACTULOSE 30 G: 20 SOLUTION ORAL at 03:08

## 2024-08-18 RX ADMIN — CEFTRIAXONE 2 G: 2 INJECTION, POWDER, FOR SOLUTION INTRAMUSCULAR; INTRAVENOUS at 10:08

## 2024-08-18 RX ADMIN — VANCOMYCIN HYDROCHLORIDE 2000 MG: 500 INJECTION, POWDER, LYOPHILIZED, FOR SOLUTION INTRAVENOUS at 12:08

## 2024-08-18 RX ADMIN — SODIUM CHLORIDE 1000 ML: 9 INJECTION, SOLUTION INTRAVENOUS at 05:08

## 2024-08-18 RX ADMIN — APIXABAN 5 MG: 5 TABLET, FILM COATED ORAL at 08:08

## 2024-08-18 RX ADMIN — RIFAXIMIN 550 MG: 550 TABLET ORAL at 12:08

## 2024-08-18 RX ADMIN — LEVOTHYROXINE SODIUM 200 MCG: 100 TABLET ORAL at 05:08

## 2024-08-18 RX ADMIN — SODIUM CHLORIDE, POTASSIUM CHLORIDE, SODIUM LACTATE AND CALCIUM CHLORIDE 1000 ML: 600; 310; 30; 20 INJECTION, SOLUTION INTRAVENOUS at 01:08

## 2024-08-18 NOTE — SUBJECTIVE & OBJECTIVE
Interval History:     NO longer on oxygen this morning. PRN oxygen needed. Seen by Heme/Onc and discussed down trending platelets. Intermediate risk of HIT.  Will discontinue Lovenox and switch to Eliquis. Family concerned for safe ambulation. Consulted PT/OT.    Review of Systems   Constitutional:  Positive for fatigue. Negative for fever.   HENT:  Negative for congestion and trouble swallowing.    Eyes:  Negative for pain and visual disturbance.   Respiratory:  Negative for shortness of breath.    Cardiovascular:  Positive for leg swelling. Negative for chest pain.   Gastrointestinal:  Positive for abdominal distention. Negative for abdominal pain, constipation and diarrhea.   Genitourinary:  Negative for difficulty urinating and flank pain.   Skin:  Negative for rash.   Allergic/Immunologic: Negative for environmental allergies.   Neurological:  Negative for dizziness and numbness.   Psychiatric/Behavioral:  Negative for confusion and decreased concentration.      Objective:     Vital Signs (Most Recent):  Temp: 97.2 °F (36.2 °C) (08/16/24 1617)  Pulse: 87 (08/16/24 1617)  Resp: 18 (08/16/24 1617)  BP: (!) 174/95 (08/16/24 1617)  SpO2: 99 % (08/16/24 1617) Vital Signs (24h Range):  Temp:  [98.1 °F (36.7 °C)-98.3 °F (36.8 °C)] 98.3 °F (36.8 °C)  Pulse:  [] 111  Resp:  [17-18] 18  SpO2:  [95 %-98 %] 98 %  BP: (137-177)/(76-92) 142/92     Weight: 113.8 kg (250 lb 14.1 oz)  Body mass index is 34.03 kg/m².     Physical Exam  Vitals and nursing note reviewed.   Constitutional:       General: He is not in acute distress.     Appearance: He is well-developed. He is obese. He is ill-appearing. He is not diaphoretic.      Interventions: He is not intubated.  HENT:      Head: Normocephalic and atraumatic.   Eyes:      General: No scleral icterus.     Conjunctiva/sclera: Conjunctivae normal.   Cardiovascular:      Rate and Rhythm: Normal rate and regular rhythm.      Heart sounds: Normal heart sounds. No murmur  heard.  Pulmonary:      Effort: Pulmonary effort is normal. No accessory muscle usage or respiratory distress. He is not intubated.   Abdominal:      General: There is distension.      Palpations: Abdomen is soft.      Tenderness: There is no abdominal tenderness.   Musculoskeletal:      Right lower leg: Edema present.      Left lower leg: Edema present.   Skin:     General: Skin is warm and dry.      Coloration: Skin is not jaundiced or pale.   Neurological:      Mental Status: He is oriented to person, place, and time. Mental status is at baseline.      Motor: No seizure activity.   Psychiatric:         Mood and Affect: Mood normal.         Behavior: Behavior is not aggressive or combative.         Cognition and Memory: Cognition is not impaired.                Significant Labs: All pertinent labs within the past 24 hours have been reviewed.    Recent Labs   Lab 08/17/24 2012 08/17/24 2041   WBC 21.46*  --    RBC 4.83  --    HGB 11.7*  --    HCT 39.5* 30*   *  --    MCV 82  --    MCH 24.2*  --    MCHC 29.6*  --       Recent Labs   Lab 08/17/24 2012   CALCIUM 9.1   ALBUMIN 2.7*   PROT 7.4   *   K 5.0   CO2 19*      BUN 37*   CREATININE 1.8*   ALKPHOS 377*   *   *   BILITOT 1.7*       Significant Imaging: I have reviewed all pertinent imaging results/findings within the past 24 hours.

## 2024-08-18 NOTE — ASSESSMENT & PLAN NOTE
The likely etiology of thrombocytopenia is medication induced, the suspected medication(s) is/are Lovenox/Heparin . The patients 3 most recent labs are listed below.  Recent Labs     08/15/24  1034 08/16/24  0326 08/17/24 2012   PLT 72*  72* 63* 107*       Plan  - Platelets noted to be 79k on 8/13 in the setting of heparin exposure on 8/3. No new thromboses or skin necrosis despite lovenox exposure yesterday. 4T score is low to intermediate and HIT Ab has been sent  - Switched to a non-heparin anticoagulant such as argatroban or even a DOAC

## 2024-08-18 NOTE — NURSING
Patient came to the unit on a stretcher. VSS. Afebrile. Spo2 maintained@RA. Antibiotic Vancomycin and IV fluid LR contd.  Alert and orientedX4 with delayed response. [place] on hearing, hearing aid not available . Blood cultures and urine culture results pending. WBC elevated to 21.4, Cr to 1.8, Tbi to 1.7 and NH4 to 86. Call light within patient's reach. Bed in lowest position, wheels locked, bed alarm on.

## 2024-08-18 NOTE — HPI
74-year-old man with history of type 2 diabetes, hypertension, HCC, PE, portal vein thrombosis on anticoagulation presents to the emergency department after unwitnessed fall and concern for mental status change.      He was discharged from [facility] hospital medicine yesterday, during his recent admission but there were concerns for hepatic encephalopathy exacerbation, acute thrombocytopenia requiring Hematology evaluation and recent diagnosis of HCC.  He also was treated for ELAINE.  He was discharged yesterday and at bedside his son is present who reports that patient was doing well when he got home able to get out of the car on his own power ambulating without assistance, made himself a sandwich.  Patient's wife and son felt he was doing well.  Son lives nearby and was called by a step mom this morning after patient had an unwitnessed fall at home.  The patient could not get up under his own power and even with son's assistance he had difficulty.  Son noted that he seemed overall more confused and had trouble remembering events of the day.      In ED workup he is found with an acute leukocytosis of 21 yesterday white count was 9.8 his platelet count is up trending today is 107 from 63 no acute signs of bleeding.  Patient did take lactulose yesterday unclear when his last bowel movement was, today ammonia is 88.  Creatinine is also up trending today.  He is referred for admission

## 2024-08-18 NOTE — ASSESSMENT & PLAN NOTE
Increase lactulose dose to 30 g every 8 hours, restart on rifaximin  We will hold gabapentin at this time with change in his renal function and with worsening encephalopathy.  Of note patient was receiving this b.i.d. during his recent hospital stay without incident

## 2024-08-18 NOTE — PROGRESS NOTES
"Pharmacokinetic Initial Assessment & Plan: IV Vancomycin    Initiate IV Vancomycin 2000 mg x once   Then Vancomycin 1250 mg every 24 hours,   Obtain a Vancomycin trough 30 mins prior to the 3rd dose on 08/19 @ 2300   Desired empiric serum trough concentration is 15 to 20 mcg/mL    Pharmacy will continue to follow and monitor vancomycin.    [id number] with any questions regarding this assessment.     Thank you for the consult,   Jenn Bishop         Patient brief summary:  Howard G Sistrunk is a 74 y.o. male initiated on antimicrobial therapy with IV Vancomycin for treatment of suspected sepsis    Drug Allergies:   Review of patient's allergies indicates:  No Known Allergies    Actual Body Weight:   113.8 kg    Renal Function:   Estimated Creatinine Clearance: 47.6 mL/min (A) (based on SCr of 1.8 mg/dL (H)).,     Dialysis Method (if applicable):  N/A    CBC (last 72 hours):  Recent Labs   Lab Result Units 08/15/24  1034 08/16/24  0326 08/17/24 2012   WBC K/uL 10.91  10.91 9.82 21.46*   Hemoglobin g/dL 11.3*  11.3* 11.1* 11.7*   Hematocrit % 37.6*  37.6* 35.7* 39.5*   Platelets K/uL 72*  72* 63* 107*   Gran % % 82.0*  --  86.2*   Lymph % % 7.4*  --  4.7*   Mono % % 9.1  --  8.5   Eosinophil % % 0.5  --  0.0   Basophil % % 0.4  --  0.1   Differential Method  Automated  --  Automated       Metabolic Panel (last 72 hours):  Recent Labs   Lab Result Units 08/15/24  0540 08/16/24  0326 08/17/24 2012   Sodium mmol/L 139 137 133*   Potassium mmol/L 4.8 4.2 5.0   Chloride mmol/L 102 103 101   CO2 mmol/L 25 23 19*   Glucose mg/dL 105 99 141*   BUN mg/dL 39* 36* 37*   Creatinine mg/dL 1.3 1.3 1.8*   Albumin g/dL 2.6* 2.6* 2.7*   Total Bilirubin mg/dL 1.1* 1.1* 1.7*   Alkaline Phosphatase U/L 322* 330* 377*   AST U/L 401* 394* 412*   ALT U/L 295* 308* 336*   Magnesium mg/dL  --   --  2.1       Drug levels (last 3 results):  No results for input(s): "VANCOMYCINRA", "VANCORANDOM", "VANCOMYCINPE", "VANCOPEAK", "VANCOMYCINTR", " ""[facility]" in the last 72 hours.    Microbiologic Results:  Microbiology Results (last 7 days)       Procedure Component Value Units Date/Time    Blood culture [[id number]] Collected: 08/17/24 2216    Order Status: Sent Specimen: Blood from Peripheral, Lower Arm, Right     Blood culture [[id number]] Collected: 08/17/24 2216    Order Status: Sent Specimen: Blood from Peripheral, Lower Arm, Right             "

## 2024-08-18 NOTE — ASSESSMENT & PLAN NOTE
Thrombus not actually in atrial however at cavoatrial junction   Anticoagulated on enoxaparin.  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 Will switch Lovenox to Eliquis.

## 2024-08-18 NOTE — ASSESSMENT & PLAN NOTE
Mentation at baseline now   Will arrange lactulose on discharge and check with pharmacy regarding rifaximin

## 2024-08-18 NOTE — PLAN OF CARE
Patient lives with his wife. Patient is A/O x 3. Patient is not on dialysis or coumadin. Patient denies any dme use at home. Patient's wife will provide transportation home at discharge. CM will continue to follow.   08/18/24 1611   Discharge Assessment   Assessment Type Discharge Planning Assessment   Confirmed/corrected address, phone number and insurance Yes   Confirmed Demographics Correct on Facesheet   Source of Information patient   Reason For Admission Septic shock   People in Home spouse   Do you expect to return to your current living situation? Yes   Do you have help at home or someone to help you manage your care at home? Yes   Who are your caregiver(s) and their phone number(s)? [phone]   Prior to hospitilization cognitive status: Alert/Oriented   Current cognitive status: Alert/Oriented   Walking or Climbing Stairs Difficulty no   Dressing/Bathing Difficulty no   Equipment Currently Used at Home none   Readmission within 30 days? Yes   Patient currently being followed by outpatient case management? No   Do you currently have service(s) that help you manage your care at home? No   Do you take prescription medications? Yes   Do you have prescription coverage? Yes   Coverage [employer]   Do you have any problems affording any of your prescribed medications? No   Is the patient taking medications as prescribed? yes   Who is going to help you get home at discharge? Spouse-Tamiko   How do you get to doctors appointments? family or friend will provide   Are you on dialysis? No   Do you take coumadin? No   Discharge Plan A Home with family   Discharge Plan B Home   DME Needed Upon Discharge  none   Transition of Care Barriers None   OTHER   Name(s) of People in Home [phone]     [facility] - Transplant Stepdown  Initial Discharge Assessment       Primary Care Provider: Willard España MD    Admission Diagnosis: Hepatic encephalopathy [K76.82]  ELAINE (acute kidney injury)  [N17.9]  Generalized weakness [R53.1]  Fall, initial encounter [W19.XXXA]    Admission Date: 8/17/2024  Expected Discharge Date: 8/21/2024    Transition of Care Barriers: (P) None    Payor: [employer] MANAGED MEDICARE / Plan: [employer] MEDICARE PPO / Product Type: Medicare Advantage /     Extended Emergency Contact Information  Primary Emergency Contact: Sistrunk,Ethel  Address: [address] [id number] [place]  Mobile Phone: [phone]  Relation: Spouse  Secondary Emergency Contact: RosettaTom  Address: [address] [place] [address] [place]  Mobile Phone: [phone]  Relation: None    Discharge Plan A: (P) Home with family  Discharge Plan B: (P) Home      [id number], [place] - [address]  BY AT [facility] & [facility] [address]  [address] [facility] [address]  Phone: [phone] Fax: [phone]      Initial Assessment (most recent)       Adult Discharge Assessment - 08/18/24 1611          Discharge Assessment    Assessment Type Discharge Planning Assessment (P)      Confirmed/corrected address, phone number and insurance Yes (P)      Confirmed Demographics Correct on Facesheet (P)      Source of Information patient (P)      Reason For Admission Septic shock (P)      People in Home spouse (P)      Do you expect to return to your current living situation? Yes (P)      Do you have help at home or someone to help you manage your care at home? Yes (P)      Who are your caregiver(s) and their phone number(s)? [phone] (P)      Prior to hospitilization cognitive status: Alert/Oriented (P)      Current cognitive status: Alert/Oriented (P)      Walking or Climbing Stairs Difficulty no (P)      Dressing/Bathing Difficulty no (P)      Equipment Currently Used at Home none (P)      Readmission within 30 days? Yes (P)      Patient currently being followed by outpatient case management? No (P)      Do you  currently have service(s) that help you manage your care at home? No (P)      Do you take prescription medications? Yes (P)      Do you have prescription coverage? Yes (P)      Coverage [employer] (P)      Do you have any problems affording any of your prescribed medications? No (P)      Is the patient taking medications as prescribed? yes (P)      Who is going to help you get home at discharge? Spouse-Tamiko (P)      How do you get to doctors appointments? family or friend will provide (P)      Are you on dialysis? No (P)      Do you take coumadin? No (P)      Discharge Plan A Home with family (P)      Discharge Plan B Home (P)      DME Needed Upon Discharge  none (P)      Transition of Care Barriers None (P)         OTHER    Name(s) of People in Home [phone] (P)

## 2024-08-18 NOTE — SUBJECTIVE & OBJECTIVE
Interval History: 73 y/o with recent diagnosis of HCC, portal vein thrombosis due to HCC, h/o PE, atrial thrombus on Apixiban to treat - switched recently from Lovenox to Apixiban on recent hospital stay due to thrombocytopenia and concern for HIT but HIT was negative with recent [facility] [place] admit for hepatic encephalopathy and discharged after resolution on Lactulose and Rifaximin and returned < 24 hours later on 8/17 and readmitted with generalized weakness and fatigue. Concern for infection as WBC 21,000 and doubled in less than 1 day  as 10,000 on discharge on 8/16. Also has new ELAINE with Creatinine 1.8 and was 1.3 on 8/16. U/A done and did show show 19 WBC and occasional bacteria so possible source of infection. CXR done and no evidence of pneumonia noted. Urine and blood cultures sent. IR attempted US paracentesis on 8/16 on day of recent hospital discharge but not enough ascitic fluid to tap so doubt SBP as cause of infection. Patient given IVF's as per sepsis protocol. Patient started on IV Vancomycin and Rocephin. Creatinine better today and down to 1.4. WBC improving. Not encephalopathic on admit. CT head and cervical spine on admit unremarkable. No signs to suggest bleeding as Hgb stable. Platelets improved from last admit and up to 104,000 and was 63,000 on discharge on 8/16. Patient has not seen Oncology yet for his HCC and due to see them outpatient on 8/19 so will need to reschedule as in hospital to discuss potential treatment options. Not liver transplant candidate per Hepatology as large tumor and and has PVT involvement. LFT's elevated on admit but likely related to his known HCC.   Patient not very interactive on exam today and I mostly spoke with his wife who was at bedside on rounds today. Wife reports when patient got home from discharge on 8/16 he was feeling good and walking around house and even made himself a sandwich and ate it. She stated around 10:00 pm he went to bed as he stated he was tired  and throughout night she would wake up as sleeping in a separate room and check on him and he seemed fine until yesterday morning when she went to check on him and he was lying on the ground next to the bed. He was awake but told her he was oo weak to get up on his own and despite help was too weak to get up so she became concerned and reason they came back to ED yesterday.   Labs reviewed today and WBC is improved from yesterday and down to 16,900 from 21,4000 yesterday. Creatinine improved with IVF's and down to 1.4 today from 1.8 yesterday. Will give 1 liter of NS again today. Sodium 131 and was 133 yesterday so will need to watch and monitor. Possible related to hypovolemia and sepsis and will need to watch closely. Platelets stable at 104,000 and was 107,000 yesterday. Hgb stable at 10.9 with no clinical signs of bleeding. Holding home diuretics due to ELAINE.     Review of Systems   Constitutional:  Positive for fatigue. Negative for fever.   Respiratory:  Negative for cough and shortness of breath.    Cardiovascular:  Negative for chest pain and leg swelling.   Gastrointestinal:  Negative for abdominal pain, blood in stool, diarrhea, nausea and vomiting.   Musculoskeletal:  Negative for arthralgias.   Neurological:  Positive for weakness (Generalized). Negative for dizziness.   Psychiatric/Behavioral:  Negative for agitation and confusion.      Objective:     Vital Signs (Most Recent):  Temp: 98.2 °F (36.8 °C) (08/18/24 1600)  Pulse: 107 (08/18/24 1709)  Resp: 16 (08/18/24 1709)  BP: 160/70 (08/18/24 1600)  SpO2: 93 % (08/18/24 1709) on room air  Vital Signs (24h Range):  Temp:  [97.6 °F (36.4 °C)-98.8 °F (37.1 °C)] 98.2 °F (36.8 °C)  Pulse:  [] 107  Resp:  [17-22] 22  SpO2:  [93 %-98 %] 93 %  BP: (142-177)/(70-92) 160/70     Weight: 117 kg (257 lb 15 oz)  Body mass index is 34.98 kg/m².    Intake/Output Summary (Last 24 hours) at 8/18/2024 8487  Last data filed at 8/18/2024 1420  Gross per 24 hour    Intake 2814.18 ml   Output 200 ml   Net 2614.18 ml         Physical Exam  Vitals and nursing note reviewed.   Constitutional:       General: He is not in acute distress.     Appearance: He is obese. He is ill-appearing.      Comments: Patient very tired and fatigue appearing on exam. Wife at bedside. Patient very ill appearing.   Eyes:      General: No scleral icterus.  Neck:      Vascular: No JVD.   Cardiovascular:      Rate and Rhythm: Normal rate and regular rhythm.      Heart sounds: Normal heart sounds. No murmur heard.  Pulmonary:      Effort: Pulmonary effort is normal. No respiratory distress.      Breath sounds: Normal breath sounds. No wheezing.   Abdominal:      General: Abdomen is flat. Bowel sounds are normal. There is no distension.      Palpations: Abdomen is soft.      Tenderness: There is no abdominal tenderness. There is no guarding.   Musculoskeletal:      Cervical back: Neck supple.      Right lower leg: No edema.      Left lower leg: No edema.   Skin:     Coloration: Skin is not jaundiced.      Findings: No erythema or rash.   Neurological:      General: No focal deficit present.      Mental Status: He is oriented to person, place, and time.   Psychiatric:         Mood and Affect: Mood normal.         Behavior: Behavior normal. Behavior is cooperative.             Significant Labs: Blood Culture:   Recent Labs   Lab 08/17/24  2216   LABBLOO No Growth to date  No Growth to date     CBC:   Recent Labs   Lab 08/17/24 2012 08/17/24 2041 08/18/24  0632   WBC 21.46*  --  16.95*   HGB 11.7*  --  10.9*   HCT 39.5* 30* 36.3*   *  --  104*     CMP:   Recent Labs   Lab 08/17/24 2012 08/18/24  0632   * 131*   K 5.0 4.7    100   CO2 19* 22*   * 97   BUN 37* 35*   CREATININE 1.8* 1.4   CALCIUM 9.1 8.6*   PROT 7.4 6.7   ALBUMIN 2.7* 2.5*   BILITOT 1.7* 1.7*   ALKPHOS 377* 364*   * 504*   * 430*   ANIONGAP 13 9     Coagulation:   Recent Labs   Lab 08/18/24  0632    INR 1.6*     Lactic Acid:   Recent Labs   Lab 08/18/24  0632   LACTATE 2.1     Magnesium:   Recent Labs   Lab 08/17/24 2012 08/18/24  0632   MG 2.1 2.0     Urine Studies:   Recent Labs   Lab 08/17/24  2225   COLORU Yellow   APPEARANCEUA Hazy*   PHUR 6.0   SPECGRAV 1.025   PROTEINUA 2+*   GLUCUA Negative   KETONESU Trace*   BILIRUBINUA Negative   OCCULTUA Negative   NITRITE Negative   LEUKOCYTESUR 1+*   RBCUA 2   WBCUA 19*   BACTERIA Occasional   SQUAMEPITHEL 1   HYALINECASTS 10*     Recent Labs     08/17/24  2012   AMMONIA 86*        Significant Imaging: I have reviewed all pertinent imaging results/findings within the past 24 hours.

## 2024-08-18 NOTE — H&P
[facility]  History & Physical    Patient Name: Howard G Sistrunk  MRN: [id number]  Patient Class: OP- Observation  Admission Date: 8/17/2024  Attending Physician: Iesha Stahl MD [facility] L  Admitting Physician: Ulices Poe MD  Primary Care Provider: Willard España MD    Patient information was obtained from patient, past medical records, and ER records.     Subjective:     Principal Problem:Sepsis with encephalopathy without septic shock    Chief Complaint:   Chief Complaint   Patient presents with    Weakness     D/c from hospital yesterday for hepatic encephalopathy, CA with lung mets. Was feeling somewhat better but today became confused, weak, had a fall. Appear lethargic, answers questions appropriately.         HPI:   74-year-old man with history of type 2 diabetes, hypertension, HCC, PE, portal vein thrombosis on anticoagulation presents to the emergency department after unwitnessed fall and concern for mental status change.      He was discharged from [facility] hospital medicine yesterday, during his recent admission but there were concerns for hepatic encephalopathy exacerbation, acute thrombocytopenia requiring Hematology evaluation and recent diagnosis of HCC.  He also was treated for ELAINE.  He was discharged yesterday and at bedside his son is present who reports that patient was doing well when he got home able to get out of the car on his own power ambulating without assistance, made himself a sandwich.  Patient's wife and son felt he was doing well.  Son lives nearby and was called by a step mom this morning after patient had an unwitnessed fall at home.  The patient could not get up under his own power and even with son's assistance he had difficulty.  Son noted that he seemed overall more confused and had trouble remembering events of the day.      In ED workup he is found with an acute leukocytosis of 21 yesterday white  count was 9.8 his platelet count is up trending today is 107 from 63 no acute signs of bleeding.  Patient did take lactulose yesterday unclear when his last bowel movement was, today ammonia is 88.  Creatinine is also up trending today.  He is referred for admission    Past Medical History:   Diagnosis Date    Atrial thrombosis 08/03/2024    Diabetes mellitus, type 2     Essential hypertension     HCC (hepatocellular carcinoma)     Hepatic encephalopathy 08/13/2024    Obesity     Portal vein thrombosis 08/03/2024    Pulmonary embolism 01/26/2024    Sebaceous cyst 11/17/2021       Past Surgical History:   Procedure Laterality Date    ESOPHAGOGASTRODUODENOSCOPY N/A 8/6/2024    Procedure: EGD (ESOPHAGOGASTRODUODENOSCOPY);  Surgeon: Alexander Bernstein MD;  Location: [address]);  Service: Endoscopy;  Laterality: N/A;       Review of patient's allergies indicates:  No Known Allergies    No current facility-administered medications on file prior to encounter.     Current Outpatient Medications on File Prior to Encounter   Medication Sig    albuterol (PROVENTIL/VENTOLIN HFA) 90 mcg/actuation inhaler Inhale 2 puffs into the lungs every 6 (six) hours as needed for Wheezing. Rescue    apixaban (ELIQUIS) 5 mg Tab Take 1 tablet (5 mg total) by mouth 2 (two) times daily.    DULoxetine (CYMBALTA) 30 MG capsule Take 30 mg by mouth nightly.    furosemide (LASIX) 40 MG tablet Take 40 mg by mouth 2 (two) times daily.    gabapentin (NEURONTIN) 800 MG tablet Take 1,600 mg by mouth 2 (two) times daily.    HYDROcodone-acetaminophen (NORCO)  mg per tablet Take 1 tablet by mouth every 6 (six) hours as needed for Pain.    hydrocortisone 2.5 % cream Apply topically 2 (two) times daily.    lactulose (CHRONULAC) 10 gram/15 mL solution Take 30 mLs (20 g total) by mouth 3 (three) times daily. TITRATE TO 3-4 BOWEL MOVEMENTS DAILY.    levothyroxine (SYNTHROID) 200 MCG tablet Take 200 mcg by mouth before breakfast.    metFORMIN  (GLUCOPHAGE) 1000 MG tablet Take 1,000 mg by mouth 2 (two) times daily with meals.    pramipexole di-HCl (MIRAPEX ORAL) Take 1 mg by mouth every evening.    spironolactone (ALDACTONE) 100 MG tablet Take 1 tablet (100 mg total) by mouth once daily.     Family History    None       Tobacco Use    Smoking status: Never    Smokeless tobacco: Never   Substance and Sexual Activity    Alcohol use: Not on file    Drug use: Not on file    Sexual activity: Not on file     Review of Systems   Constitutional:  Positive for fatigue. Negative for fever.   HENT:  Negative for congestion and trouble swallowing.    Eyes:  Negative for pain and visual disturbance.   Respiratory:  Negative for shortness of breath.    Cardiovascular:  Positive for leg swelling. Negative for chest pain.   Gastrointestinal:  Positive for abdominal distention. Negative for abdominal pain, constipation and diarrhea.   Skin:  Negative for rash.   Allergic/Immunologic: Negative for environmental allergies.   Psychiatric/Behavioral:  Positive for confusion and decreased concentration.      Objective:     Vital Signs (Most Recent):  Temp: 98.1 °F (36.7 °C) (08/18/24 0000)  Pulse: 97 (08/18/24 0000)  Resp: 17 (08/18/24 0000)  BP: (!) 168/76 (08/18/24 0000)  SpO2: 96 % (08/18/24 0000) Vital Signs (24h Range):  Temp:  [98.1 °F (36.7 °C)] 98.1 °F (36.7 °C)  Pulse:  [] 97  Resp:  [17-18] 17  SpO2:  [95 %-96 %] 96 %  BP: (137-177)/(76-79) 168/76     Weight: 113.8 kg (250 lb 12.8 oz)  Body mass index is 34.01 kg/m².     Physical Exam  Vitals and nursing note reviewed.   Constitutional:       Appearance: He is obese. He is ill-appearing.   HENT:      Mouth/Throat:      Mouth: Mucous membranes are moist.   Eyes:      General: No scleral icterus.     Pupils: Pupils are equal, round, and reactive to light.   Cardiovascular:      Rate and Rhythm: Normal rate and regular rhythm.   Pulmonary:      Effort: Pulmonary effort is normal.   Abdominal:      General: Bowel  "sounds are normal. There is distension.      Palpations: Abdomen is soft.   Musculoskeletal:      Right lower leg: 3+ Pitting Edema present.      Left lower leg: 3+ Pitting Edema present.   Neurological:      Mental Status: He is disoriented.   Psychiatric:         Cognition and Memory: Cognition is impaired. He exhibits impaired recent memory.              CRANIAL NERVES     CN III, IV, VI   Pupils are equal, round, and reactive to light.       Significant Labs: All pertinent labs within the past 24 hours have been reviewed.  ABGs:   Recent Labs   Lab 08/17/24 2041   PH 7.383   PCO2 30.3*   HCO3 18.1*   POCSATURATED 85   BE -7*   PO2 50     Blood Culture: No results for input(s): "LABBLOO" in the last 48 hours.  CBC:   Recent Labs   Lab 08/16/24 0326 08/17/24 2012 08/17/24 2041   WBC 9.82 21.46*  --    HGB 11.1* 11.7*  --    HCT 35.7* 39.5* 30*   PLT 63* 107*  --      CMP:   Recent Labs   Lab 08/16/24 0326 08/17/24 2012    133*   K 4.2 5.0    101   CO2 23 19*   GLU 99 141*   BUN 36* 37*   CREATININE 1.3 1.8*   CALCIUM 9.0 9.1   PROT 6.9 7.4   ALBUMIN 2.6* 2.7*   BILITOT 1.1* 1.7*   ALKPHOS 330* 377*   * 412*   * 336*   ANIONGAP 11 13     Cardiac Markers:   Recent Labs   Lab 08/17/24 2012   *     Coagulation: No results for input(s): "PT", "INR", "APTT" in the last 48 hours.  Lactic Acid: No results for input(s): "LACTATE" in the last 48 hours.  Magnesium:   Recent Labs   Lab 08/17/24 2012   MG 2.1     Troponin:   Recent Labs   Lab 08/17/24 2012   TROPONINI 0.026     Urine Studies:   Recent Labs   Lab 08/17/24 2225   COLORU Yellow   APPEARANCEUA Hazy*   PHUR 6.0   SPECGRAV 1.025   PROTEINUA 2+*   GLUCUA Negative   KETONESU Trace*   BILIRUBINUA Negative   OCCULTUA Negative   NITRITE Negative   LEUKOCYTESUR 1+*   RBCUA 2   WBCUA 19*   BACTERIA Occasional   SQUAMEPITHEL 1   HYALINECASTS 10*       Significant Imaging: I have reviewed all pertinent imaging results/findings " within the past 24 hours.    CT HEAD WITHOUT CONTRAST; CT CERVICAL SPINE WITHOUT CONTRAST     CLINICAL HISTORY:  Head trauma, minor (Age >= 65y);Mental status change, unknown cause;; Neck trauma (Age >= 65y);     TECHNIQUE:  Low dose axial CT images obtained throughout the head without the use of intravenous contrast.  Axial, sagittal and coronal reconstructions were performed.     Low dose axial images, sagittal and coronal reformations were performed though the cervical spine.  Contrast was not administered.     COMPARISON:  CT chest abdomen pelvis 08/06/2024.     FINDINGS:  CT head:     The brain parenchyma is normal in attenuation.  No intracranial hemorrhage.  No mass effect. No evidence of acute infarction.     No extra-axial blood or fluid collections identified.     Ventricles are normal in size and configuration without hydrocephalus.     No fracture or focal osseous lesion. Mastoid air cells and paranasal sinuses are well aerated. Extracranial soft tissues are unremarkable.     CT cervical spine:     Spinal alignment: Straightening of the normal cervical lordosis.     Vertebrae: Vertebral body heights are maintained. No acute displaced fractures.  No osseous destructive lesion.     Discs: Severe disc height loss at C6-C7 with associated degenerative endplate changes.     Paraspinal soft tissues: Prevertebral soft tissues are normal. Unchanged solid 4 mm pulmonary nodule at the left lung apex.     Degenerative changes: Multilevel degenerative disc, uncovertebral, and facet joint degeneration contributing to at least moderate spinal canal stenosis at C6-C7 with moderate left neural foraminal narrowing at C3-C4 and moderate right neural foraminal narrowing at C4-C5 and C6-C7.     Impression:     No acute intracranial pathology, specifically no calvarial fracture or intracranial hemorrhage.     No acute displaced fracture or traumatic malalignment of the cervical spine.     Degenerative changes of the spine,  as above.     Electronically signed by resident: Jose Almanza  Date:                                            08/17/2024  Time:                                           20:31     Electronically signed by: Ayaan Dumont MD  Date:                                            08/17/2024    XR CHEST AP PORTABLE     CLINICAL HISTORY:  Weakness.     TECHNIQUE:  Single frontal view of the chest was performed.     COMPARISON:  08/16/2024.     FINDINGS:  Monitoring EKG leads are present.  The trachea is unremarkable.  The cardiomediastinal silhouette is within normal limits.  There is no evidence of free air beneath hemidiaphragms.  There are no pleural effusions.  There is no evidence of a pneumothorax.  There is no evidence of pneumomediastinum.  No airspace opacity is present.  There are degenerative changes in the osseous structures.     Impression:     No acute process.        Electronically signed by: Ayaan Dumont MD  Date:                                            08/17/2024  Time:                                           19:42  Assessment/Plan:     * Sepsis with encephalopathy without septic shock  Concern at this time is for sepsis which is causing worsening or exacerbation of hepatic encephalopathy and I suspect also the recurrence of ELAINE.      Source seems most likely to be acute cystitis, patient recently had abdominal ultrasound did not have enough ascites safe to tap, continue empiric antibiotics with vancomycin and ceftriaxone.      Obtain blood cultures, chest x-rays completed  -lactic acid is elevated 3.3, patient received 500 cc IV fluids we will give a total of another 1 L tonight and recheck lactic acid with a.m. labs    Antibiotics given-   Antibiotics (72h ago, onward)      Start     Stop Route Frequency Ordered    08/18/24 2330  vancomycin 1,250 mg in D5W 250 mL IVPB (Vial-Mate)         -- IV Every 24 hours (non-standard times) 08/17/24 2229    08/17/24 2330  vancomycin 2 g in dextrose 5 % 500 mL IVPB  "        08/18/24 1129 IV ED 1 Time 08/17/24 2227 08/17/24 2300  rifAXIMin tablet 550 mg         -- Oral 2 times daily 08/17/24 2151 08/17/24 2300  cefTRIAXone (ROCEPHIN) 2 g in D5W 100 mL IVPB (MB+)         -- IV Every 24 hours (non-standard times) 08/17/24 2200 08/17/24 2259  vancomycin - pharmacy to dose  (vancomycin IVPB (PEDS and ADULTS))        Placed in "And" Linked Group    -- IV pharmacy to manage frequency 08/17/24 2200          Latest lactate reviewed-  Recent Labs   Lab 08/17/24 2222   POCLAC 3.32*       Fall  Status post CT head and CT cervical spine without an acute traumatic injury  Fall precautions  When patient is more awake and alert and able to participate would obtain repeat PT OT consultations      Hepatic encephalopathy  Increase lactulose dose to 30 g every 8 hours, restart on rifaximin  We will hold gabapentin at this time with change in his renal function and with worsening encephalopathy.  Of note patient was receiving this b.i.d. during his recent hospital stay without incident      ELAINE (acute kidney injury)  ELAINE is likely due to  sepsis . Baseline creatinine is  1.3 . Most recent creatinine and eGFR are listed below.  Recent Labs     08/15/24  0540 08/16/24  0326 08/17/24 2012   CREATININE 1.3 1.3 1.8*   EGFRNORACEVR 58.0* 58.0* 39.3*      Plan  - ELAINE is worsening. Will continue current treatment  -treatment for sepsis as above will hold oral diuretics at this time.      Patient received a total of 1.5 L IV fluids tonight    PVT (portal vein thrombosis)  Continue on anticoagulation with the apixaban      History of pulmonary embolism  Continue anticoagulation with apixaban      Hepatocellular carcinoma  Follow-up with oncology as an outpatient on August 19th as planned      Atrial thrombosis  Continue on anticoagulation with the apixaban      Type 2 diabetes mellitus, without long-term current use of insulin  Hold metformin and continue on low-dose insulin sliding scale with " monitored Accu-Cheks a.c. t.i.d. and HS      Hypothyroidism  Resume home levothyroxine        VTE Risk Mitigation (From admission, onward)           Ordered     apixaban tablet 5 mg  2 times daily         08/17/24 2151                       On 08/17/2024, patient should be placed in hospital observation services under my care.          Ulices Poe MD  Department of Hospital Medicine  [facility] - Transplant Stepdown

## 2024-08-18 NOTE — SUBJECTIVE & OBJECTIVE
Past Medical History:   Diagnosis Date    Atrial thrombosis 08/03/2024    Diabetes mellitus, type 2     Essential hypertension     HCC (hepatocellular carcinoma)     Hepatic encephalopathy 08/13/2024    Obesity     Portal vein thrombosis 08/03/2024    Pulmonary embolism 01/26/2024    Sebaceous cyst 11/17/2021       Past Surgical History:   Procedure Laterality Date    ESOPHAGOGASTRODUODENOSCOPY N/A 8/6/2024    Procedure: EGD (ESOPHAGOGASTRODUODENOSCOPY);  Surgeon: Alexander Bernstein MD;  Location: [address]);  Service: Endoscopy;  Laterality: N/A;       Review of patient's allergies indicates:  No Known Allergies    No current facility-administered medications on file prior to encounter.     Current Outpatient Medications on File Prior to Encounter   Medication Sig    albuterol (PROVENTIL/VENTOLIN HFA) 90 mcg/actuation inhaler Inhale 2 puffs into the lungs every 6 (six) hours as needed for Wheezing. Rescue    apixaban (ELIQUIS) 5 mg Tab Take 1 tablet (5 mg total) by mouth 2 (two) times daily.    DULoxetine (CYMBALTA) 30 MG capsule Take 30 mg by mouth nightly.    furosemide (LASIX) 40 MG tablet Take 40 mg by mouth 2 (two) times daily.    gabapentin (NEURONTIN) 800 MG tablet Take 1,600 mg by mouth 2 (two) times daily.    HYDROcodone-acetaminophen (NORCO)  mg per tablet Take 1 tablet by mouth every 6 (six) hours as needed for Pain.    hydrocortisone 2.5 % cream Apply topically 2 (two) times daily.    lactulose (CHRONULAC) 10 gram/15 mL solution Take 30 mLs (20 g total) by mouth 3 (three) times daily. TITRATE TO 3-4 BOWEL MOVEMENTS DAILY.    levothyroxine (SYNTHROID) 200 MCG tablet Take 200 mcg by mouth before breakfast.    metFORMIN (GLUCOPHAGE) 1000 MG tablet Take 1,000 mg by mouth 2 (two) times daily with meals.    pramipexole di-HCl (MIRAPEX ORAL) Take 1 mg by mouth every evening.    spironolactone (ALDACTONE) 100 MG tablet Take 1 tablet (100 mg total) by mouth once daily.     Family History     None       Tobacco Use    Smoking status: Never    Smokeless tobacco: Never   Substance and Sexual Activity    Alcohol use: Not on file    Drug use: Not on file    Sexual activity: Not on file     Review of Systems   Constitutional:  Positive for fatigue. Negative for fever.   HENT:  Negative for congestion and trouble swallowing.    Eyes:  Negative for pain and visual disturbance.   Respiratory:  Negative for shortness of breath.    Cardiovascular:  Positive for leg swelling. Negative for chest pain.   Gastrointestinal:  Positive for abdominal distention. Negative for abdominal pain, constipation and diarrhea.   Skin:  Negative for rash.   Allergic/Immunologic: Negative for environmental allergies.   Psychiatric/Behavioral:  Positive for confusion and decreased concentration.      Objective:     Vital Signs (Most Recent):  Temp: 98.1 °F (36.7 °C) (08/18/24 0000)  Pulse: 97 (08/18/24 0000)  Resp: 17 (08/18/24 0000)  BP: (!) 168/76 (08/18/24 0000)  SpO2: 96 % (08/18/24 0000) Vital Signs (24h Range):  Temp:  [98.1 °F (36.7 °C)] 98.1 °F (36.7 °C)  Pulse:  [] 97  Resp:  [17-18] 17  SpO2:  [95 %-96 %] 96 %  BP: (137-177)/(76-79) 168/76     Weight: 113.8 kg (250 lb 12.8 oz)  Body mass index is 34.01 kg/m².     Physical Exam  Vitals and nursing note reviewed.   Constitutional:       Appearance: He is obese. He is ill-appearing.   HENT:      Mouth/Throat:      Mouth: Mucous membranes are moist.   Eyes:      General: No scleral icterus.     Pupils: Pupils are equal, round, and reactive to light.   Cardiovascular:      Rate and Rhythm: Normal rate and regular rhythm.   Pulmonary:      Effort: Pulmonary effort is normal.   Abdominal:      General: Bowel sounds are normal. There is distension.      Palpations: Abdomen is soft.   Musculoskeletal:      Right lower leg: 3+ Pitting Edema present.      Left lower leg: 3+ Pitting Edema present.   Neurological:      Mental Status: He is disoriented.   Psychiatric:          "Cognition and Memory: Cognition is impaired. He exhibits impaired recent memory.              CRANIAL NERVES     CN III, IV, VI   Pupils are equal, round, and reactive to light.       Significant Labs: All pertinent labs within the past 24 hours have been reviewed.  ABGs:   Recent Labs   Lab 08/17/24 2041   PH 7.383   PCO2 30.3*   HCO3 18.1*   POCSATURATED 85   BE -7*   PO2 50     Blood Culture: No results for input(s): "LABBLOO" in the last 48 hours.  CBC:   Recent Labs   Lab 08/16/24 0326 08/17/24 2012 08/17/24 2041   WBC 9.82 21.46*  --    HGB 11.1* 11.7*  --    HCT 35.7* 39.5* 30*   PLT 63* 107*  --      CMP:   Recent Labs   Lab 08/16/24 0326 08/17/24 2012    133*   K 4.2 5.0    101   CO2 23 19*   GLU 99 141*   BUN 36* 37*   CREATININE 1.3 1.8*   CALCIUM 9.0 9.1   PROT 6.9 7.4   ALBUMIN 2.6* 2.7*   BILITOT 1.1* 1.7*   ALKPHOS 330* 377*   * 412*   * 336*   ANIONGAP 11 13     Cardiac Markers:   Recent Labs   Lab 08/17/24 2012   *     Coagulation: No results for input(s): "PT", "INR", "APTT" in the last 48 hours.  Lactic Acid: No results for input(s): "LACTATE" in the last 48 hours.  Magnesium:   Recent Labs   Lab 08/17/24 2012   MG 2.1     Troponin:   Recent Labs   Lab 08/17/24 2012   TROPONINI 0.026     Urine Studies:   Recent Labs   Lab 08/17/24  2225   COLORU Yellow   APPEARANCEUA Hazy*   PHUR 6.0   SPECGRAV 1.025   PROTEINUA 2+*   GLUCUA Negative   KETONESU Trace*   BILIRUBINUA Negative   OCCULTUA Negative   NITRITE Negative   LEUKOCYTESUR 1+*   RBCUA 2   WBCUA 19*   BACTERIA Occasional   SQUAMEPITHEL 1   HYALINECASTS 10*       Significant Imaging: I have reviewed all pertinent imaging results/findings within the past 24 hours.    CT HEAD WITHOUT CONTRAST; CT CERVICAL SPINE WITHOUT CONTRAST     CLINICAL HISTORY:  Head trauma, minor (Age >= 65y);Mental status change, unknown cause;; Neck trauma (Age >= 65y);     TECHNIQUE:  Low dose axial CT images obtained throughout " the head without the use of intravenous contrast.  Axial, sagittal and coronal reconstructions were performed.     Low dose axial images, sagittal and coronal reformations were performed though the cervical spine.  Contrast was not administered.     COMPARISON:  CT chest abdomen pelvis 08/06/2024.     FINDINGS:  CT head:     The brain parenchyma is normal in attenuation.  No intracranial hemorrhage.  No mass effect. No evidence of acute infarction.     No extra-axial blood or fluid collections identified.     Ventricles are normal in size and configuration without hydrocephalus.     No fracture or focal osseous lesion. Mastoid air cells and paranasal sinuses are well aerated. Extracranial soft tissues are unremarkable.     CT cervical spine:     Spinal alignment: Straightening of the normal cervical lordosis.     Vertebrae: Vertebral body heights are maintained. No acute displaced fractures.  No osseous destructive lesion.     Discs: Severe disc height loss at C6-C7 with associated degenerative endplate changes.     Paraspinal soft tissues: Prevertebral soft tissues are normal. Unchanged solid 4 mm pulmonary nodule at the left lung apex.     Degenerative changes: Multilevel degenerative disc, uncovertebral, and facet joint degeneration contributing to at least moderate spinal canal stenosis at C6-C7 with moderate left neural foraminal narrowing at C3-C4 and moderate right neural foraminal narrowing at C4-C5 and C6-C7.     Impression:     No acute intracranial pathology, specifically no calvarial fracture or intracranial hemorrhage.     No acute displaced fracture or traumatic malalignment of the cervical spine.     Degenerative changes of the spine, as above.     Electronically signed by resident: Jose Almanza  Date:                                            08/17/2024  Time:                                           20:31     Electronically signed by: Ayaan Dumont MD  Date:                                             08/17/2024    XR CHEST AP PORTABLE     CLINICAL HISTORY:  Weakness.     TECHNIQUE:  Single frontal view of the chest was performed.     COMPARISON:  08/16/2024.     FINDINGS:  Monitoring EKG leads are present.  The trachea is unremarkable.  The cardiomediastinal silhouette is within normal limits.  There is no evidence of free air beneath hemidiaphragms.  There are no pleural effusions.  There is no evidence of a pneumothorax.  There is no evidence of pneumomediastinum.  No airspace opacity is present.  There are degenerative changes in the osseous structures.     Impression:     No acute process.        Electronically signed by: Ayaan Dumont MD  Date:                                            08/17/2024  Time:                                           19:42

## 2024-08-18 NOTE — ASSESSMENT & PLAN NOTE
Hold metformin and continue on low-dose insulin sliding scale with monitored Accu-Cheks a.c. t.i.d. and HS

## 2024-08-18 NOTE — PROVIDER PROGRESS NOTES - EMERGENCY DEPT.
Encounter Date: 8/17/2024    ED Physician Progress Notes          Physician Attestation Statement for Resident:  As the supervising MD   Physician Attestation Statement: I have personally seen and examined this patient.       ***I agree with the history unless otherwise noted.     ***As the supervising MD I agree with the PE unless otherwise noted.      ***I have reviewed and agree with the residents interpretation of the following unless otherwise noted:   ***I have personally reviewed and interpreted the patients laboratory studies:  ***I have personally reviewed and interpreted imaging studies: CXR: I have personally reviewed the CXR. No evidence of consolidation, cardiomegaly, pulmonary edema, pneumothroax    ***I have personally reviewed and interpreted the patient's EKG:  Sinus tachycardia with diffuse WI depression vs 1/2 ALEE inferolateral leads, similar but more pronounced from previous without reciprocal changes      ***I have also reviewed the following:   ***external records:     ***As the supervising MD I agree with the treatment, course, plan, and disposition unless otherwise noted.

## 2024-08-18 NOTE — AI DETERIORATION ALERT
RAPID RESPONSE NURSE AI ALERT       AI alert received.    Chart Reviewed: 08/17/2024, 10:37 PM    MRN: [id number]  Bed: ED 20/20    Dx: Hepatic encephalopathy    Howard G Sistrunk has a past medical history of Atrial thrombosis, Diabetes mellitus, type 2, Essential hypertension, HCC (hepatocellular carcinoma), Hepatic encephalopathy, Obesity, Portal vein thrombosis, Pulmonary embolism, and Sebaceous cyst.    Last VS: /78   Pulse 109   Temp 98.1 °F (36.7 °C) (Oral)   Resp 18   Wt 113.8 kg (250 lb 12.8 oz)   SpO2 96%   BMI 34.01 kg/m²     24H Vital Sign Range:  Temp:  [98.1 °F (36.7 °C)]   Pulse:  [109]   Resp:  [18]   BP: (137)/(78)   SpO2:  [95 %-96 %]     Level of Consciousness (AVPU): (S) responds to voice    Recent Labs     08/15/24  1034 08/16/24  0326 08/17/24 2012 08/17/24  2041   WBC 10.91  10.91 9.82 21.46*  --    HGB 11.3*  11.3* 11.1* 11.7*  --    HCT 37.6*  37.6* 35.7* 39.5* 30*   PLT 72*  72* 63* 107*  --        Recent Labs     08/15/24  0540 08/16/24  0326 08/17/24 2012    137 133*   K 4.8 4.2 5.0    103 101   CO2 25 23 19*   BUN 39* 36* 37*   CREATININE 1.3 1.3 1.8*    99 141*   MG  --   --  2.1        Recent Labs     08/17/24 2041   PH 7.383   PCO2 30.3*   PO2 50   HCO3 18.1*   POCSATURATED 85   BE -7*        OXYGEN: room air             MEWS score:  3    Bedside RNKeshia contacted for AI alert, recent POCT lactate 3.32 reports patient vital signs stable. No additional concerns verbalized at this time. Repeat VBG with lytes, lactic and serum lactic ordered for 0100. Instructed to call [phone] for further concerns or assistance.    DUYEN Noel RN

## 2024-08-18 NOTE — ASSESSMENT & PLAN NOTE
Status post CT head and CT cervical spine without an acute traumatic injury  Fall precautions  When patient is more awake and alert and able to participate would obtain repeat PT OT consultations

## 2024-08-18 NOTE — NURSING
Nurses Note -- 4 Eyes      8/18/2024   1:55 AM      Skin assessed during: Admit      [x] No Altered Skin Integrity Present    []Prevention Measures Documented      [] Yes- Altered Skin Integrity Present or Discovered   [] LDA Added if Not in Epic (Describe Wound)   [] New Altered Skin Integrity was Present on Admit and Documented in LDA   [] Wound Image Taken    Wound Care Consulted? No    Attending Nurse:  Kenny Davalos RN/Staff Member:   Tiffanie

## 2024-08-18 NOTE — ASSESSMENT & PLAN NOTE
Recent diagnosis   Anticoagulated on enoxaparin In anticipation of biopsy.  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

## 2024-08-18 NOTE — ASSESSMENT & PLAN NOTE
ELAINE is likely due to  sepsis . Baseline creatinine is  1.3 . Most recent creatinine and eGFR are listed below.  Recent Labs     08/15/24  0540 08/16/24  0326 08/17/24 2012   CREATININE 1.3 1.3 1.8*   EGFRNORACEVR 58.0* 58.0* 39.3*      Plan  - ELAINE is worsening. Will continue current treatment  -treatment for sepsis as above will hold oral diuretics at this time.      Patient received a total of 1.5 L IV fluids tonight

## 2024-08-18 NOTE — ED NOTES
Telemetry Verification   Patient placed on Telemetry Box  Verified with War Room  Tech War room   Box #    Rate [phone]   Rhythm Sinus Tach

## 2024-08-18 NOTE — ED PROVIDER NOTES
Encounter Date: 8/17/2024       History     Chief Complaint   Patient presents with    Weakness     D/c from hospital yesterday for hepatic encephalopathy, CA with lung mets. Was feeling somewhat better but today became confused, weak, had a fall. Appear lethargic, answers questions appropriately.      74-year-old male with history of type 2 DM, atrial thrombosis, HTN, HCC, cancer with lung metastasis, hepatic encephalopathy, portal vein thrombosis, PE (on Eliquis) and history as stated below who presents to the ED for chief complaint of generalized weakness and fatigue.  Wife is at bedside.  Wife states that patient still appeared pretty fatigued after he was discharged.  Patient has had intermittent periods of confusion.  Wife notes that patient took his lactulose last night but has not had a BM.  This morning, patient was found on the floor after an unwitnessed fall.  Patient states that he tripped and fell.  He denies hitting his head and LOC.  Patient does take Eliquis.  Of note, patient was recently discharged from the hospital yesterday on 08/16/24 after being admitted for hepatic encephalopathy.  Denies any fevers, chest pain, SOB, abdominal pain, nausea, vomiting, or changes in urination.    The history is provided by the patient and the spouse. No  was used.     Review of patient's allergies indicates:  No Known Allergies  Past Medical History:   Diagnosis Date    Atrial thrombosis 08/03/2024    Diabetes mellitus, type 2     Essential hypertension     HCC (hepatocellular carcinoma)     Hepatic encephalopathy 08/13/2024    Obesity     Portal vein thrombosis 08/03/2024    Pulmonary embolism 01/26/2024    Sebaceous cyst 11/17/2021     Past Surgical History:   Procedure Laterality Date    ESOPHAGOGASTRODUODENOSCOPY N/A 8/6/2024    Procedure: EGD (ESOPHAGOGASTRODUODENOSCOPY);  Surgeon: Alexander Bernstein MD;  Location: [address]);  Service: Endoscopy;  Laterality: N/A;     No  family history on file.  Social History     Tobacco Use    Smoking status: Never    Smokeless tobacco: Never     Review of Systems    Physical Exam     Initial Vitals [08/17/24 1823]   BP Pulse Resp Temp SpO2   137/78 109 18 98.1 °F (36.7 °C) 95 %      MAP       --         Physical Exam    Nursing note and vitals reviewed.  Constitutional: No distress.   Patient is laying in bed and is ill-appearing.  He also appears fatigued.  He is able to answer questions appropriately.   HENT:   Head: Normocephalic.   Mouth/Throat: Oropharynx is clear and moist.   No contusions, abrasions, lacerations, or step-offs.  No freedman sign.   Eyes: Conjunctivae and EOM are normal. No scleral icterus.   No raccoon eyes.   Neck:   Normal range of motion.  Cardiovascular:  Regular rhythm and normal heart sounds.           Mild tachycardia   Pulmonary/Chest: Breath sounds normal. No respiratory distress. He has no wheezes. He has no rhonchi. He has no rales. He exhibits no tenderness.   Abdominal: Abdomen is soft. He exhibits no distension and no mass. There is no abdominal tenderness. There is no rebound and no guarding.   Musculoskeletal:         General: Edema (BLE pitting edema) present. No tenderness. Normal range of motion.      Cervical back: Normal range of motion.      Comments: No midline spinal tenderness.     Neurological: He is alert.   Skin: Skin is warm. Capillary refill takes less than 2 seconds.   Psychiatric: He has a normal mood and affect.         ED Course   Procedures  Labs Reviewed   CBC W/ AUTO DIFFERENTIAL - Abnormal       Result Value    WBC 21.46 (*)     RBC 4.83      Hemoglobin 11.7 (*)     Hematocrit 39.5 (*)     MCV 82      MCH 24.2 (*)     MCHC 29.6 (*)     RDW 16.0 (*)     Platelets 107 (*)     MPV SEE COMMENT      Immature Granulocytes 0.5      Gran # (ANC) 18.5 (*)     Immature Grans (Abs) 0.11 (*)     Lymph # 1.0      Mono # 1.8 (*)     Eos # 0.0      Baso # 0.03      nRBC 0      Gran % 86.2 (*)     Lymph  % 4.7 (*)     Mono % 8.5      Eosinophil % 0.0      Basophil % 0.1      Differential Method Automated     COMPREHENSIVE METABOLIC PANEL - Abnormal    Sodium 133 (*)     Potassium 5.0      Chloride 101      CO2 19 (*)     Glucose 141 (*)     BUN 37 (*)     Creatinine 1.8 (*)     Calcium 9.1      Total Protein 7.4      Albumin 2.7 (*)     Total Bilirubin 1.7 (*)     Alkaline Phosphatase 377 (*)      (*)      (*)     eGFR 39.3 (*)     Anion Gap 13     URINALYSIS, REFLEX TO URINE CULTURE - Abnormal    Specimen UA Urine, Clean Catch      Color, UA Yellow      Appearance, UA Hazy (*)     pH, UA 6.0      Specific Gravity, UA 1.025      Protein, UA 2+ (*)     Glucose, UA Negative      Ketones, UA Trace (*)     Bilirubin (UA) Negative      Occult Blood UA Negative      Nitrite, UA Negative      Leukocytes, UA 1+ (*)     Narrative:     Specimen Source->Urine   B-TYPE NATRIURETIC PEPTIDE - Abnormal     (*)    AMMONIA - Abnormal    Ammonia 86 (*)    URINALYSIS MICROSCOPIC - Abnormal    RBC, UA 2      WBC, UA 19 (*)     Bacteria Occasional      Squam Epithel, UA 1      Hyaline Casts, UA 10 (*)     Microscopic Comment SEE COMMENT      Narrative:     Specimen Source->Urine   ISTAT PROCEDURE - Abnormal    POC PH 7.383      POC PCO2 30.3 (*)     POC PO2 50      POC HCO3 18.1 (*)     POC BE -7 (*)     POC SATURATED O2 85      POC Sodium 141      POC Potassium 4.4      POC TCO2 19 (*)     POC Ionized Calcium 0.92 (*)     POC Hematocrit 30 (*)     Sample VENOUS      Site Other      Allens Test N/A     ISTAT LACTATE - Abnormal    POC Lactate 3.32 (*)     Sample VENOUS      Site Other      Allens Test N/A     CULTURE, BLOOD   CULTURE, BLOOD   CULTURE, URINE   HIV 1 / 2 ANTIBODY    HIV 1/2 Ag/Ab Non-reactive      Narrative:     Release to patient->Immediate   HEPATITIS C ANTIBODY    Hepatitis C Ab Non-reactive      Narrative:     Release to patient->Immediate   TROPONIN I    Troponin I 0.026     MAGNESIUM    Magnesium  2.1     SARS-COV2 (COVID) WITH FLU/RSV BY PCR    SARS-CoV2 (COVID-19) Qualitative PCR Not Detected      Influenza A, Molecular Not Detected      Influenza B, Molecular Not Detected      RSV Ag by Molecular Method Not Detected     CK   LACTIC ACID, PLASMA   POCT GLUCOSE MONITORING CONTINUOUS   POCT GLUCOSE MONITORING CONTINUOUS        ECG Results              EKG 12-lead (Final result)        Collection Time Result Time QRS Duration [facility] QTC Calculation    08/17/24 20:13:47 08/17/24 21:38:31 82 458                     Final result by Interface, Lab In [facility] (08/17/24 21:38:33)                   Narrative:    Test Reason :     Vent. Rate : 102 BPM     Atrial Rate : 102 BPM     P-R Int : 170 ms          QRS Dur : 082 ms      QT Int : 352 ms       P-R-T Axes : 076 008 104 degrees     QTc Int : 458 ms    Sinus tachycardia  Minimal voltage criteria for LVH, may be normal variant  Low anterior forces  Abnormal ECG  When compared with ECG of 17-AUG-2024 19:31,  No significant change was found  Confirmed by JENNIFER VELIZ MD ([id number]) on 8/17/2024 9:38:30 PM    Referred By: [email or web address]   SELF           Confirmed By:HOMEYAR KEREN DANG                                     EKG 12-lead (Final result)        Collection Time Result Time QRS Duration [facility]HS QTC Calculation    08/17/24 19:31:53 08/17/24 21:37:31 80 443                     Final result by Interface, Lab In [facility] (08/17/24 21:37:40)                   Narrative:    Test Reason : R53.1,    Vent. Rate : 101 BPM     Atrial Rate : 101 BPM     P-R Int : 174 ms          QRS Dur : 080 ms      QT Int : 342 ms       P-R-T Axes : 078 010 094 degrees     QTc Int : 443 ms    Sinus tachycardia  Low anterior forces  Abnormal ECG  When compared with ECG of 03-AUG-2024 20:20,  Premature atrial complexes are no longer Present  T wave inversion less evident in Anterior-lateral leads  Confirmed by JENNIFER VELIZ MD ([id number]) on 8/17/2024 9:37:30 PM    Referred By: [email or web address]   SELF            Confirmed By:JENNIFER VELIZ MD                                  Imaging Results              CT Cervical Spine Without Contrast (Final result)  Result time 08/17/24 20:57:26      Final result by Ayaan Dumont MD (08/17/24 20:57:26)                   Impression:      No acute intracranial pathology, specifically no calvarial fracture or intracranial hemorrhage.    No acute displaced fracture or traumatic malalignment of the cervical spine.    Degenerative changes of the spine, as above.    Electronically signed by resident: Jose Almanza  Date:    08/17/2024  Time:    20:31    Electronically signed by: Ayaan Dumont MD  Date:    08/17/2024  Time:    20:57               Narrative:    EXAMINATION:  CT HEAD WITHOUT CONTRAST; CT CERVICAL SPINE WITHOUT CONTRAST    CLINICAL HISTORY:  Head trauma, minor (Age >= 65y);Mental status change, unknown cause;; Neck trauma (Age >= 65y);    TECHNIQUE:  Low dose axial CT images obtained throughout the head without the use of intravenous contrast.  Axial, sagittal and coronal reconstructions were performed.    Low dose axial images, sagittal and coronal reformations were performed though the cervical spine.  Contrast was not administered.    COMPARISON:  CT chest abdomen pelvis 08/06/2024.    FINDINGS:  CT head:    The brain parenchyma is normal in attenuation.  No intracranial hemorrhage.  No mass effect. No evidence of acute infarction.    No extra-axial blood or fluid collections identified.    Ventricles are normal in size and configuration without hydrocephalus.    No fracture or focal osseous lesion. Mastoid air cells and paranasal sinuses are well aerated. Extracranial soft tissues are unremarkable.    CT cervical spine:    Spinal alignment: Straightening of the normal cervical lordosis.    Vertebrae: Vertebral body heights are maintained. No acute displaced fractures.  No osseous destructive lesion.    Discs: Severe disc height loss at C6-C7 with associated degenerative endplate  changes.    Paraspinal soft tissues: Prevertebral soft tissues are normal. Unchanged solid 4 mm pulmonary nodule at the left lung apex.    Degenerative changes: Multilevel degenerative disc, uncovertebral, and facet joint degeneration contributing to at least moderate spinal canal stenosis at C6-C7 with moderate left neural foraminal narrowing at C3-C4 and moderate right neural foraminal narrowing at C4-C5 and C6-C7.                                       CT Head Without Contrast (Final result)  Result time 08/17/24 20:57:26      Final result by Ayaan Dumont MD (08/17/24 20:57:26)                   Impression:      No acute intracranial pathology, specifically no calvarial fracture or intracranial hemorrhage.    No acute displaced fracture or traumatic malalignment of the cervical spine.    Degenerative changes of the spine, as above.    Electronically signed by resident: Jose Almanza  Date:    08/17/2024  Time:    20:31    Electronically signed by: Ayaan Dumont MD  Date:    08/17/2024  Time:    20:57               Narrative:    EXAMINATION:  CT HEAD WITHOUT CONTRAST; CT CERVICAL SPINE WITHOUT CONTRAST    CLINICAL HISTORY:  Head trauma, minor (Age >= 65y);Mental status change, unknown cause;; Neck trauma (Age >= 65y);    TECHNIQUE:  Low dose axial CT images obtained throughout the head without the use of intravenous contrast.  Axial, sagittal and coronal reconstructions were performed.    Low dose axial images, sagittal and coronal reformations were performed though the cervical spine.  Contrast was not administered.    COMPARISON:  CT chest abdomen pelvis 08/06/2024.    FINDINGS:  CT head:    The brain parenchyma is normal in attenuation.  No intracranial hemorrhage.  No mass effect. No evidence of acute infarction.    No extra-axial blood or fluid collections identified.    Ventricles are normal in size and configuration without hydrocephalus.    No fracture or focal osseous lesion. Mastoid air cells and paranasal  sinuses are well aerated. Extracranial soft tissues are unremarkable.    CT cervical spine:    Spinal alignment: Straightening of the normal cervical lordosis.    Vertebrae: Vertebral body heights are maintained. No acute displaced fractures.  No osseous destructive lesion.    Discs: Severe disc height loss at C6-C7 with associated degenerative endplate changes.    Paraspinal soft tissues: Prevertebral soft tissues are normal. Unchanged solid 4 mm pulmonary nodule at the left lung apex.    Degenerative changes: Multilevel degenerative disc, uncovertebral, and facet joint degeneration contributing to at least moderate spinal canal stenosis at C6-C7 with moderate left neural foraminal narrowing at C3-C4 and moderate right neural foraminal narrowing at C4-C5 and C6-C7.                                       X-Ray Chest AP Portable (Final result)  Result time 08/17/24 19:42:15      Final result by Ayaan Dumont MD (08/17/24 19:42:15)                   Impression:      No acute process.      Electronically signed by: Ayaan Dumont MD  Date:    08/17/2024  Time:    19:42               Narrative:    EXAMINATION:  XR CHEST AP PORTABLE    CLINICAL HISTORY:  Weakness.    TECHNIQUE:  Single frontal view of the chest was performed.    COMPARISON:  08/16/2024.    FINDINGS:  Monitoring EKG leads are present.  The trachea is unremarkable.  The cardiomediastinal silhouette is within normal limits.  There is no evidence of free air beneath hemidiaphragms.  There are no pleural effusions.  There is no evidence of a pneumothorax.  There is no evidence of pneumomediastinum.  No airspace opacity is present.  There are degenerative changes in the osseous structures.                                       Medications   apixaban tablet 5 mg (5 mg Oral Given 8/17/24 2222)   levothyroxine tablet 200 mcg (has no administration in time range)   rifAXIMin tablet 550 mg (has no administration in time range)   acetaminophen tablet 650 mg (has no  administration in time range)   albuterol inhaler 2 puff (has no administration in time range)   benzonatate capsule 100 mg (has no administration in time range)   dextromethorphan-guaiFENesin  mg/5 ml liquid 10 mL (has no administration in time range)   dextrose 10% bolus 125 mL 125 mL (has no administration in time range)   dextrose 10% bolus 250 mL 250 mL (has no administration in time range)   glucagon (human recombinant) injection 1 mg (has no administration in time range)   glucose chewable tablet 16 g (has no administration in time range)   glucose chewable tablet 24 g (has no administration in time range)   insulin aspart U-100 pen 0-5 Units (has no administration in time range)   melatonin tablet 6 mg (has no administration in time range)   ondansetron injection 4 mg (has no administration in time range)   cefTRIAXone (ROCEPHIN) 2 g in D5W 100 mL IVPB (MB+) (has no administration in time range)   vancomycin - pharmacy to dose (has no administration in time range)   lactulose 20 gram/30 mL solution Soln 30 g (has no administration in time range)   vancomycin 2 g in dextrose 5 % 500 mL IVPB (has no administration in time range)   vancomycin 1,250 mg in D5W 250 mL IVPB (Vial-Mate) (1,250 mg Intravenous Trough Due As Scheduled Before Dose 8/19/24 2200)   sodium chloride 0.9% bolus 500 mL 500 mL (0 mLs Intravenous Stopped 8/17/24 2245)     Medical Decision Making  74-year-old male who presents with generalized weakness, fatigue, and a fall.  He is mildly tachycardic, other vitals WNL.  On exam, he is ill-appearing and appears fatigued.  BLE pitting edema.  No obvious signs of trauma.  Please see physical exam findings above for additional details.  Differential diagnoses include but are not limited to hepatic encephalopathy, ACS, CHF, electrolyte abnormality, closed head injury, ICH, cervical fracture, UTI.  Will evaluate for intracranial pathology, elevated ammonia, infectious etiology, and cardiac  etiology.  Obtained labs and CXR.  Ordered CT head and CT cervical spine.  Administered 500 mL bolus of normal saline.  Please see ED course for additional details.    Patient will be admitted to Hospital Medicine for observation in the setting of his hepatic encephalopathy, ELAINE, and generalized weakness.    Amount and/or Complexity of Data Reviewed  Labs: ordered. Decision-making details documented in ED Course.  Radiology: ordered. Decision-making details documented in ED Course.  ECG/medicine tests: ordered and independent interpretation performed. Decision-making details documented in ED Course.               ED Course as of 08/17/24 2303   Sat Aug 17, 2024   1915 Patient was discharged yesterday after having hepatic encephalopathy.  She came back today for confusion and generalized weakness. [MD]   2033 EKG 12-lead  EKG shows sinus tachycardic, rate of 102 beats per minute, and no STEMI. [MD]   2100 X-Ray Chest AP Portable  CXR shows no acute cardiopulmonary abnormalities. [MD]   2100 CT Head Without Contrast  CT head shows no acute intracranial abnormalities. [MD]   2100 CT Cervical Spine Without Contrast  CT cervical spine shows no acute fractures or dislocations. [MD]   2117 WBC(!): 21.46  Leukocytosis [MD]   2133 Ammonia(!): 86  Elevated ammonia [MD]   2133 BNP(!): 166  Mildly elevated BNP [MD]      ED Course User Index  [MD] Jayson Magaña MD                             Clinical Impression:  Final diagnoses:  [R53.1] Generalized weakness (Primary)  [K76.82] Hepatic encephalopathy  [W19.XXXA] Fall, initial encounter          ED Disposition Condition    Observation Stable                     Jayson Magaña MD  Resident  08/18/24 0029

## 2024-08-18 NOTE — ASSESSMENT & PLAN NOTE
"Concern at this time is for sepsis which is causing worsening or exacerbation of hepatic encephalopathy and I suspect also the recurrence of ELAINE.      Source seems most likely to be acute cystitis, patient recently had abdominal ultrasound did not have enough ascites safe to tap, continue empiric antibiotics with vancomycin and ceftriaxone.      Obtain blood cultures, chest x-rays completed  -lactic acid is elevated 3.3, patient received 500 cc IV fluids we will give a total of another 1 L tonight and recheck lactic acid with a.m. labs    Antibiotics given-   Antibiotics (72h ago, onward)      Start     Stop Route Frequency Ordered    08/18/24 2330  vancomycin 1,250 mg in D5W 250 mL IVPB (Vial-Mate)         -- IV Every 24 hours (non-standard times) 08/17/24 2229 08/17/24 2330  vancomycin 2 g in dextrose 5 % 500 mL IVPB         08/18/24 1129 IV ED 1 Time 08/17/24 2227 08/17/24 2300  rifAXIMin tablet 550 mg         -- Oral 2 times daily 08/17/24 2151 08/17/24 2300  cefTRIAXone (ROCEPHIN) 2 g in D5W 100 mL IVPB (MB+)         -- IV Every 24 hours (non-standard times) 08/17/24 2200 08/17/24 2259  vancomycin - pharmacy to dose  (vancomycin IVPB (PEDS and ADULTS))        Placed in "And" Linked Group    -- IV pharmacy to manage frequency 08/17/24 2200          Latest lactate reviewed-  Recent Labs   Lab 08/17/24 2222   POCLAC 3.32*     "

## 2024-08-18 NOTE — ED TRIAGE NOTES
Howard G Sistrunk, a 74 y.o. male presents to the ED w/ complaint of discharged from hospital yesterday hepatic encephalopathy. Wife reports patient had fall at home this am and patient has been lethargic and not acting like himself.    Triage note:  Chief Complaint   Patient presents with    Weakness     D/c from hospital yesterday for hepatic encephalopathy, CA with lung mets. Was feeling somewhat better but today became confused, weak, had a fall. Appear lethargic, answers questions appropriately.      Review of patient's allergies indicates:  No Known Allergies  Past Medical History:   Diagnosis Date    Atrial thrombosis 08/03/2024    Diabetes mellitus, type 2     Essential hypertension     HCC (hepatocellular carcinoma)     Hepatic encephalopathy 08/13/2024    Obesity     Portal vein thrombosis 08/03/2024    Pulmonary embolism 01/26/2024    Sebaceous cyst 11/17/2021

## 2024-08-18 NOTE — CLINICAL REVIEW
RAPID RESPONSE NURSE CHART REVIEW        Chart Reviewed: 08/18/2024, 7:11 AM    MRN: [id number]  Bed: 40938/89187 A    Dx: Sepsis with encephalopathy without septic shock    Howard G Sistrunk has a past medical history of Atrial thrombosis, Diabetes mellitus, type 2, Essential hypertension, HCC (hepatocellular carcinoma), Hepatic encephalopathy, Obesity, Portal vein thrombosis, Pulmonary embolism, and Sebaceous cyst.    Last VS: BP (!) 142/79 (BP Location: Left arm, Patient Position: Lying)   Pulse 91   Temp 97.6 °F (36.4 °C) (Oral)   Resp 18   Ht 6' (1.829 m)   Wt 117 kg (257 lb 15 oz)   SpO2 95%   BMI 34.98 kg/m²     24H Vital Sign Range:  Temp:  [97.6 °F (36.4 °C)-98.3 °F (36.8 °C)]   Pulse:  []   Resp:  [17-18]   BP: (137-177)/(76-92)   SpO2:  [95 %-98 %]     Level of Consciousness (AVPU): responds to voice    Recent Labs     08/15/24  1034 08/16/24  0326 08/17/24 2012 08/17/24  2041   WBC 10.91  10.91 9.82 21.46*  --    HGB 11.3*  11.3* 11.1* 11.7*  --    HCT 37.6*  37.6* 35.7* 39.5* 30*   PLT 72*  72* 63* 107*  --        Recent Labs     08/16/24  0326 08/17/24 2012    133*   K 4.2 5.0    101   CO2 23 19*   BUN 36* 37*   CREATININE 1.3 1.8*   GLU 99 141*   MG  --  2.1        Recent Labs     08/17/24  2041   PH 7.383   PCO2 30.3*   PO2 50   HCO3 18.1*   POCSATURATED 85   BE -7*          MEWS score: 2    Charge Agata RUIZ contacted for AI alert within 24 hours reports vital signs currently stable, AM labs pending. No additional concerns verbalized at this time. Instructed to call [phone] for further concerns or assistance.    Keesha Ratliff RN

## 2024-08-18 NOTE — DISCHARGE SUMMARY
Diallo Jimenez - Telemetry [facility] Medicine  Discharge Summary      Patient Name: Howard G Sistrunk  MRN: [id number]  EDWARD: [id number]  Patient Class: IP- Inpatient  Admission Date: 8/14/2024  Hospital Length of Stay: 2 days  Discharge Date and Time: 8/16/2024  5:51 PM  Attending Physician: Randee att. providers found   Discharging Provider: Rafaela Devine MD  Primary Care Provider: Willard España MD  Hospital Medicine Team: [facility] [place] HOSP MED X Rafaela Devine MD  Primary Care Team: [facility] [place] HOSP MED X    HPI:   Howard G Sistrunk is a 74 year old white man with hypertension, diabetes mellitus type 2, hypothyroidism, obesity, hepatocellular carcinoma, pulmonary embolism, portal vein thrombosis, atrial thrombosis (anticoagulated on enoxaparin), iron deficiency anemia. He lives in [place], [place]. He is . His primary care physician is Willard España.     He presented to [facility] Emergency Department on 8/13/2024 with altered mental status, abdominal pain and distension. Labs showed elevated ammonia (116 umol/L), leukocytosis (WBC 41406/uL from 8610 on 8/6/2024), elevated creatinine (1.44 mg/dL from 1.1), elevated INR (1.7). he was given lactulose. He had an upcoming appointment in [facility] Hematology-Oncology clinic on 8/19/2024 for his hepatocellular carcinoma. His case was discussed with Hematology-Oncology at [facility]. He was transferred to [facility] on 8/14/2024 for Hematology-Oncology evaluation. He was admitted to Hospital Medicine Team L.     * No surgery found *      Hospital Course:   Howard Sistrunk is a 74M with fatty liver, recent pulmonary embolism on anticoagulation, recent diagnosis of HCC (based on CT on 8/6/24, no biopsy), diabetes and neuropathy.     He was admitted due to encephalopathy from OSH. He presented with ammonia of 116 and INR of 1.7, TB of 1.7, Platelets of 79k. We treated him with rifaximin and  "lactulose and his mentation improved.     [place] [facility] [place] by Heme-Onc. He was recently discussed at transplant meeting on 8/13  noting "Very large lesion, 14 cm, with HV tumor thrombus involvement, extending up, at least to the inferior cava-atrial junction; not actually in the right atrium. Looks like portal involvement, as well. There are a couple of satellite lesions (1.9 cm)". Committee recommended systemic therapy with possible LRT by IR.  He was evaluated by oncology service and no inpatient treatment recommended and was and was suggested to follow up on upcoming appointment outpatient on 8/19.     His encephalopathy improved with lactulose and rifaximin. Neither were home medications. Arranged to discharge on lactulose and discuss rifaximin with pharmacy for approval.     Was also given lasix and spironolactone for ascites    Platelets noted to be 79k on 8/13 in the setting of heparin exposure on 8/3. 4T score is low to intermediate and HIT Ab sent. Thought by Heme-Onc unlikely to be HIT however recommended non-heparin anticoagulation with argatroban vs DOAC. Eliquis was restarted.     Curbside discussion with hepatology and recommendation to continue Onc follow up and preponing 9/26 outpatient appointment with Hepatology.     He had transiently required oxygen which improved after lasix and spironolactone. Paracentesis attempted however no safe pocket could be identified. Walk test was completed and saturation dropped on exertion and hence oxygen arranged for 3L at exertion. Also assessed by PT and recommendation for HHPT with walker. HH arranged.      Review of Systems   Constitutional:  Positive for fatigue. Negative for fever.   Respiratory:  Negative for shortness of breath.    Cardiovascular:  Positive for leg swelling. Negative for chest pain.   Gastrointestinal:  Positive for abdominal distention. Negative for abdominal pain, constipation and diarrhea.   Genitourinary:  " Negative for difficulty urinating and flank pain.   Neurological:  Negative for dizziness and numbness.   Psychiatric/Behavioral:  Negative for confusion and decreased concentration.      Objective:     Vital Signs (Most Recent):  Temp: 97.2 °F (36.2 °C) (08/16/24 1617)  Pulse: 87 (08/16/24 1617)  Resp: 18 (08/16/24 1617)  BP: (!) 174/95 (08/16/24 1617)  SpO2: 99 % (08/16/24 1617) Vital Signs (24h Range):  Temp:  [98.1 °F (36.7 °C)-98.3 °F (36.8 °C)] 98.3 °F (36.8 °C)  Pulse:  [] 111  Resp:  [17-18] 18  SpO2:  [95 %-98 %] 98 %  BP: (137-177)/(76-92) 142/92     Weight: 113.8 kg (250 lb 14.1 oz)  Body mass index is 34.03 kg/m².     Physical Exam  Vitals and nursing note reviewed.   Constitutional:       General: He is not in acute distress.     Appearance: He is well-developed. He is obese. He is ill-appearing. He is not diaphoretic.   Cardiovascular:      Rate and Rhythm: Normal rate and regular rhythm.      Heart sounds: Normal heart sounds. No murmur heard.  Pulmonary:      Effort: Pulmonary effort is normal. No accessory muscle usage or respiratory distress. He is not intubated.   Abdominal:      General: There is distension.      Palpations: Abdomen is soft.      Tenderness: There is no abdominal tenderness.   Musculoskeletal:      Right lower leg: Edema present.      Left lower leg: Edema present.   Skin:     General: Skin is warm and dry.      Coloration: Skin is not jaundiced or pale.   Neurological:      Mental Status: He is oriented to person, place, and time. Mental status is at baseline.      Motor: No seizure activity.   Psychiatric:         Mood and Affect: Mood normal.         Behavior: Behavior is not aggressive or combative.         Cognition and Memory: Cognition is not impaired.      Goals of Care Treatment Preferences:  Code Status: Full Code         Consults:   Consults (From admission, onward)          Status Ordering Provider     Inpatient consult to Hematology/Oncology  Once         Provider:  (Not yet assigned)    Completed VIRIDIANA MARTÍNEZ            Cardiac/Vascular  Atrial thrombosis  Thrombus not actually in atrial however at cavoatrial junction   Anticoagulated on enoxaparin.  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 Will switch Lovenox to Eliquis.    Hematology  Thrombocytopenia  The likely etiology of thrombocytopenia is medication induced, the suspected medication(s) is/are Lovenox/Heparin . The patients 3 most recent labs are listed below.  Recent Labs     08/15/24  1034 08/16/24  0326 08/17/24 2012   PLT 72*  72* 63* 107*       Plan  - Platelets noted to be 79k on 8/13 in the setting of heparin exposure on 8/3. No new thromboses or skin necrosis despite lovenox exposure yesterday. 4T score is low to intermediate and HIT Ab has been sent  - Switched to a non-heparin anticoagulant such as argatroban or even a DOAC          History of pulmonary embolism  Recent diagnosis   Anticoagulated on enoxaparin In anticipation of biopsy.  Switched to DOAC 8/16 due to concerns for HIT and no anticipated plans for biopsy      Oncology  Hepatocellular carcinoma  Transferred here for Hematology-Oncology evaluation     -newly diagnosed HCC (based on CT on 8/6/24, no biopsy)  -He was recommended for follow-up with oncology on 8/19   - Transplant meeting on 8/13 recommended systemic therapy with possible LRT by IR   - No indication for inpatient treatment    - CT CAP on 8/6 confirmed diagnoses of HCC, there were also bilateral pulmonary nodules up to 0.8cm concerning for mets   - Patient has follow-up with oncology on 8/19, recommended to keep outpatient follow up     GI  * Hepatic encephalopathy  Mentation at baseline now   Will arrange lactulose on discharge and check with pharmacy regarding rifaximin     Ascites  Small volume seen on recent imaging but abdomen seems distended.   IR could not find safe pocket for paracentesis on 8/15      Final Active Diagnoses:    Diagnosis Date Noted POA     PRINCIPAL PROBLEM:  Hepatic encephalopathy [K76.82] 08/13/2024 Yes    ELAINE (acute kidney injury) [N17.9] 08/14/2024 Yes    Ascites [R18.8] 08/14/2024 Yes    Thrombocytopenia [D69.6] 08/14/2024 Yes    Type 2 diabetes mellitus, without long-term current use of insulin [E11.9]  Yes     Chronic    Essential hypertension [I10] 08/05/2024 Yes     Chronic    Hepatocellular carcinoma [C22.0] 08/03/2024 Yes     Chronic    Atrial thrombosis [I51.3] 08/03/2024 Yes     Chronic    PVT (portal vein thrombosis) [I81] 08/03/2024 Yes     Chronic    History of pulmonary embolism [Z86.711] 01/26/2024 Yes     Chronic    Hypothyroidism [E03.9] 01/03/2022 Yes     Chronic      Problems Resolved During this Admission:       Discharged Condition: stable    Disposition: Home-Health Care [facility]    Follow Up:   Follow-up Information       Willard España MD Follow up on 8/21/2024.    Specialty: Internal Medicine  Why: Established [facility] f/u visit @ 10:30am. Please bring discharge summary, ID, insurance card, and medication list.  Contact information:  [address] [place] [id number] [phone]                           Patient Instructions:      WALKER FOR HOME USE     Order Specific Question Answer Comments   Type of Walker: Rollator with brakes and/or seat    With wheels? Yes    Height: 6 foot    Weight: 113.8 kg (250 lb 14.1 oz)    Length of need (1-99 months): 12    Does patient have medical equipment at home? none    Please check all that apply: Patient's condition impairs ambulation.    Please check all that apply: Patient is unable to safely ambulate without equipment.    Please check all that apply: Patient needs help to get in and out of chair.    Please check all that apply: Walker will be used for gait training.      OXYGEN FOR HOME USE     Order Specific Question Answer Comments   Liter Flow 3    Duration With activity    Qualifying Test Performed at: Activity    Oxygen saturation at rest 97    Oxygen saturation  with activity 84    Oxygen saturation with activity on oxygen 95    Portable mode: pulse dose acceptable    Mode: Portable concentrator    Route nasal cannula    Device: home concentrator with portable tanks    Length of need (in months): 12 mos    Patient condition with qualifying saturation Other - List qualifying diagnosis and code    Select a diagnosis & list the code in the comments Decompensated hepatic cirrhosis [8524828]    Select a diagnosis & list the code in the comments HCC (hepatocellular carcinoma) [[id number]]    Select a diagnosis & list the code in the comments Pulmonary embolism [[id number]]    Select a diagnosis & list the code in the comments 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 Yes      Comprehensive metabolic panel   Standing Status: Future Standing Exp. Date: 11/14/25     Order Specific Question Answer Comments   Send normal result to authorizing provider's In Basket if patient is active on MyChart: Yes      CBC auto differential   Standing Status: Future Standing Exp. Date: 11/14/25     Order Specific Question Answer Comments   Send normal result to authorizing provider's In Basket if patient is active on MyChart: Yes      Diet Cardiac   Scheduling Instructions: Low salt 2g, keep fluid intake < 1.5L     Notify your health care provider if you experience any of the following:  temperature >100.4     Notify your health care provider if you experience any of the following:  increased confusion or weakness     Notify your health care provider if you experience any of the following:  persistent dizziness, light-headedness, or visual disturbances     Notify your health care provider if you experience any of the following:  difficulty breathing or increased cough     Activity as tolerated       Significant Diagnostic Studies: Labs: CMP   Recent  Labs   Lab 08/16/24  0326      K 4.2      CO2 23   GLU 99   BUN 36*   CREATININE 1.3   CALCIUM 9.0   PROT 6.9   ALBUMIN 2.6*   BILITOT 1.1*   ALKPHOS 330*   *   *   ANIONGAP 11    and CBC   Recent Labs   Lab 08/16/24  0326   WBC 9.82   HGB 11.1*   HCT 35.7*   PLT 63*       Pending Diagnostic Studies:       Procedure Component Value Units Date/Time    Urinalysis, Reflex to Urine Culture Urine, Clean Catch [[id number]] Collected: 08/14/24 1551    Order Status: Sent Lab Status: In process Updated: 08/14/24 1551    Specimen: Urine            Medications:  Reconciled Home Medications:      Medication List        START taking these medications      CONSTULOSE 10 gram/15 mL solution  Generic drug: lactulose  Take 30 mLs (20 g total) by mouth 3 (three) times daily. TITRATE TO 3-4 BOWEL MOVEMENTS DAILY.     ELIQUIS 5 mg Tab  Generic drug: apixaban  Take 1 tablet (5 mg total) by mouth 2 (two) times daily.  Notes to patient: REPLACES LOVENOX            CONTINUE taking these medications      albuterol 90 mcg/actuation inhaler  Commonly known as: PROVENTIL/VENTOLIN HFA  Inhale 2 puffs into the lungs every 6 (six) hours as needed for Wheezing. Rescue     DULoxetine 30 MG capsule  Commonly known as: CYMBALTA  Take 30 mg by mouth nightly.     furosemide 40 MG tablet  Commonly known as: LASIX  Take 40 mg by mouth 2 (two) times daily.     gabapentin 800 MG tablet  Commonly known as: NEURONTIN  Take 1,600 mg by mouth 2 (two) times daily.     HYDROcodone-acetaminophen  mg per tablet  Commonly known as: NORCO  Take 1 tablet by mouth every 6 (six) hours as needed for Pain.     hydrocortisone 2.5 % cream  Apply topically 2 (two) times daily.     levothyroxine 200 MCG tablet  Commonly known as: SYNTHROID  Take 200 mcg by mouth before breakfast.     metFORMIN 1000 MG tablet  Commonly known as: GLUCOPHAGE  Take 1,000 mg by mouth 2 (two) times daily with meals.     MIRAPEX ORAL  Take 1 mg by mouth every  evening.     spironolactone 100 MG tablet  Commonly known as: ALDACTONE  Take 1 tablet (100 mg total) by mouth once daily.            STOP taking these medications      enoxaparin 120 mg/0.8 mL Syrg  Commonly known as: LOVENOX              Indwelling Lines/Drains at time of discharge:   Lines/Drains/Airways       None                   Time spent on the discharge of patient: 60 minutes         Rafaela Devine MD  Department of Hospital Medicine  [facility] - Telemetry Stepdown

## 2024-08-19 ENCOUNTER — PATIENT OUTREACH (OUTPATIENT)
Dept: ADMINISTRATIVE | Facility: CLINIC | Age: 74
End: 2024-08-19
Payer: MEDICARE

## 2024-08-19 PROBLEM — I48.0 PAF (PAROXYSMAL ATRIAL FIBRILLATION): Status: ACTIVE | Noted: 2024-08-19

## 2024-08-19 LAB
ALBUMIN SERPL BCP-MCNC: 2.3 G/DL (ref 3.5–5.2)
ALP SERPL-CCNC: 424 U/L (ref 55–135)
ALT SERPL W/O P-5'-P-CCNC: 569 U/L (ref 10–44)
ANION GAP SERPL CALC-SCNC: 10 MMOL/L (ref 8–16)
AST SERPL-CCNC: 557 U/L (ref 10–40)
BASOPHILS # BLD AUTO: 0.03 K/UL (ref 0–0.2)
BASOPHILS NFR BLD: 0.2 % (ref 0–1.9)
BILIRUB SERPL-MCNC: 1.8 MG/DL (ref 0.1–1)
BNP SERPL-MCNC: 183 PG/ML (ref 0–99)
BUN SERPL-MCNC: 32 MG/DL (ref 8–23)
CALCIUM SERPL-MCNC: 8.5 MG/DL (ref 8.7–10.5)
CHLORIDE SERPL-SCNC: 101 MMOL/L (ref 95–110)
CO2 SERPL-SCNC: 18 MMOL/L (ref 23–29)
CREAT SERPL-MCNC: 1.3 MG/DL (ref 0.5–1.4)
CREAT UR-MCNC: 47 MG/DL (ref 23–375)
DIFFERENTIAL METHOD BLD: ABNORMAL
EOSINOPHIL # BLD AUTO: 0 K/UL (ref 0–0.5)
EOSINOPHIL NFR BLD: 0.2 % (ref 0–8)
ERYTHROCYTE [DISTWIDTH] IN BLOOD BY AUTOMATED COUNT: 15.7 % (ref 11.5–14.5)
EST. GFR  (NO RACE VARIABLE): 58 ML/MIN/1.73 M^2
GLUCOSE SERPL-MCNC: 123 MG/DL (ref 70–110)
HCT VFR BLD AUTO: 36.4 % (ref 40–54)
HGB BLD-MCNC: 10.8 G/DL (ref 14–18)
IMM GRANULOCYTES # BLD AUTO: 0.09 K/UL (ref 0–0.04)
IMM GRANULOCYTES NFR BLD AUTO: 0.6 % (ref 0–0.5)
INR PPP: 1.6 (ref 0.8–1.2)
LACTATE SERPL-SCNC: 1.8 MMOL/L (ref 0.5–2.2)
LYMPHOCYTES # BLD AUTO: 1.5 K/UL (ref 1–4.8)
LYMPHOCYTES NFR BLD: 9.3 % (ref 18–48)
MAGNESIUM SERPL-MCNC: 2 MG/DL (ref 1.6–2.6)
MCH RBC QN AUTO: 23.7 PG (ref 27–31)
MCHC RBC AUTO-ENTMCNC: 29.7 G/DL (ref 32–36)
MCV RBC AUTO: 80 FL (ref 82–98)
MONOCYTES # BLD AUTO: 1.8 K/UL (ref 0.3–1)
MONOCYTES NFR BLD: 11 % (ref 4–15)
NEUTROPHILS # BLD AUTO: 12.6 K/UL (ref 1.8–7.7)
NEUTROPHILS NFR BLD: 78.7 % (ref 38–73)
NRBC BLD-RTO: 0 /100 WBC
OSMOLALITY SERPL: 295 MOSM/KG (ref 280–300)
OSMOLALITY UR: 350 MOSM/KG (ref 50–1200)
PHOSPHATE SERPL-MCNC: 2.6 MG/DL (ref 2.7–4.5)
PLATELET # BLD AUTO: 152 K/UL (ref 150–450)
PMV BLD AUTO: 11.4 FL (ref 9.2–12.9)
POCT GLUCOSE: 117 MG/DL (ref 70–110)
POCT GLUCOSE: 131 MG/DL (ref 70–110)
POCT GLUCOSE: 134 MG/DL (ref 70–110)
POCT GLUCOSE: 187 MG/DL (ref 70–110)
POTASSIUM SERPL-SCNC: 4.3 MMOL/L (ref 3.5–5.1)
PROT SERPL-MCNC: 6.5 G/DL (ref 6–8.4)
PROTHROMBIN TIME: 17.3 SEC (ref 9–12.5)
RBC # BLD AUTO: 4.56 M/UL (ref 4.6–6.2)
SODIUM SERPL-SCNC: 129 MMOL/L (ref 136–145)
SODIUM UR-SCNC: 57 MMOL/L (ref 20–250)
UUN UR-MCNC: 428 MG/DL (ref 140–1050)
VANCOMYCIN TROUGH SERPL-MCNC: 8.8 UG/ML (ref 10–22)
WBC # BLD AUTO: 16.05 K/UL (ref 3.9–12.7)

## 2024-08-19 PROCEDURE — 80202 ASSAY OF VANCOMYCIN: CPT | Performed by: INTERNAL MEDICINE

## 2024-08-19 PROCEDURE — 83930 ASSAY OF BLOOD OSMOLALITY: CPT | Performed by: INTERNAL MEDICINE

## 2024-08-19 PROCEDURE — 94761 N-INVAS EAR/PLS OXIMETRY MLT: CPT

## 2024-08-19 PROCEDURE — 85610 PROTHROMBIN TIME: CPT | Performed by: HOSPITALIST

## 2024-08-19 PROCEDURE — 82570 ASSAY OF URINE CREATININE: CPT | Performed by: INTERNAL MEDICINE

## 2024-08-19 PROCEDURE — 25000003 PHARM REV CODE 250: Performed by: INTERNAL MEDICINE

## 2024-08-19 PROCEDURE — 83935 ASSAY OF URINE OSMOLALITY: CPT | Performed by: INTERNAL MEDICINE

## 2024-08-19 PROCEDURE — 83605 ASSAY OF LACTIC ACID: CPT | Performed by: INTERNAL MEDICINE

## 2024-08-19 PROCEDURE — 27000221 HC OXYGEN, UP TO 24 HOURS

## 2024-08-19 PROCEDURE — 63600175 PHARM REV CODE 636 W HCPCS: Performed by: HOSPITALIST

## 2024-08-19 PROCEDURE — 36415 COLL VENOUS BLD VENIPUNCTURE: CPT | Mod: XB | Performed by: INTERNAL MEDICINE

## 2024-08-19 PROCEDURE — 99900035 HC TECH TIME PER 15 MIN (STAT)

## 2024-08-19 PROCEDURE — 20600001 HC STEP DOWN PRIVATE ROOM

## 2024-08-19 PROCEDURE — 83735 ASSAY OF MAGNESIUM: CPT | Performed by: HOSPITALIST

## 2024-08-19 PROCEDURE — 83880 ASSAY OF NATRIURETIC PEPTIDE: CPT | Performed by: HOSPITALIST

## 2024-08-19 PROCEDURE — 84300 ASSAY OF URINE SODIUM: CPT | Performed by: INTERNAL MEDICINE

## 2024-08-19 PROCEDURE — 80053 COMPREHEN METABOLIC PANEL: CPT | Performed by: HOSPITALIST

## 2024-08-19 PROCEDURE — 84100 ASSAY OF PHOSPHORUS: CPT | Performed by: HOSPITALIST

## 2024-08-19 PROCEDURE — 25000242 PHARM REV CODE 250 ALT 637 W/ HCPCS: Performed by: INTERNAL MEDICINE

## 2024-08-19 PROCEDURE — 25000242 PHARM REV CODE 250 ALT 637 W/ HCPCS: Performed by: HOSPITALIST

## 2024-08-19 PROCEDURE — 94640 AIRWAY INHALATION TREATMENT: CPT

## 2024-08-19 PROCEDURE — 84540 ASSAY OF URINE/UREA-N: CPT | Performed by: INTERNAL MEDICINE

## 2024-08-19 PROCEDURE — 25000003 PHARM REV CODE 250: Performed by: HOSPITALIST

## 2024-08-19 PROCEDURE — 85025 COMPLETE CBC W/AUTO DIFF WBC: CPT | Performed by: HOSPITALIST

## 2024-08-19 RX ORDER — IPRATROPIUM BROMIDE AND ALBUTEROL SULFATE 2.5; .5 MG/3ML; MG/3ML
3 SOLUTION RESPIRATORY (INHALATION) EVERY 6 HOURS PRN
Status: DISCONTINUED | OUTPATIENT
Start: 2024-08-19 | End: 2024-08-20 | Stop reason: HOSPADM

## 2024-08-19 RX ORDER — FUROSEMIDE 10 MG/ML
60 INJECTION INTRAMUSCULAR; INTRAVENOUS ONCE
Status: COMPLETED | OUTPATIENT
Start: 2024-08-19 | End: 2024-08-19

## 2024-08-19 RX ORDER — HYDROCORTISONE 25 MG/G
CREAM TOPICAL 2 TIMES DAILY
Status: DISCONTINUED | OUTPATIENT
Start: 2024-08-19 | End: 2024-08-20 | Stop reason: HOSPADM

## 2024-08-19 RX ORDER — METOPROLOL TARTRATE 25 MG/1
25 TABLET, FILM COATED ORAL 2 TIMES DAILY
Status: DISCONTINUED | OUTPATIENT
Start: 2024-08-19 | End: 2024-08-20 | Stop reason: HOSPADM

## 2024-08-19 RX ADMIN — RIFAXIMIN 550 MG: 550 TABLET ORAL at 08:08

## 2024-08-19 RX ADMIN — LACTULOSE 30 G: 20 SOLUTION ORAL at 08:08

## 2024-08-19 RX ADMIN — ACETAMINOPHEN 650 MG: 325 TABLET ORAL at 10:08

## 2024-08-19 RX ADMIN — METOPROLOL TARTRATE 25 MG: 25 TABLET, FILM COATED ORAL at 12:08

## 2024-08-19 RX ADMIN — LEVOTHYROXINE SODIUM 200 MCG: 100 TABLET ORAL at 04:08

## 2024-08-19 RX ADMIN — FUROSEMIDE 60 MG: 10 INJECTION, SOLUTION INTRAMUSCULAR; INTRAVENOUS at 12:08

## 2024-08-19 RX ADMIN — ALBUTEROL SULFATE 2 PUFF: 108 INHALANT RESPIRATORY (INHALATION) at 12:08

## 2024-08-19 RX ADMIN — METOPROLOL TARTRATE 25 MG: 25 TABLET, FILM COATED ORAL at 09:08

## 2024-08-19 RX ADMIN — RIFAXIMIN 550 MG: 550 TABLET ORAL at 09:08

## 2024-08-19 RX ADMIN — APIXABAN 5 MG: 5 TABLET, FILM COATED ORAL at 08:08

## 2024-08-19 RX ADMIN — HYDROCORTISONE: 25 CREAM TOPICAL at 11:08

## 2024-08-19 RX ADMIN — APIXABAN 5 MG: 5 TABLET, FILM COATED ORAL at 09:08

## 2024-08-19 RX ADMIN — IPRATROPIUM BROMIDE AND ALBUTEROL SULFATE 3 ML: 2.5; .5 SOLUTION RESPIRATORY (INHALATION) at 12:08

## 2024-08-19 RX ADMIN — HYDROCORTISONE: 25 CREAM TOPICAL at 09:08

## 2024-08-19 RX ADMIN — CEFTRIAXONE 2 G: 2 INJECTION, POWDER, FOR SOLUTION INTRAMUSCULAR; INTRAVENOUS at 10:08

## 2024-08-19 NOTE — ASSESSMENT & PLAN NOTE
Patient's FSGs are controlled on current medication regimen. Patient on oral Metformin at home to treat and hold in hospital.  Last A1c reviewed-   Lab Results   Component Value Date    HGBA1C 6.9 (H) 08/14/2024     Most recent fingerstick glucose reviewed-   Recent Labs   Lab 08/18/24 2029 08/19/24  0829   POCTGLUCOSE 160* 134*       Current correctional scale  Low  Maintain anti-hyperglycemic dose as follows-   Antihyperglycemics (From admission, onward)    Start     Stop Route Frequency Ordered    08/17/24 2150  insulin aspart U-100 pen 0-5 Units         -- SubQ Before meals & nightly PRN 08/17/24 2151        Hold Oral hypoglycemics while patient is in the hospital.  Monitor blood sugars with meals and at bedtime in hospital.  Diabetic diet.  Target blood sugars 140-180 in hospital.

## 2024-08-19 NOTE — PROGRESS NOTES
[facility] Medicine  Progress Note    Patient Name: Howard G Sistrunk  MRN: [id number]  Patient Class: IP- Inpatient   Admission Date: 8/17/2024  Length of Stay: 0 days  Attending Physician: Iesha Stahl MD  Primary Care Provider: Willard España MD        Subjective:     Principal Problem:Sepsis with encephalopathy without septic shock        HPI:    74-year-old man with history of type 2 diabetes, hypertension, HCC, PE, portal vein thrombosis on anticoagulation presents to the emergency department after unwitnessed fall and concern for mental status change.      He was discharged from [facility] hospital medicine yesterday, during his recent admission but there were concerns for hepatic encephalopathy exacerbation, acute thrombocytopenia requiring Hematology evaluation and recent diagnosis of HCC.  He also was treated for ELAINE.  He was discharged yesterday and at bedside his son is present who reports that patient was doing well when he got home able to get out of the car on his own power ambulating without assistance, made himself a sandwich.  Patient's wife and son felt he was doing well.  Son lives nearby and was called by a step mom this morning after patient had an unwitnessed fall at home.  The patient could not get up under his own power and even with son's assistance he had difficulty.  Son noted that he seemed overall more confused and had trouble remembering events of the day.      In ED workup he is found with an acute leukocytosis of 21 yesterday white count was 9.8 his platelet count is up trending today is 107 from 63 no acute signs of bleeding.  Patient did take lactulose yesterday unclear when his last bowel movement was, today ammonia is 88.  Creatinine is also up trending today.  He is referred for admission    Overview/Hospital Course:  75 y/o with recent diagnosis of HCC, portal vein thrombosis due to HCC, h/o PE, atrial thrombus on Apixiban to  treat - switched recently from Lovenox to Apixiban on recent hospital stay due to thrombocytopenia and concern for HIT but HIT was negative with recent [facility] admit for hepatic encephalopathy and discharged after resolution on Lactulose and Rifaximin and returned < 24 hours later on 8/17 and readmitted with generalized weakness and fatigue. Concern for infection as WBC 21,000 and doubled in less than 1 day  as 10,000 on discharge on 8/16. Also has new ELAINE with Creatinine 1.8 and was 1.3 on 8/16. U/A done and did show show 19 WBC and occasional bacteria so possible source of infection. CXR done and no evidence of pneumonia noted. Urine and blood cultures sent. IR attempted US paracentesis on 8/16 on day of recent hospital discharge but not enough ascitic fluid to tap so doubt SBP as cause of infection. Patient given IVF's as per sepsis protocol. Patient started on IV Vancomycin and Rocephin. Creatinine better today and down to 1.4. WBC improving. Not encephalopathic on admit. CT head and cervical spine on admit unremarkable. No signs to suggest bleeding as Hgb stable. Platelets improved from last admit and up to 104,000 and was 63,000 on discharge on 8/16. Patient has not seen Oncology yet for his HCC and due to see them outpatient on 8/19 so will need to reschedule as in hospital to discuss potential treatment options. Not liver transplant candidate per Hepatology as large tumor and and has PVT involvement. LFT's eleated on amdit but likely related to his known HCC.     Interval History: 75 y/o with recent diagnosis of HCC, portal vein thrombosis due to HCC, h/o PE, atrial thrombus on Apixiban to treat - switched recently from Lovenox to Apixiban on recent hospital stay due to thrombocytopenia and concern for HIT but HIT was negative with recent [facility] admit for hepatic encephalopathy and discharged after resolution on Lactulose and Rifaximin and returned < 24 hours later on 8/17 and readmitted with generalized weakness  and fatigue. Concern for infection as WBC 21,000 and doubled in less than 1 day  as 10,000 on discharge on 8/16. Also has new ELAINE with Creatinine 1.8 and was 1.3 on 8/16. U/A done and did show show 19 WBC and occasional bacteria so possible source of infection. CXR done and no evidence of pneumonia noted. Urine and blood cultures sent. IR attempted US paracentesis on 8/16 on day of recent hospital discharge but not enough ascitic fluid to tap so doubt SBP as cause of infection. Patient given IVF's as per sepsis protocol. Patient started on IV Vancomycin and Rocephin. Creatinine better today and down to 1.4. WBC improving. Not encephalopathic on admit. CT head and cervical spine on admit unremarkable. No signs to suggest bleeding as Hgb stable. Platelets improved from last admit and up to 104,000 and was 63,000 on discharge on 8/16. Patient has not seen Oncology yet for his HCC and due to see them outpatient on 8/19 so will need to reschedule as in hospital to discuss potential treatment options. Not liver transplant candidate per Hepatology as large tumor and and has PVT involvement. LFT's elevated on admit but likely related to his known HCC.   Patient not very interactive on exam today and I mostly spoke with his wife who was at bedside on rounds today. Wife reports when patient got home from discharge on 8/16 he was feeling good and walking around house and even made himself a sandwich and ate it. She stated around 10:00 pm he went to bed as he stated he was tired and throughout night she would wake up as sleeping in a separate room and check on him and he seemed fine until yesterday morning when she went to check on him and he was lying on the ground next to the bed. He was awake but told her he was oo weak to get up on his own and despite help was too weak to get up so she became concerned and reason they came back to ED yesterday.   Labs reviewed today and WBC is improved from yesterday and down to 16,900 from  21,4000 yesterday. Creatinine improved with IVF's and down to 1.4 today from 1.8 yesterday. Will give 1 liter of NS again today. Sodium 131 and was 133 yesterday so will need to watch and monitor. Possible related to hypovolemia and sepsis and will need to watch closely. Platelets stable at 104,000 and was 107,000 yesterday. Hgb stable at 10.9 with no clinical signs of bleeding. Holding home diuretics due to ELAINE.     Review of Systems   Constitutional:  Positive for fatigue. Negative for fever.   Respiratory:  Negative for cough and shortness of breath.    Cardiovascular:  Negative for chest pain and leg swelling.   Gastrointestinal:  Negative for abdominal pain, blood in stool, diarrhea, nausea and vomiting.   Musculoskeletal:  Negative for arthralgias.   Neurological:  Positive for weakness (Generalized). Negative for dizziness.   Psychiatric/Behavioral:  Negative for agitation and confusion.      Objective:     Vital Signs (Most Recent):  Temp: 98.2 °F (36.8 °C) (08/18/24 1600)  Pulse: 107 (08/18/24 1709)  Resp: 16 (08/18/24 1709)  BP: 160/70 (08/18/24 1600)  SpO2: 93 % (08/18/24 1709) on room air  Vital Signs (24h Range):  Temp:  [97.6 °F (36.4 °C)-98.8 °F (37.1 °C)] 98.2 °F (36.8 °C)  Pulse:  [] 107  Resp:  [17-22] 22  SpO2:  [93 %-98 %] 93 %  BP: (142-177)/(70-92) 160/70     Weight: 117 kg (257 lb 15 oz)  Body mass index is 34.98 kg/m².    Intake/Output Summary (Last 24 hours) at 8/18/2024 1854  Last data filed at 8/18/2024 1420  Gross per 24 hour   Intake 2814.18 ml   Output 200 ml   Net 2614.18 ml         Physical Exam  Vitals and nursing note reviewed.   Constitutional:       General: He is not in acute distress.     Appearance: He is obese. He is ill-appearing.      Comments: Patient very tired and fatigue appearing on exam. Wife at bedside. Patient very ill appearing.   Eyes:      General: No scleral icterus.  Neck:      Vascular: No JVD.   Cardiovascular:      Rate and Rhythm: Normal rate and  regular rhythm.      Heart sounds: Normal heart sounds. No murmur heard.  Pulmonary:      Effort: Pulmonary effort is normal. No respiratory distress.      Breath sounds: Normal breath sounds. No wheezing.   Abdominal:      General: Abdomen is flat. Bowel sounds are normal. There is no distension.      Palpations: Abdomen is soft.      Tenderness: There is no abdominal tenderness. There is no guarding.   Musculoskeletal:      Cervical back: Neck supple.      Right lower leg: No edema.      Left lower leg: No edema.   Skin:     Coloration: Skin is not jaundiced.      Findings: No erythema or rash.   Neurological:      General: No focal deficit present.      Mental Status: He is oriented to person, place, and time.   Psychiatric:         Mood and Affect: Mood normal.         Behavior: Behavior normal. Behavior is cooperative.             Significant Labs: Blood Culture:   Recent Labs   Lab 08/17/24 2216   LABBLOO No Growth to date  No Growth to date     CBC:   Recent Labs   Lab 08/17/24 2012 08/17/24  2041 08/18/24  0632   WBC 21.46*  --  16.95*   HGB 11.7*  --  10.9*   HCT 39.5* 30* 36.3*   *  --  104*     CMP:   Recent Labs   Lab 08/17/24 2012 08/18/24  0632   * 131*   K 5.0 4.7    100   CO2 19* 22*   * 97   BUN 37* 35*   CREATININE 1.8* 1.4   CALCIUM 9.1 8.6*   PROT 7.4 6.7   ALBUMIN 2.7* 2.5*   BILITOT 1.7* 1.7*   ALKPHOS 377* 364*   * 504*   * 430*   ANIONGAP 13 9     Coagulation:   Recent Labs   Lab 08/18/24  0632   INR 1.6*     Lactic Acid:   Recent Labs   Lab 08/18/24  0632   LACTATE 2.1     Magnesium:   Recent Labs   Lab 08/17/24 2012 08/18/24  0632   MG 2.1 2.0     Urine Studies:   Recent Labs   Lab 08/17/24 2225   COLORU Yellow   APPEARANCEUA Hazy*   PHUR 6.0   SPECGRAV 1.025   PROTEINUA 2+*   GLUCUA Negative   KETONESU Trace*   BILIRUBINUA Negative   OCCULTUA Negative   NITRITE Negative   LEUKOCYTESUR 1+*   RBCUA 2   WBCUA 19*   BACTERIA Occasional  "  SQUAMEPITHEL 1   HYALINECASTS 10*     Recent Labs     08/17/24  2012   AMMONIA 86*        Significant Imaging: I have reviewed all pertinent imaging results/findings within the past 24 hours.    Assessment/Plan:      * Sepsis with encephalopathy without septic shock  Concern at this time is for sepsis which is causing worsening or exacerbation of hepatic encephalopathy and I suspect also the recurrence of ELAINE.      Source seems most likely to be acute cystitis, patient recently had abdominal ultrasound did not have enough ascites safe to tap, continue empiric antibiotics with IV Vancomycin and Ceftriaxone as strongly suspect urinary source. CXR on admit unremarkable. Blood cultures sent on 8/17 and so far no growth.     Lactic acid elevated 3.3 on admit on 8/17, patient received 1 liter of normal saline and rechecked lactic acid and back to normal at 2.1 on am of 8/18. Give 1 more liter of normal saline today on 8/18.     Antibiotics given-   Antibiotics (72h ago, onward)      Start     Stop Route Frequency Ordered    08/18/24 2330  vancomycin 1,250 mg in D5W 250 mL IVPB (Vial-Mate)         -- IV Every 24 hours (non-standard times) 08/17/24 2229 08/17/24 2300  rifAXIMin tablet 550 mg         -- Oral 2 times daily 08/17/24 2151    08/17/24 2300  cefTRIAXone (ROCEPHIN) 2 g in D5W 100 mL IVPB (MB+)         -- IV Every 24 hours (non-standard times) 08/17/24 2200 08/17/24 2259  vancomycin - pharmacy to dose  (vancomycin IVPB (PEDS and ADULTS))        Placed in "And" Linked Group    -- IV pharmacy to manage frequency 08/17/24 2200          Latest lactate reviewed-  Recent Labs   Lab 08/17/24 2222 08/18/24  0632   LACTATE  --  2.1   POCLAC 3.32*  --      Continue current antibiotics on 8/18 pending culture results.     ELAINE (acute kidney injury)  Improved with IVF's given on admit in ED. Creatinine 1.8 on admit and down to 1.4 on 8/18. Given additional 1 liter of NS on 8/18. Monitor daily BMP and urine output. Hold " home diuretics for now due to ELAINE/sepsis.   ELAINE is likely due to  sepsis/hypovolemia . Baseline creatinine is  1.3 . Most recent creatinine and eGFR are listed below.  Recent Labs     08/16/24  0326 08/17/24 2012 08/18/24  0632   CREATININE 1.3 1.8* 1.4   EGFRNORACEVR 58.0* 39.3* 53.1*        Plan  - ELAINE is improving on 8/18. Continue IVFs.   - Hold home diuretics.   - Monitor serial daily BMPs.  - Renally dose medications based on GFR.  - Avoid nephrotoxic agents and medications.     Hepatic encephalopathy  Doubt cause of his acute decompensation at home but at high risk of hepatic encephalopathy in light of sepsis/infection which is a none precipitant of hepatic encephalopathy. Continue Lactulose dose to 30 g every 8 hours and titrate fro 3-4 BMs a day. Continue Rifaximin to treat HE.    Hold Gabapentin at this time with change in his renal function.    Hepatocellular carcinoma  Patient has not seen Oncology yet for his new diagnosis of HCC and due to see them outpatient on 8/19 so will need to reschedule as in hospital to discuss potential treatment options. Not liver transplant candidate per Hepatology on last admit as large tumor and and has PVT involvement. LFT's elevated on admit but likely related to his known HCC.       Atrial thrombosis  Present on admit and continue home anticoagulation with Apixiban 5 mg po BID.       PVT (portal vein thrombosis)  Present on admit and continue home anticoagulation with Apixiban 5 mg po BID.       History of pulmonary embolism  Present on admit and continue home anticoagulation with Apixiban 5 mg po BID.       Type 2 diabetes mellitus without complication, without long-term current use of insulin  Patient's FSGs are controlled on current medication regimen. Patient on oral Metformin at home to treat and hold in hospital.  Last A1c reviewed-   Lab Results   Component Value Date    HGBA1C 6.9 (H) 08/14/2024     Most recent fingerstick glucose reviewed-   Recent Labs   Lab  08/17/24  2231 08/18/24  0714 08/18/24  1108 08/18/24  1524   POCTGLUCOSE 141* 113* 140* 144*     Current correctional scale  Low  Maintain anti-hyperglycemic dose as follows-   Antihyperglycemics (From admission, onward)      Start     Stop Route Frequency Ordered    08/17/24 2150  insulin aspart U-100 pen 0-5 Units         -- SubQ Before meals & nightly PRN 08/17/24 2151          Hold Oral hypoglycemics while patient is in the hospital.  Monitor blood sugars with meals and at bedtime in hospital.  Diabetic diet.  Target blood sugars 140-180 in hospital.         Acquired hypothyroidism  Chronic and controlled. Continue home Levothyroxine to treat.       Thrombocytopenia  The likely etiology of thrombocytopenia is liver disease. The patients 3 most recent labs are listed below.  Recent Labs     08/16/24  0326 08/17/24 2012 08/18/24  0632   PLT 63* 107* 104*     Plan  - Will transfuse if platelet count is <10k.  - Monitor daily CBC.       Class 1 obesity due to excess calories with serious comorbidity and body mass index (BMI) of 34.0 to 34.9 in adult  Body mass index is 34.98 kg/m². Morbid obesity complicates all aspects of disease management from diagnostic modalities to treatment. Weight loss encouraged and health benefits explained to patient.         Fall  Status post CT head and CT cervical spine without an acute traumatic injury  Fall precautions  When patient is more awake and alert and able to participate would obtain repeat PT OT consultations        VTE Risk Mitigation (From admission, onward)           Ordered     apixaban tablet 5 mg  2 times daily         08/17/24 2151                    Discharge Planning   PARIS: 8/21/2024     Code Status: Full Code   Is the patient medically ready for discharge?:     Reason for patient still in hospital (select all that apply): Patient unstable, Patient new problem, and Patient trending condition  Discharge Plan A: Home with family          Iseha Stahl,  MD  Department of Hospital Medicine   [facility] - [facility]

## 2024-08-19 NOTE — ASSESSMENT & PLAN NOTE
Patient has not seen Oncology yet for his new diagnosis of HCC and due to see them outpatient on 8/19 so will need to reschedule as in hospital to discuss potential treatment options. Not liver transplant candidate per Hepatology on last admit as large tumor and and has PVT involvement. LFT's elevated on admit but likely related to his known HCC.

## 2024-08-19 NOTE — ASSESSMENT & PLAN NOTE
Patient with Paroxysmal (<7 days) atrial fibrillation which is new onset and currently controlled. Plan to start Metoprolol 25 mg po BID for rate control as at times HR up to 160's but very briefly. Recent echo done on 8/5/2024 with normal EF 65% and no diastolic dysfunction an no valvular abnormalities. [id number] Score: 2. Anticoagulation indicated. Anticoagulation done with Apixiban that patient is already on for his atrial thrombus and PVT .

## 2024-08-19 NOTE — ASSESSMENT & PLAN NOTE
Body mass index is 34.98 kg/m². Morbid obesity complicates all aspects of disease management from diagnostic modalities to treatment. Weight loss encouraged and health benefits explained to patient.

## 2024-08-19 NOTE — ASSESSMENT & PLAN NOTE
Body mass index is 35.67 kg/m². Morbid obesity complicates all aspects of disease management from diagnostic modalities to treatment. Weight loss encouraged and health benefits explained to patient.

## 2024-08-19 NOTE — PROGRESS NOTES
[facility] - Transplant [facility]  Progress Note    Patient Name: Howard G Sistrunk  MRN: [id number]  Patient Class: IP- Inpatient   Admission Date: 8/17/2024  Length of Stay: 1 days  Attending Physician: Iesha Stahl MD  Primary Care Provider: Willard España MD        Subjective:     Principal Problem:Sepsis with encephalopathy without septic shock        HPI:    74-year-old man with history of type 2 diabetes, hypertension, HCC, PE, portal vein thrombosis on anticoagulation presents to the emergency department after unwitnessed fall and concern for mental status change.      He was discharged from [facility] hospital medicine yesterday, during his recent admission but there were concerns for hepatic encephalopathy exacerbation, acute thrombocytopenia requiring Hematology evaluation and recent diagnosis of HCC.  He also was treated for ELAINE.  He was discharged yesterday and at bedside his son is present who reports that patient was doing well when he got home able to get out of the car on his own power ambulating without assistance, made himself a sandwich.  Patient's wife and son felt he was doing well.  Son lives nearby and was called by a step mom this morning after patient had an unwitnessed fall at home.  The patient could not get up under his own power and even with son's assistance he had difficulty.  Son noted that he seemed overall more confused and had trouble remembering events of the day.      In ED workup he is found with an acute leukocytosis of 21 yesterday white count was 9.8 his platelet count is up trending today is 107 from 63 no acute signs of bleeding.  Patient did take lactulose yesterday unclear when his last bowel movement was, today ammonia is 88.  Creatinine is also up trending today.  He is referred for admission    Overview/Hospital Course:  75 y/o with recent diagnosis of HCC, portal vein thrombosis due to HCC, h/o PE, atrial thrombus on Apixiban to  treat - switched recently from Lovenox to Apixiban on recent hospital stay due to thrombocytopenia and concern for HIT but HIT was negative with recent [facility] admit for hepatic encephalopathy and discharged after resolution on Lactulose and Rifaximin and returned < 24 hours later on 8/17 and readmitted with generalized weakness and fatigue. Concern for infection as WBC 21,000 and doubled in less than 1 day  as 10,000 on discharge on 8/16. Also has new ELAINE with Creatinine 1.8 and was 1.3 on 8/16. U/A done and did show show 19 WBC and occasional bacteria so possible source of infection. CXR done and no evidence of pneumonia noted. Urine and blood cultures sent. IR attempted US paracentesis on 8/16 on day of recent hospital discharge but not enough ascitic fluid to tap so doubt SBP as cause of infection. Patient given IVF's as per sepsis protocol. Patient started on IV Vancomycin and Rocephin. Creatinine better today and down to 1.4. WBC improving. Not encephalopathic on admit. CT head and cervical spine on admit unremarkable. No signs to suggest bleeding as Hgb stable. Platelets improved from last admit and up to 104,000 and was 63,000 on discharge on 8/16. Patient has not seen Oncology yet for his HCC and due to see them outpatient on 8/19 so will need to reschedule as in hospital to discuss potential treatment options. Not liver transplant candidate per Hepatology as large tumor and and has PVT involvement. LFT's elevated on admit but likely related to his known HCC. Patient having intermittent runs of atrial fibrillation on telemetry with variable ventricular rates and some as high as 160 but at other times < 100 but RVR not sustained and patient with no prior history. Patient already anticoagulated on Apixiban for PVT and atrial thrombus so will continue. Will start Metoprolol 25 mg po BID for rate control. Patient in and out of A. Fib and not persistent so strategy is rate not rhythm control. Patient and recent echo  on 8/5/2024 that showed normal EF 65% and normal diastolic function and no valvular abnormalities. Patient had rapid called on evening of 8/18 for decreasing oxygen sats and placed on 2 liters of oxygen and wheezing. Given IV Lasix and CXR done but showed no pulmonary edema. Patient given Duoneb with improvement and back on room air on am of 8/19. Urine culture returned on 8/19 with 10,000-49,999 with gram negative renato and > 100,000 with Enterococcus species. Patient on IV Ceftriaxone an showed cover GNR and on IV Vancomycin that should cover Enterococcus for now pending final culture results and sensitivities. Patient states he is feeling much stronger and has been getting up and going to bathroom with his family and nursing and doing much better moving around. Patient anxious to discharge but explained need to await results of final urine culture and sensitivities and make sure HR is controlled.     Interval History: Patient having intermittent runs of atrial fibrillation on telemetry since yesterday with variable ventricular rates and some as high as 160 but at other times < 100 but RVR not sustained and patient with no prior history. Patient already anticoagulated on Apixiban for PVT and atrial thrombus so will continue. Will start Metoprolol 25 mg po BID for rate control. Patient in and out of A. Fib and not persistent so strategy is rate not rhythm control. Patient had recent echo on 8/5/2024 that showed normal EF 65% and normal diastolic function and no valvular abnormalities. Patient had rapid called last night for decreasing oxygen sats and placed on 2 liters of oxygen and wheezing. Given IV Lasix and CXR done but showed no pulmonary edema so doubt relate to volume overload. Patient given Duoneb with improvement and back on room air this am and states he is not having any breathing difficulties. Urine culture returned today with 10,000-49,999 with gram negative renato and > 100,000 with Enterococcus species.  Patient on IV Ceftriaxone and should cover GNR and on IV Vancomycin that should cover Enterococcus for now pending final culture results and sensitivities and must await those results prior to making any discharge planning and explained to patient and his family at bedside as patient states he is feeling much better and ready to go home but I explained need to wait for final cultures and sensitivities to make sure send him home on correct antibiotics to treat his infection.  Patient states he is feeling much stronger and has been getting up and going to bathroom with his family and nursing and doing much better moving around. Patient anxious to discharge but explained need to await results of final urine culture and sensitivities and make sure HR is controlled prior to going home but possibly tomorrow. Labs reviewed. Hgb stable at 10.8 and 10.9 yesterday. WBC slightly improved from 16,900 yesterday to 16,000 today. Platelets improved from 104,000 to 152,000 today. Sodium slightly down from 131 yesterday to 129 today but asymptomatic.     Review of Systems   Constitutional:  Negative for fatigue and fever.   Respiratory:  Negative for cough and shortness of breath.    Cardiovascular:  Negative for chest pain, palpitations and leg swelling.   Gastrointestinal:  Negative for abdominal pain, diarrhea, nausea and vomiting.   Musculoskeletal:  Negative for arthralgias.   Neurological:  Negative for dizziness. Weakness: Generalized.  Psychiatric/Behavioral:  Negative for agitation and confusion.      Objective:     Vital Signs (Most Recent):  Temp: 97.4 °F (36.3 °C) (08/19/24 1137)  Pulse: 76 (08/19/24 1459)  Resp: 18 (08/19/24 1137)  BP: 177/81 (08/19/24 1137)  SpO2: 97 % (08/19/24 1137) on room air  Vital Signs (24h Range):  Temp:  [97.4 °F (36.3 °C)-98.2 °F (36.8 °C)] 97.4 °F (36.3 °C)  Pulse:  [] 76  Resp:  [18-28] 18  SpO2:  [93 %-99 %] 97 %  BP: (149-177)/(70-92) 177/81     Weight: 119.3 kg (263 lb 0.1  oz)  Body mass index is 35.67 kg/m².    Intake/Output Summary (Last 24 hours) at 8/19/2024 1541  Last data filed at 8/19/2024 0840  Gross per 24 hour   Intake 1767.39 ml   Output 750 ml   Net 1017.39 ml         Physical Exam  Vitals and nursing note reviewed.   Constitutional:       General: He is awake. He is not in acute distress.     Appearance: He is obese. He is not ill-appearing.      Comments: Patient awake and alert and very interactive on exam and much more talkative than yesterday on my exam. Family at bedside.    Eyes:      General: No scleral icterus.  Cardiovascular:      Rate and Rhythm: Normal rate and regular rhythm.      Heart sounds: Normal heart sounds. No murmur heard.  Pulmonary:      Effort: Pulmonary effort is normal. No respiratory distress.      Breath sounds: Normal breath sounds. No wheezing.   Abdominal:      General: Abdomen is flat. Bowel sounds are normal. There is no distension.      Palpations: Abdomen is soft.      Tenderness: There is no abdominal tenderness. There is no guarding.   Musculoskeletal:      Right lower leg: No edema.      Left lower leg: No edema.   Skin:     Coloration: Skin is not jaundiced.      Findings: No erythema or rash.   Neurological:      Mental Status: He is alert and oriented to person, place, and time.   Psychiatric:         Mood and Affect: Mood normal.         Behavior: Behavior normal. Behavior is cooperative.         Thought Content: Thought content normal.         Judgment: Judgment normal.             Significant Labs: CBC:   Recent Labs   Lab 08/17/24 2012 08/17/24 2041 08/18/24  0632 08/19/24  0556   WBC 21.46*  --  16.95* 16.05*   HGB 11.7*  --  10.9* 10.8*   HCT 39.5* 30* 36.3* 36.4*   *  --  104* 152     CMP:   Recent Labs   Lab 08/17/24 2012 08/18/24  0632 08/19/24  0556   * 131* 129*   K 5.0 4.7 4.3    100 101   CO2 19* 22* 18*   * 97 123*   BUN 37* 35* 32*   CREATININE 1.8* 1.4 1.3   CALCIUM 9.1 8.6* 8.5*   PROT  7.4 6.7 6.5   ALBUMIN 2.7* 2.5* 2.3*   BILITOT 1.7* 1.7* 1.8*   ALKPHOS 377* 364* 424*   * 504* 557*   * 430* 569*   ANIONGAP 13 9 10     Cardiac Markers:   Recent Labs   Lab 08/19/24  0556   *     Coagulation:   Recent Labs   Lab 08/19/24  0556   INR 1.6*     Urine Culture, Routine   Date Value Ref Range Status   08/17/2024 (A)  Preliminary    GRAM NEGATIVE TOM  10,000 - 49,999 cfu/ml  Identification and susceptibility pending     08/17/2024 (A)  Preliminary    ENTEROCOCCUS SPECIES  > 100,000 cfu/ml  Identification and susceptibility pending         Blood Culture, Routine   Date Value Ref Range Status   08/17/2024 No Growth to date  Preliminary   08/17/2024 No Growth to date  Preliminary   08/17/2024 No Growth to date  Preliminary   08/17/2024 No Growth to date  Preliminary     Significant Imaging: I have reviewed all pertinent imaging results/findings within the past 24 hours.    Assessment/Plan:      * Sepsis with encephalopathy without septic shock  Sepsis is resolving and improving.   Concern at this time is for sepsis which is causing worsening or exacerbation of hepatic encephalopathy but now improving.     Source seems most likely to be acute cystitis, patient recently had abdominal ultrasound did not have enough ascites safe to tap, continue empiric antibiotics with IV Vancomycin and Ceftriaxone as strongly suspect urinary source and urine culture now positive on 8/19 so urinary tract is source of sepsis and infection. CXR on admit unremarkable. Blood cultures sent on 8/17 and so far no growth. Culture data reviewed:    Urine Culture, Routine   Date Value Ref Range Status   08/17/2024 (A)  Preliminary    GRAM NEGATIVE TOM  10,000 - 49,999 cfu/ml  Identification and susceptibility pending     08/17/2024 (A)  Preliminary    ENTEROCOCCUS SPECIES  > 100,000 cfu/ml  Identification and susceptibility pending       Blood Culture, Routine   Date Value Ref Range Status   08/17/2024 No Growth to  "date  Preliminary   08/17/2024 No Growth to date  Preliminary   08/17/2024 No Growth to date  Preliminary   08/17/2024 No Growth to date  Preliminary     Lactic acid elevated 3.3 on admit on 8/17, patient received 1 liter of normal saline and rechecked lactic acid and back to normal at 2.1 on am of 8/18 and down to 1.8 on 8/19.     Antibiotics given-   Antibiotics (72h ago, onward)      Start     Stop Route Frequency Ordered    08/18/24 2330  vancomycin 1,250 mg in D5W 250 mL IVPB (Vial-Mate)         -- IV Every 24 hours (non-standard times) 08/17/24 2229 08/17/24 2300  rifAXIMin tablet 550 mg         -- Oral 2 times daily 08/17/24 2151 08/17/24 2300  cefTRIAXone (ROCEPHIN) 2 g in D5W 100 mL IVPB (MB+)         -- IV Every 24 hours (non-standard times) 08/17/24 2200 08/17/24 2259  vancomycin - pharmacy to dose  (vancomycin IVPB (PEDS and ADULTS))        Placed in "And" Linked Group    -- IV pharmacy to manage frequency 08/17/24 2200          Latest lactate reviewed-  Recent Labs   Lab 08/17/24 2222 08/18/24  0632 08/19/24  0556   LACTATE  --    < > 1.8   POCLAC 3.32*  --   --     < > = values in this interval not displayed.     Continue current antibiotics pending final urine culture results.     PAF (paroxysmal atrial fibrillation)  Patient with Paroxysmal (<7 days) atrial fibrillation which is new onset and currently controlled. Plan to start Metoprolol 25 mg po BID for rate control as at times HR up to 160's but very briefly. Recent echo done on 8/5/2024 with normal EF 65% and no diastolic dysfunction an no valvular abnormalities. [id number] Score: 2. Anticoagulation indicated. Anticoagulation done with Apixiban that patient is already on for his atrial thrombus and PVT .    ELAINE (acute kidney injury)  - ELAINE resolved on 8/19. Creatinine down to 1.3 and back to baseline level. NO further IVF's needed at this time.   - Improved with IVF's given on admit in ED. Creatinine 1.8 on admit and down to 1.4 on " 8/18. Given additional 1 liter of NS on 8/18. Monitor daily BMP and urine output. Hold home diuretics for now due to ELAINE/sepsis.   - ELAINE is likely due to  sepsis/hypovolemia . Baseline creatinine is  1.3 . Most recent creatinine and eGFR are listed below.  Recent Labs     08/17/24 2012 08/18/24  0632 08/19/24  0556   CREATININE 1.8* 1.4 1.3   EGFRNORACEVR 39.3* 53.1* 58.0*        Plan  - ELAINE is improving on 8/18 an 8/19.   - Hold home diuretics fro now but plan to resume on hospital discharge.   - Monitor serial daily BMPs.  - Renally dose medications based on GFR.  - Avoid nephrotoxic agents and medications.     Hepatic encephalopathy  No evidence of acute encephalopathy at this time and continue home Lactulose and Rifaximin.   Doubt cause of his acute decompensation at home but at high risk of hepatic encephalopathy in light of sepsis/infection which is a none precipitant of hepatic encephalopathy. Continue Lactulose dose to 30 g every 8 hours and titrate fro 3-4 BMs a day. Continue Rifaximin to treat HE.    Hold Gabapentin at this time with change in his renal function.    Hepatocellular carcinoma  Patient has not seen Oncology yet for his new diagnosis of HCC and due to see them outpatient on 8/19 so will need to reschedule as in hospital to discuss potential treatment options. Not liver transplant candidate per Hepatology on last admit as large tumor and and has PVT involvement. LFT's elevated on admit but likely related to his known HCC.       Atrial thrombosis  Present on admit and continue home anticoagulation with Apixiban 5 mg po BID.       PVT (portal vein thrombosis)  Present on admit and continue home anticoagulation with Apixiban 5 mg po BID.       History of pulmonary embolism  Present on admit and continue home anticoagulation with Apixiban 5 mg po BID.       Type 2 diabetes mellitus without complication, without long-term current use of insulin  Patient's FSGs are controlled on current medication  regimen. Patient on oral Metformin at home to treat and hold in hospital.  Last A1c reviewed-   Lab Results   Component Value Date    HGBA1C 6.9 (H) 08/14/2024     Most recent fingerstick glucose reviewed-   Recent Labs   Lab 08/18/24 2029 08/19/24  0829   POCTGLUCOSE 160* 134*       Current correctional scale  Low  Maintain anti-hyperglycemic dose as follows-   Antihyperglycemics (From admission, onward)      Start     Stop Route Frequency Ordered    08/17/24 2150  insulin aspart U-100 pen 0-5 Units         -- SubQ Before meals & nightly PRN 08/17/24 2151          Hold Oral hypoglycemics while patient is in the hospital.  Monitor blood sugars with meals and at bedtime in hospital.  Diabetic diet.  Target blood sugars 140-180 in hospital.         Acquired hypothyroidism  Chronic and controlled. Continue home Levothyroxine to treat.       Thrombocytopenia  Improved to 152,000 on 8/19. The likely etiology of thrombocytopenia is liver disease. The patients 3 most recent labs are listed below.  Recent Labs     08/17/24 2012 08/18/24  0632 08/19/24  0556   * 104* 152       Plan  - Will transfuse if platelet count is <10k.  - Monitor daily CBC.       Class 1 obesity due to excess calories with serious comorbidity and body mass index (BMI) of 34.0 to 34.9 in adult  Body mass index is 35.67 kg/m². Morbid obesity complicates all aspects of disease management from diagnostic modalities to treatment. Weight loss encouraged and health benefits explained to patient.         Fall  Status post CT head and CT cervical spine without an acute traumatic injury  Fall precautions        VTE Risk Mitigation (From admission, onward)           Ordered     apixaban tablet 5 mg  2 times daily         08/17/24 2151                    Discharge Planning   PARIS: 8/23/2024     Code Status: Full Code   Is the patient medically ready for discharge?:     Reason for patient still in hospital (select all that apply): Patient new problem and  Patient trending condition  Discharge Plan A: Home with family and awaiting final urine culture results to ensure patient discharged on correct antibiotics and need to be sure HR controlled as with new onset PAF prior to being medically ready for discharge.        Iesha Stahl MD  Department of Hospital Medicine   [facility] - Transplant Stepdown

## 2024-08-19 NOTE — ASSESSMENT & PLAN NOTE
"Sepsis is resolving and improving.   Concern at this time is for sepsis which is causing worsening or exacerbation of hepatic encephalopathy but now improving.     Source seems most likely to be acute cystitis, patient recently had abdominal ultrasound did not have enough ascites safe to tap, continue empiric antibiotics with IV Vancomycin and Ceftriaxone as strongly suspect urinary source and urine culture now positive on 8/19 so urinary tract is source of sepsis and infection. CXR on admit unremarkable. Blood cultures sent on 8/17 and so far no growth. Culture data reviewed:    Urine Culture, Routine   Date Value Ref Range Status   08/17/2024 (A)  Preliminary    GRAM NEGATIVE TOM  10,000 - 49,999 cfu/ml  Identification and susceptibility pending     08/17/2024 (A)  Preliminary    ENTEROCOCCUS SPECIES  > 100,000 cfu/ml  Identification and susceptibility pending       Blood Culture, Routine   Date Value Ref Range Status   08/17/2024 No Growth to date  Preliminary   08/17/2024 No Growth to date  Preliminary   08/17/2024 No Growth to date  Preliminary   08/17/2024 No Growth to date  Preliminary     Lactic acid elevated 3.3 on admit on 8/17, patient received 1 liter of normal saline and rechecked lactic acid and back to normal at 2.1 on am of 8/18 and down to 1.8 on 8/19.     Antibiotics given-   Antibiotics (72h ago, onward)      Start     Stop Route Frequency Ordered    08/18/24 2330  vancomycin 1,250 mg in D5W 250 mL IVPB (Vial-Mate)         -- IV Every 24 hours (non-standard times) 08/17/24 2229    08/17/24 2300  rifAXIMin tablet 550 mg         -- Oral 2 times daily 08/17/24 2151 08/17/24 2300  cefTRIAXone (ROCEPHIN) 2 g in D5W 100 mL IVPB (MB+)         -- IV Every 24 hours (non-standard times) 08/17/24 2200 08/17/24 2259  vancomycin - pharmacy to dose  (vancomycin IVPB (PEDS and ADULTS))        Placed in "And" Linked Group    -- IV pharmacy to manage frequency 08/17/24 2200          Latest lactate " reviewed-  Recent Labs   Lab 08/17/24  2222 08/18/24  0632 08/19/24  0556   LACTATE  --    < > 1.8   POCLAC 3.32*  --   --     < > = values in this interval not displayed.     Continue current antibiotics pending final urine culture results.

## 2024-08-19 NOTE — NURSING
Nurses Note -- 4 Eyes      8/19/2024   2:57 AM      Skin assessed during: Q Shift Change      [x] No Altered Skin Integrity Present    []Prevention Measures Documented      [] Yes- Altered Skin Integrity Present or Discovered   [] LDA Added if Not in Epic (Describe Wound)   [] New Altered Skin Integrity was Present on Admit and Documented in LDA   [] Wound Image Taken    Wound Care Consulted? No    Attending Nurse:  Kenny Davalos RN/Staff Member:   [place]

## 2024-08-19 NOTE — ASSESSMENT & PLAN NOTE
Doubt cause of his acute decompensation at home but at high risk of hepatic encephalopathy in light of sepsis/infection which is a none precipitant of hepatic encephalopathy. Continue Lactulose dose to 30 g every 8 hours and titrate fro 3-4 BMs a day. Continue Rifaximin to treat HE.    Hold Gabapentin at this time with change in his renal function.

## 2024-08-19 NOTE — PLAN OF CARE
Patient was admitted on 8/16 with AMS related with encephalopathy and UTI. VSS . Afebrile. Spo2 maintained@RA. Alert and orientedX4 with delayed response. [place] on hearing, hearing aid not available . Urine culture +ve for bacteria. IV Rocephin and Vancomycin contd Q24.     Patient got 1 L IV NS bolus during the day for tachycardia, expiratory wheezes heard on auscultation with sats maintained above 92%. Informed Rapid team and MD Poe. PRN breathing treatmentX1, InhalerX1, IV lasix 60 mg given as per the order. Chest X-ray done. Condom catheter placed for the accurate urine measurement. Patient voided about > 400 cc of urine, with improvement in breath sounds . 4 BM this shift, stool loose diarrhea.    Bed in lowest position, wheels locked, bed alarm on. [employer] camera in the room. Wife at bedside actively participating in care. Plan of care reviewed and discussed with the patient and his wife, verbalized understanding of care.

## 2024-08-19 NOTE — PLAN OF CARE
AAOx4. VSS. Patient up OOB to chair and bathroom w/ 1x assist. AST//569 (increased from 504/430 prior) and T-bili 1.8 (increased from 1.7 prior). Patient went into a fib on telemetry w/ HR 90s-120s - Dr. Stahl notified and PO lopressor 25mg started BID. Patient is now in a fib on telemetry w/ rate-controlled HR 70s-80s. Urine studies (Cr, Na, urea nitrogen, and urine osmolality) collected and sent this shift - see flowsheet for results. Rocephin IVPB and Vancomycin IVPB continued Q24hrs nightly for UTI. Urine cx 8/17 resulted (+) GNR and enterococcus species. BM x4 so far this - 1500 PO lactulose dose HELD. Patient voiding yellow urine spontaneously. Non-skid socks on. Bed in low position. Call light within reach. Bed alarm on when patient is in bed. Family at bedside.

## 2024-08-19 NOTE — ASSESSMENT & PLAN NOTE
Patient's FSGs are controlled on current medication regimen. Patient on oral Metformin at home to treat and hold in hospital.  Last A1c reviewed-   Lab Results   Component Value Date    HGBA1C 6.9 (H) 08/14/2024     Most recent fingerstick glucose reviewed-   Recent Labs   Lab 08/17/24  2231 08/18/24  0714 08/18/24  1108 08/18/24  1524   POCTGLUCOSE 141* 113* 140* 144*     Current correctional scale  Low  Maintain anti-hyperglycemic dose as follows-   Antihyperglycemics (From admission, onward)      Start     Stop Route Frequency Ordered    08/17/24 2150  insulin aspart U-100 pen 0-5 Units         -- SubQ Before meals & nightly PRN 08/17/24 2151          Hold Oral hypoglycemics while patient is in the hospital.  Monitor blood sugars with meals and at bedtime in hospital.  Diabetic diet.  Target blood sugars 140-180 in hospital.

## 2024-08-19 NOTE — ASSESSMENT & PLAN NOTE
No evidence of acute encephalopathy at this time and continue home Lactulose and Rifaximin.   Doubt cause of his acute decompensation at home but at high risk of hepatic encephalopathy in light of sepsis/infection which is a none precipitant of hepatic encephalopathy. Continue Lactulose dose to 30 g every 8 hours and titrate fro 3-4 BMs a day. Continue Rifaximin to treat HE.    Hold Gabapentin at this time with change in his renal function.

## 2024-08-19 NOTE — CARE UPDATE
RAPID RESPONSE NURSE PROACTIVE ROUNDING NOTE       Time of Visit: 0010    Admit Date: 2024  LOS: 1  Code Status: Full Code   Date of Visit: 2024  : 1950  Age: 74 y.o.  Sex: male  Race: White  Bed: 80534/41629 A:   MRN: [id number]  Was the patient discharged from an ICU this admission? No   Was the patient discharged from a PACU within last 24 hours? No   Did the patient receive conscious sedation/general anesthesia in last 24 hours? No  Was the patient in the ED within the past 24 hours? No  Was the patient on NIPPV within the past 24 hours? No   Attending Physician: Iesha Stahl MD  Primary Service: [facility]   Time spent at the bedside: 15 -30 min    SITUATION    Notified by charge RN via phone call.  Reason for alert: increased WOB  Called to evaluate the patient for Respiratory.    BACKGROUND     Why is the patient in the hospital?: Sepsis with encephalopathy without septic shock    Patient has a past medical history of Atrial thrombosis, Diabetes mellitus, type 2, Essential hypertension, HCC (hepatocellular carcinoma), Hepatic encephalopathy, Obesity, Portal vein thrombosis, Pulmonary embolism, and Sebaceous cyst.    Last Vitals:  Temp: 98 °F (36.7 °C) ( 2349)  Pulse: 94 ( 0024)  Resp: 26 ( 0024)  BP: 166/88 ( 0017)  SpO2: 99 % ( 002)    24 Hours Vitals Range:  Temp:  [97.6 °F (36.4 °C)-98.8 °F (37.1 °C)]   Pulse:  []   Resp:  [18-28]   BP: (142-166)/(70-92)   SpO2:  [93 %-99 %]     Labs:  Recent Labs     24  0632   WBC 9.82 21.46*  --  16.95*   HGB 11.1* 11.7*  --  10.9*   HCT 35.7* 39.5* 30* 36.3*   PLT 63* 107*  --  104*       Recent Labs     24  0632    133* 131*   K 4.2 5.0 4.7    101 100   CO2 23 19* 22*   BUN 36* 37* 35*   CREATININE 1.3 1.8* 1.4   GLU 99 141* 97   PHOS  --   --  3.1   MG  --  2.1 2.0        Recent Labs     24   PH  "7.383   PCO2 30.3*   PO2 50   HCO3 18.1*   POCSATURATED 85   BE -7*        ASSESSMENT      Physical Exam  Constitutional:       Appearance: He is ill-appearing.   Cardiovascular:      Rate and Rhythm: Normal rate.   Pulmonary:      Effort: Tachypnea present.      Breath sounds: Examination of the right-upper field reveals wheezing. Examination of the left-upper field reveals wheezing. Examination of the right-lower field reveals decreased breath sounds. Examination of the left-lower field reveals decreased breath sounds. Decreased breath sounds and wheezing present.   Abdominal:      General: There is distension.   Musculoskeletal:      Right lower leg: 3+ Edema present.      Left lower leg: 3+ Edema present.   Skin:     General: Skin is warm and dry.      Coloration: Skin is jaundiced and pale.      Findings: Bruising present.   Neurological:      Mental Status: He is alert.     HR 93  /88 (118)  RR 28, SpO2 99% on 2L NC    Notified by charge RN that when patient was assisted up to BSC he had a dramatic increase in his work of breathing and developed significant wheezing in bilateral upper lobes. SpO2 dropped to ~88% on room air    Upon RRN arrival patient back to bed, still tachypneic at rest with notable wheezing, sats up to 98-99% on 2L NC. States he still feels "a little" short of breath but it is improved.     INTERVENTIONS    The patient was seen for Respiratory problem. Staff concerns included tachypnea, increased WOB, and increased oxygen requirements. The following interventions were performed: supplemental oxygen, portable chest x-ray, albuterol-ipratropium (DUO-NEB) 0.5-3 mg/ 3 ml nebulizer for wheezing, continuous pulse ox monitoring, and continuous cardiac monitoring continued.    RT to bedside to administer duo-neb, patient states his breathing feels improved post breathing treatement  CXR obtained    Dr Poe with  notified, order placed for one-time dose 60mg IVP lasix  Administered by " bedside RN, patient placed on condom cath for accurate I&O    RECOMMENDATIONS    Maintain continuous cardiac, SpO2 monitoring  Titrate oxygen as necessary to maintain SpO2 92% and above  Strict I&O    PROVIDER ESCALATION    Yes/No  Yes    Orders received and case discussed with Dr. Poe .    Disposition: Remain in room 12240.    FOLLOW-UP    Rounding completed with charge RN Rosana, bedside RN Kenny reports will continue to monitor pt closely. updated on plan of care. Instructed to call the Rapid Response NurseDUYEN RN at [phone] for additional questions or concerns.

## 2024-08-19 NOTE — ASSESSMENT & PLAN NOTE
"Concern at this time is for sepsis which is causing worsening or exacerbation of hepatic encephalopathy and I suspect also the recurrence of ELAINE.      Source seems most likely to be acute cystitis, patient recently had abdominal ultrasound did not have enough ascites safe to tap, continue empiric antibiotics with IV Vancomycin and Ceftriaxone as strongly suspect urinary source. CXR on admit unremarkable. Blood cultures sent on 8/17 and so far no growth.     Lactic acid elevated 3.3 on admit on 8/17, patient received 1 liter of normal saline and rechecked lactic acid and back to normal at 2.1 on am of 8/18. Give 1 more liter of normal saline today on 8/18.     Antibiotics given-   Antibiotics (72h ago, onward)      Start     Stop Route Frequency Ordered    08/18/24 2330  vancomycin 1,250 mg in D5W 250 mL IVPB (Vial-Mate)         -- IV Every 24 hours (non-standard times) 08/17/24 2229 08/17/24 2300  rifAXIMin tablet 550 mg         -- Oral 2 times daily 08/17/24 2151 08/17/24 2300  cefTRIAXone (ROCEPHIN) 2 g in D5W 100 mL IVPB (MB+)         -- IV Every 24 hours (non-standard times) 08/17/24 2200 08/17/24 2259  vancomycin - pharmacy to dose  (vancomycin IVPB (PEDS and ADULTS))        Placed in "And" Linked Group    -- IV pharmacy to manage frequency 08/17/24 2200          Latest lactate reviewed-  Recent Labs   Lab 08/17/24 2222 08/18/24  0632   LACTATE  --  2.1   POCLAC 3.32*  --      Continue current antibiotics on 8/18 pending culture results.   "

## 2024-08-19 NOTE — ASSESSMENT & PLAN NOTE
Improved with IVF's given on admit in ED. Creatinine 1.8 on admit and down to 1.4 on 8/18. Given additional 1 liter of NS on 8/18. Monitor daily BMP and urine output. Hold home diuretics for now due to ELAINE/sepsis.   ELAINE is likely due to  sepsis/hypovolemia . Baseline creatinine is  1.3 . Most recent creatinine and eGFR are listed below.  Recent Labs     08/16/24  0326 08/17/24 2012 08/18/24  0632   CREATININE 1.3 1.8* 1.4   EGFRNORACEVR 58.0* 39.3* 53.1*        Plan  - ELAINE is improving on 8/18. Continue IVFs.   - Hold home diuretics.   - Monitor serial daily BMPs.  - Renally dose medications based on GFR.  - Avoid nephrotoxic agents and medications.

## 2024-08-19 NOTE — ASSESSMENT & PLAN NOTE
The likely etiology of thrombocytopenia is liver disease. The patients 3 most recent labs are listed below.  Recent Labs     08/16/24  0326 08/17/24 2012 08/18/24  0632   PLT 63* 107* 104*     Plan  - Will transfuse if platelet count is <10k.  - Monitor daily CBC.

## 2024-08-19 NOTE — SUBJECTIVE & OBJECTIVE
Interval History: Patient having intermittent runs of atrial fibrillation on telemetry since yesterday with variable ventricular rates and some as high as 160 but at other times < 100 but RVR not sustained and patient with no prior history. Patient already anticoagulated on Apixiban for PVT and atrial thrombus so will continue. Will start Metoprolol 25 mg po BID for rate control. Patient in and out of A. Fib and not persistent so strategy is rate not rhythm control. Patient had recent echo on 8/5/2024 that showed normal EF 65% and normal diastolic function and no valvular abnormalities. Patient had rapid called last night for decreasing oxygen sats and placed on 2 liters of oxygen and wheezing. Given IV Lasix and CXR done but showed no pulmonary edema so doubt relate to volume overload. Patient given Duoneb with improvement and back on room air this am and states he is not having any breathing difficulties. Urine culture returned today with 10,000-49,999 with gram negative renato and > 100,000 with Enterococcus species. Patient on IV Ceftriaxone and should cover GNR and on IV Vancomycin that should cover Enterococcus for now pending final culture results and sensitivities and must await those results prior to making any discharge planning and explained to patient and his family at bedside as patient states he is feeling much better and ready to go home but I explained need to wait for final cultures and sensitivities to make sure send him home on correct antibiotics to treat his infection.  Patient states he is feeling much stronger and has been getting up and going to bathroom with his family and nursing and doing much better moving around. Patient anxious to discharge but explained need to await results of final urine culture and sensitivities and make sure HR is controlled prior to going home but possibly tomorrow. Labs reviewed. Hgb stable at 10.8 and 10.9 yesterday. WBC slightly improved from 16,900 yesterday to  16,000 today. Platelets improved from 104,000 to 152,000 today. Sodium slightly down from 131 yesterday to 129 today but asymptomatic.     Review of Systems   Constitutional:  Negative for fatigue and fever.   Respiratory:  Negative for cough and shortness of breath.    Cardiovascular:  Negative for chest pain, palpitations and leg swelling.   Gastrointestinal:  Negative for abdominal pain, diarrhea, nausea and vomiting.   Musculoskeletal:  Negative for arthralgias.   Neurological:  Negative for dizziness. Weakness: Generalized.  Psychiatric/Behavioral:  Negative for agitation and confusion.      Objective:     Vital Signs (Most Recent):  Temp: 97.4 °F (36.3 °C) (08/19/24 1137)  Pulse: 76 (08/19/24 1459)  Resp: 18 (08/19/24 1137)  BP: 177/81 (08/19/24 1137)  SpO2: 97 % (08/19/24 1137) on room air  Vital Signs (24h Range):  Temp:  [97.4 °F (36.3 °C)-98.2 °F (36.8 °C)] 97.4 °F (36.3 °C)  Pulse:  [] 76  Resp:  [18-28] 18  SpO2:  [93 %-99 %] 97 %  BP: (149-177)/(70-92) 177/81     Weight: 119.3 kg (263 lb 0.1 oz)  Body mass index is 35.67 kg/m².    Intake/Output Summary (Last 24 hours) at 8/19/2024 1541  Last data filed at 8/19/2024 0840  Gross per 24 hour   Intake 1767.39 ml   Output 750 ml   Net 1017.39 ml         Physical Exam  Vitals and nursing note reviewed.   Constitutional:       General: He is awake. He is not in acute distress.     Appearance: He is obese. He is not ill-appearing.      Comments: Patient awake and alert and very interactive on exam and much more talkative than yesterday on my exam. Family at bedside.    Eyes:      General: No scleral icterus.  Cardiovascular:      Rate and Rhythm: Normal rate and regular rhythm.      Heart sounds: Normal heart sounds. No murmur heard.  Pulmonary:      Effort: Pulmonary effort is normal. No respiratory distress.      Breath sounds: Normal breath sounds. No wheezing.   Abdominal:      General: Abdomen is flat. Bowel sounds are normal. There is no distension.       Palpations: Abdomen is soft.      Tenderness: There is no abdominal tenderness. There is no guarding.   Musculoskeletal:      Right lower leg: No edema.      Left lower leg: No edema.   Skin:     Coloration: Skin is not jaundiced.      Findings: No erythema or rash.   Neurological:      Mental Status: He is alert and oriented to person, place, and time.   Psychiatric:         Mood and Affect: Mood normal.         Behavior: Behavior normal. Behavior is cooperative.         Thought Content: Thought content normal.         Judgment: Judgment normal.             Significant Labs: CBC:   Recent Labs   Lab 08/17/24 2012 08/17/24 2041 08/18/24  0632 08/19/24  0556   WBC 21.46*  --  16.95* 16.05*   HGB 11.7*  --  10.9* 10.8*   HCT 39.5* 30* 36.3* 36.4*   *  --  104* 152     CMP:   Recent Labs   Lab 08/17/24 2012 08/18/24 0632 08/19/24  0556   * 131* 129*   K 5.0 4.7 4.3    100 101   CO2 19* 22* 18*   * 97 123*   BUN 37* 35* 32*   CREATININE 1.8* 1.4 1.3   CALCIUM 9.1 8.6* 8.5*   PROT 7.4 6.7 6.5   ALBUMIN 2.7* 2.5* 2.3*   BILITOT 1.7* 1.7* 1.8*   ALKPHOS 377* 364* 424*   * 504* 557*   * 430* 569*   ANIONGAP 13 9 10     Cardiac Markers:   Recent Labs   Lab 08/19/24  0556   *     Coagulation:   Recent Labs   Lab 08/19/24  0556   INR 1.6*     Urine Culture, Routine   Date Value Ref Range Status   08/17/2024 (A)  Preliminary    GRAM NEGATIVE TOM  10,000 - 49,999 cfu/ml  Identification and susceptibility pending     08/17/2024 (A)  Preliminary    ENTEROCOCCUS SPECIES  > 100,000 cfu/ml  Identification and susceptibility pending         Blood Culture, Routine   Date Value Ref Range Status   08/17/2024 No Growth to date  Preliminary   08/17/2024 No Growth to date  Preliminary   08/17/2024 No Growth to date  Preliminary   08/17/2024 No Growth to date  Preliminary     Significant Imaging: I have reviewed all pertinent imaging results/findings within the past 24 hours.

## 2024-08-19 NOTE — ASSESSMENT & PLAN NOTE
Improved to 152,000 on 8/19. The likely etiology of thrombocytopenia is liver disease. The patients 3 most recent labs are listed below.  Recent Labs     08/17/24 2012 08/18/24  0632 08/19/24  0556   * 104* 152       Plan  - Will transfuse if platelet count is <10k.  - Monitor daily CBC.

## 2024-08-19 NOTE — ASSESSMENT & PLAN NOTE
- ELAINE resolved on 8/19. Creatinine down to 1.3 and back to baseline level. NO further IVF's needed at this time.   - Improved with IVF's given on admit in ED. Creatinine 1.8 on admit and down to 1.4 on 8/18. Given additional 1 liter of NS on 8/18. Monitor daily BMP and urine output. Hold home diuretics for now due to ELAINE/sepsis.   - ELAINE is likely due to  sepsis/hypovolemia . Baseline creatinine is  1.3 . Most recent creatinine and eGFR are listed below.  Recent Labs     08/17/24 2012 08/18/24  0632 08/19/24  0556   CREATININE 1.8* 1.4 1.3   EGFRNORACEVR 39.3* 53.1* 58.0*        Plan  - ELAINE is improving on 8/18 an 8/19.   - Hold home diuretics fro now but plan to resume on hospital discharge.   - Monitor serial daily BMPs.  - Renally dose medications based on GFR.  - Avoid nephrotoxic agents and medications.

## 2024-08-20 VITALS
DIASTOLIC BLOOD PRESSURE: 73 MMHG | BODY MASS INDEX: 35.62 KG/M2 | OXYGEN SATURATION: 98 % | TEMPERATURE: 98 F | RESPIRATION RATE: 16 BRPM | HEIGHT: 72 IN | SYSTOLIC BLOOD PRESSURE: 160 MMHG | HEART RATE: 60 BPM | WEIGHT: 263 LBS

## 2024-08-20 PROBLEM — A41.9 SEPSIS WITH ENCEPHALOPATHY WITHOUT SEPTIC SHOCK: Status: RESOLVED | Noted: 2024-08-17 | Resolved: 2024-08-20

## 2024-08-20 PROBLEM — G93.41 SEPSIS WITH ENCEPHALOPATHY WITHOUT SEPTIC SHOCK: Status: RESOLVED | Noted: 2024-08-17 | Resolved: 2024-08-20

## 2024-08-20 PROBLEM — R65.20 SEPSIS WITH ENCEPHALOPATHY WITHOUT SEPTIC SHOCK: Status: RESOLVED | Noted: 2024-08-17 | Resolved: 2024-08-20
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
## 2024-08-20 PROBLEM — N17.9 AKI (ACUTE KIDNEY INJURY): Status: RESOLVED | Noted: 2024-08-14 | Resolved: 2024-08-20

## 2024-08-20 LAB
ALBUMIN SERPL BCP-MCNC: 2.3 G/DL (ref 3.5–5.2)
ALP SERPL-CCNC: 440 U/L (ref 55–135)
ALT SERPL W/O P-5'-P-CCNC: 473 U/L (ref 10–44)
ANION GAP SERPL CALC-SCNC: 9 MMOL/L (ref 8–16)
AST SERPL-CCNC: 440 U/L (ref 10–40)
BACTERIA UR CULT: ABNORMAL
BACTERIA UR CULT: ABNORMAL
BASOPHILS # BLD AUTO: 0.07 K/UL (ref 0–0.2)
BASOPHILS NFR BLD: 0.6 % (ref 0–1.9)
BILIRUB SERPL-MCNC: 1.3 MG/DL (ref 0.1–1)
BUN SERPL-MCNC: 28 MG/DL (ref 8–23)
CALCIUM SERPL-MCNC: 8.6 MG/DL (ref 8.7–10.5)
CHLORIDE SERPL-SCNC: 103 MMOL/L (ref 95–110)
CO2 SERPL-SCNC: 20 MMOL/L (ref 23–29)
CREAT SERPL-MCNC: 1.2 MG/DL (ref 0.5–1.4)
DIFFERENTIAL METHOD BLD: ABNORMAL
EOSINOPHIL # BLD AUTO: 0.1 K/UL (ref 0–0.5)
EOSINOPHIL NFR BLD: 0.7 % (ref 0–8)
ERYTHROCYTE [DISTWIDTH] IN BLOOD BY AUTOMATED COUNT: 16.4 % (ref 11.5–14.5)
EST. GFR  (NO RACE VARIABLE): >60 ML/MIN/1.73 M^2
GLUCOSE SERPL-MCNC: 88 MG/DL (ref 70–110)
HCT VFR BLD AUTO: 38.5 % (ref 40–54)
HGB BLD-MCNC: 11.6 G/DL (ref 14–18)
IMM GRANULOCYTES # BLD AUTO: 0.09 K/UL (ref 0–0.04)
IMM GRANULOCYTES NFR BLD AUTO: 0.8 % (ref 0–0.5)
INR PPP: 1.6 (ref 0.8–1.2)
LYMPHOCYTES # BLD AUTO: 2.2 K/UL (ref 1–4.8)
LYMPHOCYTES NFR BLD: 19.3 % (ref 18–48)
MAGNESIUM SERPL-MCNC: 2.2 MG/DL (ref 1.6–2.6)
MCH RBC QN AUTO: 24.2 PG (ref 27–31)
MCHC RBC AUTO-ENTMCNC: 30.1 G/DL (ref 32–36)
MCV RBC AUTO: 80 FL (ref 82–98)
MONOCYTES # BLD AUTO: 0.9 K/UL (ref 0.3–1)
MONOCYTES NFR BLD: 8 % (ref 4–15)
NEUTROPHILS # BLD AUTO: 8 K/UL (ref 1.8–7.7)
NEUTROPHILS NFR BLD: 70.6 % (ref 38–73)
NRBC BLD-RTO: 0 /100 WBC
PHOSPHATE SERPL-MCNC: 2.4 MG/DL (ref 2.7–4.5)
PLATELET # BLD AUTO: 170 K/UL (ref 150–450)
PMV BLD AUTO: 11.4 FL (ref 9.2–12.9)
POCT GLUCOSE: 113 MG/DL (ref 70–110)
POCT GLUCOSE: 132 MG/DL (ref 70–110)
POCT GLUCOSE: 143 MG/DL (ref 70–110)
POTASSIUM SERPL-SCNC: 4.3 MMOL/L (ref 3.5–5.1)
PROT SERPL-MCNC: 6.4 G/DL (ref 6–8.4)
PROTHROMBIN TIME: 16.6 SEC (ref 9–12.5)
RBC # BLD AUTO: 4.79 M/UL (ref 4.6–6.2)
SODIUM SERPL-SCNC: 132 MMOL/L (ref 136–145)
WBC # BLD AUTO: 11.32 K/UL (ref 3.9–12.7)

## 2024-08-20 PROCEDURE — 85025 COMPLETE CBC W/AUTO DIFF WBC: CPT | Performed by: HOSPITALIST

## 2024-08-20 PROCEDURE — 84100 ASSAY OF PHOSPHORUS: CPT | Performed by: HOSPITALIST

## 2024-08-20 PROCEDURE — 85610 PROTHROMBIN TIME: CPT | Performed by: HOSPITALIST

## 2024-08-20 PROCEDURE — 25000003 PHARM REV CODE 250: Performed by: INTERNAL MEDICINE

## 2024-08-20 PROCEDURE — 25000003 PHARM REV CODE 250: Performed by: HOSPITALIST

## 2024-08-20 PROCEDURE — 83735 ASSAY OF MAGNESIUM: CPT | Performed by: HOSPITALIST

## 2024-08-20 PROCEDURE — 80053 COMPREHEN METABOLIC PANEL: CPT | Performed by: HOSPITALIST

## 2024-08-20 PROCEDURE — 63600175 PHARM REV CODE 636 W HCPCS: Performed by: HOSPITALIST

## 2024-08-20 PROCEDURE — 36415 COLL VENOUS BLD VENIPUNCTURE: CPT | Performed by: HOSPITALIST

## 2024-08-20 PROCEDURE — 25000003 PHARM REV CODE 250: Performed by: STUDENT IN AN ORGANIZED HEALTH CARE EDUCATION/TRAINING PROGRAM

## 2024-08-20 RX ORDER — AMOXICILLIN AND CLAVULANATE POTASSIUM 875; 125 MG/1; MG/1
1 TABLET, FILM COATED ORAL EVERY 12 HOURS
Qty: 9 TABLET | Refills: 0 | Status: SHIPPED | OUTPATIENT
Start: 2024-08-20 | End: 2024-08-25

## 2024-08-20 RX ORDER — GABAPENTIN 400 MG/1
1600 CAPSULE ORAL 2 TIMES DAILY
Status: DISCONTINUED | OUTPATIENT
Start: 2024-08-20 | End: 2024-08-20 | Stop reason: HOSPADM

## 2024-08-20 RX ORDER — METOPROLOL TARTRATE 25 MG/1
12.5 TABLET, FILM COATED ORAL 2 TIMES DAILY
Qty: 60 TABLET | Refills: 0 | Status: SHIPPED | OUTPATIENT
Start: 2024-08-20 | End: 2024-10-19

## 2024-08-20 RX ORDER — HYDROCODONE BITARTRATE AND ACETAMINOPHEN 10; 325 MG/1; MG/1
1 TABLET ORAL EVERY 6 HOURS PRN
Status: DISCONTINUED | OUTPATIENT
Start: 2024-08-20 | End: 2024-08-20 | Stop reason: HOSPADM

## 2024-08-20 RX ORDER — AMOXICILLIN AND CLAVULANATE POTASSIUM 875; 125 MG/1; MG/1
1 TABLET, FILM COATED ORAL EVERY 12 HOURS
Status: DISCONTINUED | OUTPATIENT
Start: 2024-08-20 | End: 2024-08-20 | Stop reason: HOSPADM

## 2024-08-20 RX ADMIN — VANCOMYCIN HYDROCHLORIDE 1250 MG: 1.25 INJECTION, POWDER, LYOPHILIZED, FOR SOLUTION INTRAVENOUS at 12:08

## 2024-08-20 RX ADMIN — LEVOTHYROXINE SODIUM 200 MCG: 100 TABLET ORAL at 06:08

## 2024-08-20 RX ADMIN — AMOXICILLIN AND CLAVULANATE POTASSIUM 1 TABLET: 875; 125 TABLET, FILM COATED ORAL at 12:08

## 2024-08-20 RX ADMIN — GABAPENTIN 1600 MG: 400 CAPSULE ORAL at 09:08

## 2024-08-20 RX ADMIN — HYDROCORTISONE: 25 CREAM TOPICAL at 09:08

## 2024-08-20 RX ADMIN — METOPROLOL TARTRATE 25 MG: 25 TABLET, FILM COATED ORAL at 09:08

## 2024-08-20 RX ADMIN — HYDROCODONE BITARTRATE AND ACETAMINOPHEN 1 TABLET: 10; 325 TABLET ORAL at 09:08

## 2024-08-20 RX ADMIN — LACTULOSE 30 G: 20 SOLUTION ORAL at 09:08

## 2024-08-20 RX ADMIN — APIXABAN 5 MG: 5 TABLET, FILM COATED ORAL at 09:08

## 2024-08-20 RX ADMIN — RIFAXIMIN 550 MG: 550 TABLET ORAL at 09:08

## 2024-08-20 NOTE — PROGRESS NOTES
Pharmacokinetic Assessment Follow Up: IV Vancomycin    Vancomycin serum concentration assessment(s):    The trough level was drawn correctly and can be used to guide therapy at this time. The measurement is below the desired definitive target range of 15 to 20 mcg/mL.    Vancomycin Regimen Plan:    Change regimen to Vancomycin 1500 mg IV every 24 hours with next serum trough concentration measured at 2230 on 8/22.   Drug levels (last 3 results):  Recent Labs   Lab Result Units 08/19/24  2228   Vancomycin-Trough ug/mL 8.8*       Pharmacy will continue to follow and monitor vancomycin.    Please contact pharmacy at extension [phone] for questions regarding this assessment.    Thank you for the consult,   Alanna Collazo       Patient brief summary:  Howard G Sistrunk is a 74 y.o. male initiated on antimicrobial therapy with IV Vancomycin for treatment of sepsis      Drug Allergies:   Review of patient's allergies indicates:  No Known Allergies    Actual Body Weight:   119.3 kg     Renal Function:   Estimated Creatinine Clearance: 67.5 mL/min (based on SCr of 1.3 mg/dL).,     Dialysis Method (if applicable):  N/A    CBC (last 72 hours):  Recent Labs   Lab Result Units 08/17/24 2012 08/18/24 0632 08/19/24  0556   WBC K/uL 21.46* 16.95* 16.05*   Hemoglobin g/dL 11.7* 10.9* 10.8*   Hematocrit % 39.5* 36.3* 36.4*   Platelets K/uL 107* 104* 152   Gran % % 86.2* 80.2* 78.7*   Lymph % % 4.7* 7.5* 9.3*   Mono % % 8.5 11.2 11.0   Eosinophil % % 0.0 0.4 0.2   Basophil % % 0.1 0.3 0.2   Differential Method  Automated Automated Automated       Metabolic Panel (last 72 hours):  Recent Labs   Lab Result Units 08/17/24 2012 08/17/24 2225 08/18/24  0632 08/19/24  0556 08/19/24  0715   Sodium mmol/L 133*  --  131* 129*  --    Sodium, Urine mmol/L  --   --   --   --  57   Potassium mmol/L 5.0  --  4.7 4.3  --    Chloride mmol/L 101  --  100 101  --    CO2 mmol/L 19*  --  22* 18*  --    Glucose mg/dL 141*  --  97 123*  --    Glucose, UA    --  Negative  --   --   --    BUN mg/dL 37*  --  35* 32*  --    Creatinine mg/dL 1.8*  --  1.4 1.3  --    Creatinine, Urine mg/dL  --   --   --   --  47.0   Albumin g/dL 2.7*  --  2.5* 2.3*  --    Total Bilirubin mg/dL 1.7*  --  1.7* 1.8*  --    Alkaline Phosphatase U/L 377*  --  364* 424*  --    AST U/L 412*  --  504* 557*  --    ALT U/L 336*  --  430* 569*  --    Magnesium mg/dL 2.1  --  2.0 2.0  --    Phosphorus mg/dL  --   --  3.1 2.6*  --        Vancomycin Administrations:  vancomycin given in the last 96 hours                     vancomycin 1,250 mg in D5W 250 mL IVPB (Vial-Mate) (mg) 1,250 mg New Bag 08/20/24 0009      Restarted 08/19/24 0042     1,250 mg New Bag 08/18/24 2359    vancomycin 2 g in dextrose 5 % 500 mL IVPB ()  Restarted 08/18/24 0135     2,000 mg New Bag  0045                    Microbiologic Results:  Microbiology Results (last 7 days)       Procedure Component Value Units Date/Time    Urine culture [[id number]]  (Abnormal)  (Susceptibility) Collected: 08/17/24 2225    Order Status: Completed Specimen: Urine Updated: 08/19/24 1912     Urine Culture, Routine KLEBSIELLA PNEUMONIAE  10,000 - 49,999 cfu/ml  Susceptibility pending        ENTEROCOCCUS SPECIES  > 100,000 cfu/ml  Identification and susceptibility pending      Narrative:      Specimen Source->Urine    Blood culture [[id number]] Collected: 08/17/24 2216    Order Status: Completed Specimen: Blood from Peripheral, Lower Arm, Right Updated: 08/19/24 0614     Blood Culture, Routine No Growth to date      No Growth to date    Narrative:      Right Peripheral    Blood culture [[id number]] Collected: 08/17/24 2216    Order Status: Completed Specimen: Blood from Peripheral, Lower Arm, Right Updated: 08/19/24 0614     Blood Culture, Routine No Growth to date      No Growth to date    Narrative:      Left Peripheral    Gram stain [[id number]] Collected: 08/18/24 0117    Order Status: Completed Specimen: Urine from Clean Catch Updated: 08/18/24  0143     Gram Stain Result No WBC's      Few Gram positive cocci    Gram stain [[id number]]     Order Status: Completed Specimen: Urine

## 2024-08-20 NOTE — PLAN OF CARE
08/20/24 1432   Post-Acute Status   Post-Acute Authorization HME  (Rolling Walker)   HME Status Set-up Complete/Auth obtained     The patient received a rolling walker at bedside .     Chucky Tinoco LCSW  Case Management [facility]

## 2024-08-20 NOTE — PLAN OF CARE
[facility] - Transplant Stepdown  Discharge Final Note    Primary Care Provider: Willard España MD    Expected Discharge Date: 8/20/2024    Final Discharge Note (most recent)       Final Note - 08/20/24 1547          Final Note    Assessment Type Final Discharge Note     Anticipated Discharge Disposition Home-Health Care [facility]    Hospital Resources/Appts/Education Provided Appointments scheduled and added to AVS        Post-Acute Status    Post-Acute Authorization Home Health;HME     HME Status Set-up Complete/Auth obtained     Home Health Status Set-up Complete/Auth obtained                   The patient has been set-up with [facility]. The patient received a rolling walker at bedside before d/c.     Important Message from Medicare  Important Message from Medicare regarding Discharge Appeal Rights: Given to patient/caregiver, Explained to patient/caregiver, Signed/date by patient/caregiver     Date IMM was signed: 08/20/24  Time IMM was signed: 1031    Contact Info       [facility]    [phone]       Next Steps: Call        Chucky Tinoco LCSW  Case Management [facility]

## 2024-08-20 NOTE — PLAN OF CARE
[facility]    HOME HEALTH ORDERS  FACE TO FACE ENCOUNTER    Patient Name: Howard G Sistrunk  YOB: 1950    PCP: Willard España MD   PCP Address: [address] [place] [address] [place] [address]  PCP Phone Number: [phone]  PCP Fax: [phone]    Discharging Team(s): [facility] MED L    Encounter Date: 08/20/2024    Admit to Home Health    Diagnoses:  Active Hospital Problems    Diagnosis  POA    PAF (paroxysmal atrial fibrillation) [I48.0]  No    Thrombocytopenia [D69.6]  Yes    Type 2 diabetes mellitus without complication, without long-term current use of insulin [E11.9]  Yes    Hepatic encephalopathy [K76.82]  Yes    Class 1 obesity due to excess calories with serious comorbidity and body mass index (BMI) of 34.0 to 34.9 in adult [E66.09, Z68.34]  Not Applicable    Hepatocellular carcinoma [C22.0]  Yes     Chronic    PVT (portal vein thrombosis) [I81]  Yes     Chronic    Atrial thrombosis [I51.3]  Yes     Chronic    History of pulmonary embolism [Z86.711]  Yes     Chronic    Acquired hypothyroidism [E03.9]  Yes      Resolved Hospital Problems    Diagnosis Date Resolved POA    *Sepsis with encephalopathy without septic shock [A41.9, R65.20, G93.41] 08/20/2024 Yes    Fall [W19.XXXA] 08/20/2024 Yes    ELAINE (acute kidney injury) [N17.9] 08/20/2024 Yes       Future Appointments   Date Time Provider Department Center   9/26/2024  8:30 AM LAB, APPOINTMENT [facility]   9/26/2024  9:30 AM Adelaide Garcia MD [facility] HEPAT [facility]           I have seen and examined this patient face to face today. My clinical findings that support the need for the home health skilled services and home bound status are the following:  Weakness/numbness causing balance and gait disturbance due to Infection and Weakness/Debility making it taxing to leave home.    Allergies:Review of patient's allergies indicates:  No Known Allergies    Diet: fluid restriction: 1.5L and 2 gram  sodium diet    Activities: activity as tolerated    Nursing:   SN to complete comprehensive assessment including routine vital signs. Instruct on disease process and s/s of complications to report to MD. Review/verify medication list sent home with the patient at time of discharge  and instruct patient/caregiver as needed. Frequency may be adjusted depending on start of care date.    Notify MD if SBP > 160 or < 90; DBP > 90 or < 50; HR > 120 or < 50; Temp > 101; Other:         CONSULTS:    Physical Therapy to evaluate and treat. Evaluate for home safety and equipment needs; Establish/upgrade home exercise program. Perform / instruct on therapeutic exercises, gait training, transfer training, and Range of Motion.  Occupational Therapy to evaluate and treat. Evaluate home environment for safety and equipment needs. Perform/Instruct on transfers, ADL training, ROM, and therapeutic exercises.   to evaluate for community resources/long-range planning.  Aide to provide assistance with personal care, ADLs, and vital signs.    MISCELLANEOUS CARE:      WOUND CARE ORDERS        Medications: Review discharge medications with patient and family and provide education.      Current Discharge Medication List        START taking these medications    Details   amoxicillin-clavulanate 875-125mg (AUGMENTIN) 875-125 mg per tablet Take 1 tablet by mouth every 12 (twelve) hours. First dose night of 8/20/24 for 5 days  Qty: 9 tablet, Refills: 0      metoprolol tartrate (LOPRESSOR) 25 MG tablet Take 0.5 tablets (12.5 mg total) by mouth 2 (two) times daily.  Qty: 60 tablet, Refills: 0    Comments: .           CONTINUE these medications which have NOT CHANGED    Details   albuterol (PROVENTIL/VENTOLIN HFA) 90 mcg/actuation inhaler Inhale 2 puffs into the lungs every 6 (six) hours as needed for Wheezing. Rescue      apixaban (ELIQUIS) 5 mg Tab Take 1 tablet (5 mg total) by mouth 2 (two) times daily.  Qty: 60 tablet, Refills: 2       DULoxetine (CYMBALTA) 30 MG capsule Take 30 mg by mouth nightly.      furosemide (LASIX) 40 MG tablet Take 40 mg by mouth 2 (two) times daily.      gabapentin (NEURONTIN) 800 MG tablet Take 1,600 mg by mouth 2 (two) times daily.      HYDROcodone-acetaminophen (NORCO)  mg per tablet Take 1 tablet by mouth every 6 (six) hours as needed for Pain.      hydrocortisone 2.5 % cream Apply topically 2 (two) times daily.  Qty: 28 g, Refills: 0      lactulose (CHRONULAC) 10 gram/15 mL solution Take 30 mLs (20 g total) by mouth 3 (three) times daily. TITRATE TO 3-4 BOWEL MOVEMENTS DAILY.  Qty: 2700 mL, Refills: 2      levothyroxine (SYNTHROID) 200 MCG tablet Take 200 mcg by mouth before breakfast.      metFORMIN (GLUCOPHAGE) 1000 MG tablet Take 1,000 mg by mouth 2 (two) times daily with meals.      pramipexole di-HCl (MIRAPEX ORAL) Take 1 mg by mouth every evening.      spironolactone (ALDACTONE) 100 MG tablet Take 1 tablet (100 mg total) by mouth once daily.  Qty: 30 tablet, Refills: 1    Comments: .             I certify that this patient is confined to his home and needs intermittent skilled nursing care, physical therapy, and occupational therapy.

## 2024-08-20 NOTE — PLAN OF CARE
LFT's elevated. K+=4.3. Mg+2 =2.2. Cr=1.2. Afebrile. IV antibiotics discontinued. Augmentin started. Remains oriented. Encouraged ambulation with assistance wearing non-skid socks. Walker ordered and delivered to room for home use. Glucoses monitored. No Insulin indicated. Tolerating po well. Had BM X2 today. Afternoon dose Lactulose due to pt being discharged. Wife states uses O2 at home as needed. PO stable on RA here on continuous PO. Has portable O2 tank in car if needed. Skin intact. Plan to discharge home with family today.

## 2024-08-20 NOTE — PLAN OF CARE
08/20/24 1253   Post-Acute Status   Post-Acute Authorization Home Health;HME   Home Health Status Set-up Complete/Auth obtained       formerly [facility], [facility], [facility] (formerly known as [facility]) Phone: [phone] [address] [place] [address] 8/20/2024 12:47 (CT)  8/20/2024 14:45 (CT)  -  8/20/2024 12:48 (CT)  8/20/2024 12:48 (CT)  Response: Yes, willing to accept patient      The SW contacted the  of  and obtained permission to complete a cost transfer on behalf of this patient for a rolling walker. The SW is awaiting the cost transfer form.       1:41 PM  The SW received the cost transfer from for this patient's rolling walker. The SW completed the form and emailed it back to [facility] with [facility].      Chucky Tinoco, JERADW  Case Management [facility]

## 2024-08-20 NOTE — DISCHARGE SUMMARY
[facility]  Discharge Summary      Patient Name: Howard G Sistrunk  MRN: [id number]  EDWARD: [id number]  Patient Class: IP- Inpatient  Admission Date: 8/17/2024  Hospital Length of Stay: 2 days  Discharge Date and Time:  08/20/2024 11:03 AM  Attending Physician: Dhaval Corey DO   Discharging Provider: Dhaval Corey DO  Primary Care Provider: Willard España MD  [facility] Medicine Team: [facility]OS[facility] MED L Dhaval Corey DO  Primary Care Team: [facility] MED L    HPI:     74-year-old man with history of type 2 diabetes, hypertension, HCC, PE, portal vein thrombosis on anticoagulation presents to the emergency department after unwitnessed fall and concern for mental status change.      He was discharged from [facility] hospital medicine yesterday, during his recent admission but there were concerns for hepatic encephalopathy exacerbation, acute thrombocytopenia requiring Hematology evaluation and recent diagnosis of HCC.  He also was treated for ELAINE.  He was discharged yesterday and at bedside his son is present who reports that patient was doing well when he got home able to get out of the car on his own power ambulating without assistance, made himself a sandwich.  Patient's wife and son felt he was doing well.  Son lives nearby and was called by a step mom this morning after patient had an unwitnessed fall at home.  The patient could not get up under his own power and even with son's assistance he had difficulty.  Son noted that he seemed overall more confused and had trouble remembering events of the day.      In ED workup he is found with an acute leukocytosis of 21 yesterday white count was 9.8 his platelet count is up trending today is 107 from 63 no acute signs of bleeding.  Patient did take lactulose yesterday unclear when his last bowel movement was, today ammonia is 88.  Creatinine is also up trending today.  He is referred for admission    * No surgery  found *      Hospital Course:   73 y/o with recent diagnosis of HCC, portal vein thrombosis due to HCC, h/o PE, atrial thrombus on Apixiban to treat - switched recently from Lovenox to Apixiban on recent hospital stay due to thrombocytopenia and concern for HIT but HIT was negative with recent [facility] admit for hepatic encephalopathy and discharged after resolution on Lactulose and Rifaximin and returned < 24 hours later on 8/17 and readmitted with generalized weakness and fatigue.     Concern for infection as WBC 21,000 and doubled in less than 1 day  as 10,000 on discharge on 8/16. Also has new ELAINE with Creatinine 1.8 and was 1.3 on 8/16. U/A done and did show show 19 WBC and occasional bacteria so possible source of infection. CXR done and no evidence of pneumonia noted. Urine and blood cultures sent. IR attempted US paracentesis on 8/16 on day of recent hospital discharge but not enough ascitic fluid to tap so doubt SBP as cause of infection. Patient given IVF's as per sepsis protocol. Patient started on IV Vancomycin and Rocephin.  ELAINE improved, likely prerenal.  Diuretics were held, restart on discharge.  Hospitalization complicated by intermittent atrial fibrillation with RVR, started on metoprolol with better rate control. Patient and recent echo on 8/5/2024 that showed normal EF 65% and normal diastolic function and no valvular abnormalities.  Urine cultures eventually growing Klebsiella and Enterococcus, susceptible to Augmentin.  Discharge to complete 7 days antibiotics.  Symptoms attributable to UTI rather than hepatic encephalopathy.  No need to initiate rifaximin for now, consider if presenting again with overt hepatic encephalopathy.  Of note, was to follow up with Oncology on 08/19 to discuss potential treatment options for HCC, will contact CM before d/c to reschedule.      Magan URIBE's mobility limitation cannot be sufficiently resolved by the use of a cane. His functional mobility deficit can be  sufficiently resolved with the use of a Rolling Walker. Patient's mobility limitation significantly impairs their ability to participate in one of more activities of daily living.  The use of a RW will significantly improve the patient's ability to participate in MRADLS and the patient will use it on regular basis in the home.     Physical Exam  Gen: in NAD, appears stated age  Neuro: AAOx3, motor, sensory, and strength grossly intact BL  HEENT: NTNC, EOMI, PERRL, MMM  CVS: RRR, no m/r/g; S1/S2 auscultated with no S3 or S4; capillary refill < 2 sec  Resp: lungs CTAB, no w/r/r; no belabored breathing or accessory muscle use appreciated   Abd: BS+ in all 4 quadrants; NTND, soft to palpation; no organomegaly appreciated   Extrem: pulses full, equal, and regular over all 4 extremities; no UE or LE edema BL    Goals of Care Treatment Preferences:  Code Status: Full Code      SDOH Screening:  The patient declined to be screened for utility difficulties, food insecurity, transport difficulties, housing insecurity, and interpersonal safety, so no concerns could be identified this admission.     Consults:     No new Assessment & Plan notes have been filed under this hospital service since the last note was generated.  Service: Hospital Medicine    Final Active Diagnoses:    Diagnosis Date Noted POA    PAF (paroxysmal atrial fibrillation) [I48.0] 08/19/2024 No    Thrombocytopenia [D69.6] 08/14/2024 Yes    Type 2 diabetes mellitus without complication, without long-term current use of insulin [E11.9]  Yes    Hepatic encephalopathy [K76.82] 08/13/2024 Yes    Class 1 obesity due to excess calories with serious comorbidity and body mass index (BMI) of 34.0 to 34.9 in adult [E66.09, Z68.34] 08/05/2024 Not Applicable    Hepatocellular carcinoma [C22.0] 08/03/2024 Yes     Chronic    PVT (portal vein thrombosis) [I81] 08/03/2024 Yes     Chronic    Atrial thrombosis [I51.3] 08/03/2024 Yes     Chronic    History of pulmonary embolism  "[Z86.711] 01/26/2024 Yes     Chronic    Acquired hypothyroidism [E03.9] 01/03/2022 Yes      Problems Resolved During this Admission:    Diagnosis Date Noted Date Resolved POA    PRINCIPAL PROBLEM:  Sepsis with encephalopathy without septic shock [A41.9, R65.20, G93.41] 08/17/2024 08/20/2024 Yes    Fall [W19.XXXA] 08/17/2024 08/20/2024 Yes    ELAINE (acute kidney injury) [N17.9] 08/14/2024 08/20/2024 Yes       Discharged Condition: fair    Disposition:     Follow Up:    Patient Instructions:      WALKER FOR HOME USE     Order Specific Question Answer Comments   Type of Walker: Adult (5'4"-6'6")    With wheels? Yes    Height: 6' (1.829 m)    Weight: 119.3 kg (263 lb 0.1 oz)    Length of need (1-99 months): 99    Does patient have medical equipment at home? none    Please check all that apply: Patient's condition impairs ambulation.    Please check all that apply: Patient needs help to get in and out of chair.    Please check all that apply: Walker will be used for gait training.    Please check all that apply: Altered sensory perception.    Please check all that apply: Patient is unable to safely ambulate without equipment.      Notify your health care provider if you experience any of the following:     Notify your health care provider if you experience any of the following:  increased confusion or weakness     Notify your health care provider if you experience any of the following:  persistent dizziness, light-headedness, or visual disturbances     Notify your health care provider if you experience any of the following:  worsening rash     Notify your health care provider if you experience any of the following:  severe persistent headache     Notify your health care provider if you experience any of the following:  difficulty breathing or increased cough     Notify your health care provider if you experience any of the following:  redness, tenderness, or signs of infection (pain, swelling, redness, odor or green/yellow " discharge around incision site)     Notify your health care provider if you experience any of the following:  severe uncontrolled pain     Notify your health care provider if you experience any of the following:  persistent nausea and vomiting or diarrhea     Notify your health care provider if you experience any of the following:  temperature >100.4       Significant Diagnostic Studies: N/A    Pending Diagnostic Studies:       None           Medications:  Reconciled Home Medications:      Medication List        START taking these medications      amoxicillin-clavulanate 875-125mg 875-125 mg per tablet  Commonly known as: AUGMENTIN  Take 1 tablet by mouth every 12 (twelve) hours. First dose night of 8/20/24 for 5 days     metoprolol tartrate 25 MG tablet  Commonly known as: LOPRESSOR  Take 0.5 tablets (12.5 mg total) by mouth 2 (two) times daily.            CONTINUE taking these medications      albuterol 90 mcg/actuation inhaler  Commonly known as: PROVENTIL/VENTOLIN HFA  Inhale 2 puffs into the lungs every 6 (six) hours as needed for Wheezing. Rescue     CONSTULOSE 10 gram/15 mL solution  Generic drug: lactulose  Take 30 mLs (20 g total) by mouth 3 (three) times daily. TITRATE TO 3-4 BOWEL MOVEMENTS DAILY.     DULoxetine 30 MG capsule  Commonly known as: CYMBALTA  Take 30 mg by mouth nightly.     ELIQUIS 5 mg Tab  Generic drug: apixaban  Take 1 tablet (5 mg total) by mouth 2 (two) times daily.     furosemide 40 MG tablet  Commonly known as: LASIX  Take 40 mg by mouth 2 (two) times daily.     gabapentin 800 MG tablet  Commonly known as: NEURONTIN  Take 1,600 mg by mouth 2 (two) times daily.     HYDROcodone-acetaminophen  mg per tablet  Commonly known as: NORCO  Take 1 tablet by mouth every 6 (six) hours as needed for Pain.     hydrocortisone 2.5 % cream  Apply topically 2 (two) times daily.     levothyroxine 200 MCG tablet  Commonly known as: SYNTHROID  Take 200 mcg by mouth before breakfast.     metFORMIN  1000 MG tablet  Commonly known as: GLUCOPHAGE  Take 1,000 mg by mouth 2 (two) times daily with meals.     MIRAPEX ORAL  Take 1 mg by mouth every evening.     spironolactone 100 MG tablet  Commonly known as: ALDACTONE  Take 1 tablet (100 mg total) by mouth once daily.              Indwelling Lines/Drains at time of discharge:   Lines/Drains/Airways       None                   Time spent on the discharge of patient: 35 minutes     Pt deemed appropriate for discharge. Plan discussed with pt, who was agreeable and amenable; medications were discussed and reviewed, outpatient follow-up scheduled, ER precautions were given, all questions were answered to the pt's satisfaction, and Mr Sistrunk was subsequently discharged.      Dhaval Corey DO  Department of Hospital Medicine  [facility] - [facility] Step[facility]

## 2024-08-20 NOTE — NURSING
Discharge instructions given and reviewed with pt and wife. Needed RX for home delivered to room along with rolling walker. Follow-up appt scheduled for Heme-onc MD. HH arranged by FERNANDA. Verbalized understanding.

## 2024-08-20 NOTE — PROGRESS NOTES
Therapy with vancomycin complete and/or consult discontinued by provider. Pharmacy will sign off, please re-consult as needed.    Winnie Grove, PharmD, BCPS  [id number]

## 2024-08-20 NOTE — PLAN OF CARE
CHW was unable to schedule follow up for Oncolgy,. Oncology team will contact pt for a 1 week follow up.

## 2024-08-20 NOTE — PLAN OF CARE
10:41 AM    The SW met with the patient and his wife at bedside to follow-up regarding d/c planning. The patient and his wife reported that the patient is current with [facility]. The SW has requested home health orders from this patient's treating MD.  The patient's wife inquired about obtaining a rolling walker. The SW sent Shimon with [facility] a secure chat regarding obtaining a rolling walker on behalf of this patient.     Chucky Tinoco, BISMARK  Case Management [facility]

## 2024-08-21 ENCOUNTER — TELEPHONE (OUTPATIENT)
Dept: PHARMACY | Facility: CLINIC | Age: 74
End: 2024-08-21
Payer: MEDICARE

## 2024-08-21 ENCOUNTER — PATIENT MESSAGE (OUTPATIENT)
Dept: ADMINISTRATIVE | Facility: HOSPITAL | Age: 74
End: 2024-08-21
Payer: MEDICARE

## 2024-08-21 NOTE — TELEPHONE ENCOUNTER
From: Winnie Grove, PharmD   Sent: 8/19/2024   3:30 PM CDT   To: Pharmacy Patient Assistance Team     Rifaximin copay is > $700. Please assist Mr Sistrunk with any assistance available. Thanks!     Winnie Grove, PharmD, BCPS   [id number]

## 2024-08-21 NOTE — TELEPHONE ENCOUNTER
Patient terminated on Xifaxan upon discharge from hospital. Assistance no longer needed and confirmed by patient's spouse

## 2024-08-22 ENCOUNTER — TELEPHONE (OUTPATIENT)
Dept: HEMATOLOGY/ONCOLOGY | Facility: CLINIC | Age: 74
End: 2024-08-22
Payer: MEDICARE

## 2024-08-23 LAB
BACTERIA BLD CULT: NORMAL
BACTERIA BLD CULT: NORMAL

## 2024-08-29 ENCOUNTER — OFFICE VISIT (OUTPATIENT)
Dept: HEMATOLOGY/ONCOLOGY | Facility: CLINIC | Age: 74
End: 2024-08-29
Payer: MEDICARE

## 2024-08-29 ENCOUNTER — DOCUMENTATION ONLY (OUTPATIENT)
Dept: HEMATOLOGY/ONCOLOGY | Facility: CLINIC | Age: 74
End: 2024-08-29
Payer: MEDICARE

## 2024-08-29 VITALS
TEMPERATURE: 97 F | BODY MASS INDEX: 34.98 KG/M2 | HEART RATE: 94 BPM | HEIGHT: 72 IN | OXYGEN SATURATION: 96 % | WEIGHT: 258.25 LBS

## 2024-08-29 DIAGNOSIS — R18.0 MALIGNANT ASCITES: ICD-10-CM

## 2024-08-29 DIAGNOSIS — C22.0 HCC (HEPATOCELLULAR CARCINOMA): Primary | ICD-10-CM

## 2024-08-29 DIAGNOSIS — Z86.711 HISTORY OF PULMONARY EMBOLISM: Chronic | ICD-10-CM

## 2024-08-29 DIAGNOSIS — K76.82 HEPATIC ENCEPHALOPATHY: ICD-10-CM

## 2024-08-29 DIAGNOSIS — R79.89 ELEVATED LFTS: ICD-10-CM

## 2024-08-29 PROCEDURE — 99215 OFFICE O/P EST HI 40 MIN: CPT | Mod: S$GLB,,, | Performed by: INTERNAL MEDICINE

## 2024-08-29 PROCEDURE — 99999 PR PBB SHADOW E&M-EST. PATIENT-LVL V: CPT | Mod: PBBFAC,,, | Performed by: INTERNAL MEDICINE

## 2024-08-29 NOTE — PROGRESS NOTES
Received message from Dr. Mcwilliams requesting assistance with hospice order placed.    1:06 pm  SW called patient/family to offer information regarding hospice services and support. Questions encouraged and answered. Spouse Nishi states they do not have a preference in agency and asks that SW send referral to any agency.     SW called Debby/Nicolle to inform of referral being sent over. She asks that SW send referral to [place] office. SW faxed progress note and order to [facility] ([phone]) at 1:32 pm. Will update patient/family/team once agency has accepted referral and if any additional needs are identified.    2:56 pm  SW called [facility] [place]/Shyann at [phone] who states referral has been forwarded to [place] office.    2:58 pm  SW called [facility] [place]/Hailey at [phone]. Selam states DME and admit have been scheduled and agency has no further needs. SW called Nishi/spouse to assure she heard from agency and she states she was on the other line with DME. She thanks  for hospice assistance.     Update given to Dr. Mcwilliams.

## 2024-08-29 NOTE — PROGRESS NOTES
MEDICAL ONCOLOGY - NEW PATIENT VISIT    Reason for visit: HCC    Best Contact Phone Number(s): [phone] (home)      Cancer/Stage/TNM:    Cancer Staging   No matching staging information was found for the patient.       Oncology History    No history exists.        HPI:   74 y.o. male with HTN, T2DM, PE who presents for further discussion of recently diagnosed hepatocellular carcinoma. Much of history is provided by the patient's family.  He has been feeling fairly well and had been active through this past summer.  Over the last month he has declined, starting with abdominal pain that led to a CT revealing a large R liver lobe mass.  He has since been in and out of the hospital several times including at [facility] with hepatic encephalopathy and infection.  He has been taking Eliquis for PE that was in January 2024.  He has been spending most of this days out of the hospital resting and sleeping.  He is taking lactulose with a goal of TID bowel movements but has some days of alertness and some days of fatigue/grogginess.    He has not been a heavy drinker in his life.  No known exposure to HCV.    Patient presents with his wife, son, daughter and daughter-in-law.  ECOG PS is 3.  History has been obtained by chart review and discussion with the patient's family.    ROS:   Review of Systems   Unable to perform ROS: Mental status change       Past Medical History:   Past Medical History:   Diagnosis Date    Atrial thrombosis 08/03/2024    Diabetes mellitus, type 2     Essential hypertension     HCC (hepatocellular carcinoma)     Hepatic encephalopathy 08/13/2024    Obesity     Portal vein thrombosis 08/03/2024    Pulmonary embolism 01/26/2024    Sebaceous cyst 11/17/2021        Past Surgical History:   Past Surgical History:   Procedure Laterality Date    ESOPHAGOGASTRODUODENOSCOPY N/A 8/6/2024    Procedure: EGD (ESOPHAGOGASTRODUODENOSCOPY);  Surgeon: Alexander Bernstein MD;  Location: [facility] ([address] [place] [address]);  Service:  Endoscopy;  Laterality: N/A;        Family History:   No family history on file.     Social History:   Social History     Tobacco Use    Smoking status: Never    Smokeless tobacco: Never   Substance Use Topics    Alcohol use: Not on file        I have reviewed and updated the patient's past medical, surgical, family and social histories.    Allergies:   Review of patient's allergies indicates:  No Known Allergies     Medications:   Current Outpatient Medications   Medication Sig Dispense Refill    albuterol (PROVENTIL/VENTOLIN HFA) 90 mcg/actuation inhaler Inhale 2 puffs into the lungs every 6 (six) hours as needed for Wheezing. Rescue      apixaban (ELIQUIS) 5 mg Tab Take 1 tablet (5 mg total) by mouth 2 (two) times daily. 60 tablet 2    DULoxetine (CYMBALTA) 30 MG capsule Take 30 mg by mouth nightly.      furosemide (LASIX) 40 MG tablet Take 40 mg by mouth 2 (two) times daily.      gabapentin (NEURONTIN) 800 MG tablet Take 1,600 mg by mouth 2 (two) times daily.      HYDROcodone-acetaminophen (NORCO)  mg per tablet Take 1 tablet by mouth every 6 (six) hours as needed for Pain.      hydrocortisone 2.5 % cream Apply topically 2 (two) times daily. 28 g 0    lactulose (CHRONULAC) 10 gram/15 mL solution Take 30 mLs (20 g total) by mouth 3 (three) times daily. TITRATE TO 3-4 BOWEL MOVEMENTS DAILY. 2700 mL 2    levothyroxine (SYNTHROID) 200 MCG tablet Take 200 mcg by mouth before breakfast.      metFORMIN (GLUCOPHAGE) 1000 MG tablet Take 1,000 mg by mouth 2 (two) times daily with meals.      metoprolol tartrate (LOPRESSOR) 25 MG tablet Take ½ tablet (12.5 mg total) by mouth 2 (two) times daily. 60 tablet 0    pramipexole di-HCl (MIRAPEX ORAL) Take 1 mg by mouth every evening.      spironolactone (ALDACTONE) 100 MG tablet Take 1 tablet (100 mg total) by mouth once daily. 30 tablet 1     No current facility-administered medications for this visit.        Physical Exam:   Pulse 94   Temp 97.3 °F (36.3 °C) (Temporal)    Ht 6' (1.829 m)   Wt 117.1 kg (258 lb 4.3 oz)   SpO2 96%   BMI 35.03 kg/m²                Physical Exam  Constitutional:       General: He is not in acute distress.     Appearance: He is obese. He is ill-appearing (chronically).   Eyes:      General: Scleral icterus (mild) present.   Cardiovascular:      Rate and Rhythm: Normal rate.   Pulmonary:      Effort: Pulmonary effort is normal. No respiratory distress.   Abdominal:      General: There is distension.      Tenderness: There is abdominal tenderness.   Musculoskeletal:      Right lower leg: Edema present.      Left lower leg: Edema present.   Neurological:      Mental Status: He is disoriented.           Labs:   No results found for this or any previous visit (from the past 48 hour(s)).     8/20/24 labs show mild anemia, alk phos 440, total bili 1.3, albumin 2.3.  , .    Imaging:      CT CAP 8/6/24 reviewed and shows large R hepatic lobe HCC with portal vein, IVC invasion extending into right atrium.  Satellite lesions present in liver as well.       Assessment:       1. HCC (hepatocellular carcinoma)    2. Malignant ascites    3. Hepatic encephalopathy    4. Elevated LFTs    5. History of pulmonary embolism          Plan:             # HCC  I discussed with the patient and his family his imaging findings that in combination with his elevated AFP are diagnostic of hepatocellular carcinoma.  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  Unfortunately his performance status and his liver function have both declined considerably over the last month.  I do not think that he has the liver reserve or the PS for systemic therapy, and his prognosis is unfortunately very poor.  This was explained in depth to the patient and his family.  The patient's ability to engage in this conversation was limited by his hepatic encephalopathy, but the family asked many appropriate questions.    After  discussion, we will proceed with hospice enrollment and set this up as soon as possible to focus on his comfort.  Patient's family agreeable with this plan.    # PE  He has been tolerating Eliquis well.  Can continue.       The above information has been reviewed with the patient and all questions have been answered to their apparent satisfaction.  They understand that they can call the clinic with any questions.    Anibal Mcwilliams MD  Hematology/Oncology  [facility]    Route Chart for Scheduling    Med Onc Chart Routing      Follow up with physician No follow up needed.   Follow up with [facility]    Infusion scheduling note    Injection scheduling note    Labs    Imaging    Pharmacy appointment    Other referrals

## 2024-08-30 ENCOUNTER — TELEPHONE (OUTPATIENT)
Dept: INTERVENTIONAL RADIOLOGY/VASCULAR | Facility: CLINIC | Age: 74
End: 2024-08-30
Payer: MEDICARE

## 2024-08-30 NOTE — TELEPHONE ENCOUNTER
Call and spoke with patient spouse to schedule IR clinic consult to discuss Y90 to treat liver cancer.  Patient spouse stated pt was put on hospice yeterday 8/29/2024.  Fwd msg to provider.